# Patient Record
Sex: MALE | Race: WHITE | Employment: OTHER | URBAN - METROPOLITAN AREA
[De-identification: names, ages, dates, MRNs, and addresses within clinical notes are randomized per-mention and may not be internally consistent; named-entity substitution may affect disease eponyms.]

---

## 2017-01-23 ENCOUNTER — OFFICE VISIT (OUTPATIENT)
Dept: FAMILY MEDICINE CLINIC | Facility: CLINIC | Age: 72
End: 2017-01-23

## 2017-01-23 ENCOUNTER — LAB ENCOUNTER (OUTPATIENT)
Dept: LAB | Age: 72
End: 2017-01-23
Attending: INTERNAL MEDICINE
Payer: MEDICARE

## 2017-01-23 VITALS
DIASTOLIC BLOOD PRESSURE: 72 MMHG | SYSTOLIC BLOOD PRESSURE: 122 MMHG | WEIGHT: 196 LBS | RESPIRATION RATE: 16 BRPM | BODY MASS INDEX: 29.03 KG/M2 | HEIGHT: 69 IN | HEART RATE: 70 BPM

## 2017-01-23 DIAGNOSIS — E78.00 PURE HYPERCHOLESTEROLEMIA: ICD-10-CM

## 2017-01-23 DIAGNOSIS — E78.00 PURE HYPERCHOLESTEROLEMIA: Primary | ICD-10-CM

## 2017-01-23 DIAGNOSIS — D64.9 ANEMIA, UNSPECIFIED TYPE: ICD-10-CM

## 2017-01-23 LAB
ALBUMIN SERPL-MCNC: 4 G/DL (ref 3.5–4.8)
ALP LIVER SERPL-CCNC: 49 U/L (ref 45–117)
ALT SERPL-CCNC: 20 U/L (ref 17–63)
AST SERPL-CCNC: 23 U/L (ref 15–41)
BASOPHILS # BLD AUTO: 0.05 X10(3) UL (ref 0–0.1)
BASOPHILS NFR BLD AUTO: 0.8 %
BILIRUB SERPL-MCNC: 1.5 MG/DL (ref 0.1–2)
BUN BLD-MCNC: 11 MG/DL (ref 8–20)
CALCIUM BLD-MCNC: 8.8 MG/DL (ref 8.3–10.3)
CHLORIDE: 105 MMOL/L (ref 101–111)
CHOLEST SMN-MCNC: 136 MG/DL (ref ?–200)
CO2: 28 MMOL/L (ref 22–32)
CREAT BLD-MCNC: 1.16 MG/DL (ref 0.7–1.3)
EOSINOPHIL # BLD AUTO: 0.15 X10(3) UL (ref 0–0.3)
EOSINOPHIL NFR BLD AUTO: 2.4 %
ERYTHROCYTE [DISTWIDTH] IN BLOOD BY AUTOMATED COUNT: 15.1 % (ref 11.5–16)
GLUCOSE BLD-MCNC: 95 MG/DL (ref 70–99)
HCT VFR BLD AUTO: 45.5 % (ref 37–53)
HDLC SERPL-MCNC: 54 MG/DL (ref 45–?)
HDLC SERPL: 2.52 {RATIO} (ref ?–4.97)
HGB BLD-MCNC: 14.8 G/DL (ref 13–17)
IMMATURE GRANULOCYTE COUNT: 0.01 X10(3) UL (ref 0–1)
IMMATURE GRANULOCYTE RATIO %: 0.2 %
LDLC SERPL CALC-MCNC: 65 MG/DL (ref ?–130)
LYMPHOCYTES # BLD AUTO: 1.37 X10(3) UL (ref 0.9–4)
LYMPHOCYTES NFR BLD AUTO: 21.7 %
M PROTEIN MFR SERPL ELPH: 7.1 G/DL (ref 6.1–8.3)
MCH RBC QN AUTO: 31.3 PG (ref 27–33.2)
MCHC RBC AUTO-ENTMCNC: 32.5 G/DL (ref 31–37)
MCV RBC AUTO: 96.2 FL (ref 80–99)
MONOCYTES # BLD AUTO: 0.95 X10(3) UL (ref 0.1–0.6)
MONOCYTES NFR BLD AUTO: 15 %
NEUTROPHIL ABS PRELIM: 3.79 X10 (3) UL (ref 1.3–6.7)
NEUTROPHILS # BLD AUTO: 3.79 X10(3) UL (ref 1.3–6.7)
NEUTROPHILS NFR BLD AUTO: 59.9 %
NONHDLC SERPL-MCNC: 82 MG/DL (ref ?–130)
PLATELET # BLD AUTO: 197 10(3)UL (ref 150–450)
POTASSIUM SERPL-SCNC: 4.3 MMOL/L (ref 3.6–5.1)
PSA SERPL-MCNC: 1.32 NG/ML (ref 0.01–4)
RBC # BLD AUTO: 4.73 X10(6)UL (ref 3.8–5.8)
RED CELL DISTRIBUTION WIDTH-SD: 53.6 FL (ref 35.1–46.3)
SODIUM SERPL-SCNC: 139 MMOL/L (ref 136–144)
TRIGLYCERIDES: 87 MG/DL (ref ?–150)
TSI SER-ACNC: 0.88 MIU/ML (ref 0.35–5.5)
VLDL: 17 MG/DL (ref 5–40)
WBC # BLD AUTO: 6.3 X10(3) UL (ref 4–13)

## 2017-01-23 PROCEDURE — 99213 OFFICE O/P EST LOW 20 MIN: CPT | Performed by: INTERNAL MEDICINE

## 2017-01-23 PROCEDURE — 85025 COMPLETE CBC W/AUTO DIFF WBC: CPT

## 2017-01-23 PROCEDURE — 84443 ASSAY THYROID STIM HORMONE: CPT

## 2017-01-23 PROCEDURE — 80061 LIPID PANEL: CPT

## 2017-01-23 PROCEDURE — 80053 COMPREHEN METABOLIC PANEL: CPT

## 2017-01-23 PROCEDURE — 36415 COLL VENOUS BLD VENIPUNCTURE: CPT

## 2017-01-23 PROCEDURE — 84153 ASSAY OF PSA TOTAL: CPT

## 2017-01-23 NOTE — PROGRESS NOTES
CC: Patient presents with:  Medication Follow-Up       HPI:   High lipids, doing well on lipitor, no chest pain. Didn't see heme/onc.        Current Outpatient Prescriptions:  ATORVASTATIN CALCIUM 10 MG Oral Tab TAKE ONE TABLET BY MOUTH ONCE DAILY Dis failure) ([facility] [address]     Atrial fibrillation ([facility] [address]     Diverticulitis of colon (without mention of hemorrhage)     Other and unspecified hyperlipidemia     Dizziness and giddiness     Flank pain     Other follow-up examination(V67.59)     Acute conjunctivitis, b reason, didn't see heme/onc, will repeat cbc. The patient indicates understanding of these issues and agrees to the plan. Return in about 6 months (around 7/23/2017).

## 2017-06-23 ENCOUNTER — TELEPHONE (OUTPATIENT)
Dept: FAMILY MEDICINE CLINIC | Facility: CLINIC | Age: 72
End: 2017-06-23

## 2017-07-05 RX ORDER — ATORVASTATIN CALCIUM 10 MG/1
TABLET, FILM COATED ORAL
Qty: 90 TABLET | Refills: 1 | Status: SHIPPED | OUTPATIENT
Start: 2017-07-05 | End: 2017-12-28

## 2017-07-05 NOTE — TELEPHONE ENCOUNTER
Requesting Atorvastatin 10mg  LOV: 1/23/17  RTC: 6 months  Last Labs: 1/23/17  Filled: 12/27/16 #90 with 1 refills    Future Appointments  Date Time Provider Arturo Matthews   7/24/2017 1:00 PM Yaw Belcher MD EMG [address]   5/11/2018 8:40 AM Marisol Rg

## 2017-07-24 ENCOUNTER — OFFICE VISIT (OUTPATIENT)
Dept: FAMILY MEDICINE CLINIC | Facility: CLINIC | Age: 72
End: 2017-07-24

## 2017-07-24 VITALS
DIASTOLIC BLOOD PRESSURE: 76 MMHG | HEIGHT: 69 IN | SYSTOLIC BLOOD PRESSURE: 122 MMHG | BODY MASS INDEX: 27.99 KG/M2 | WEIGHT: 189 LBS | RESPIRATION RATE: 16 BRPM | HEART RATE: 78 BPM

## 2017-07-24 DIAGNOSIS — E78.00 PURE HYPERCHOLESTEROLEMIA: Primary | ICD-10-CM

## 2017-07-24 DIAGNOSIS — I48.20 CHRONIC ATRIAL FIBRILLATION (HCC): ICD-10-CM

## 2017-07-24 DIAGNOSIS — I50.22 CHRONIC SYSTOLIC CONGESTIVE HEART FAILURE (HCC): ICD-10-CM

## 2017-07-24 PROCEDURE — 99214 OFFICE O/P EST MOD 30 MIN: CPT | Performed by: INTERNAL MEDICINE

## 2017-07-24 NOTE — PROGRESS NOTES
CC: Patient presents with:  Medication Follow-Up       HPI:   1. Pure hypercholesterolemia  (primary encounter diagnosis), doing well on lipitor  2. Chronic systolic congestive heart failure ([address] doing well on lasix, no sob. F/u with cardiology.    3. Prediabetes 10/9/2014   • SOB (shortness of breath)    • Unspecified essential hypertension    • Visual impairment       Patient Active Problem List:     Pure hypercholesterolemia     CHF (congestive heart failure) (HCC)     Atrial fibrillation ([facility]     D heart failure (HCC)  Chronic atrial fibrillation ([facility]     PLAN:  1. Pure hypercholesterolemia  (primary encounter diagnosis), doing well on lipitor, will cont, will run labs.    2. Chronic systolic congestive heart failure ([facility] doing well on lasix, no so

## 2017-08-03 ENCOUNTER — LAB ENCOUNTER (OUTPATIENT)
Dept: LAB | Age: 72
End: 2017-08-03
Attending: INTERNAL MEDICINE
Payer: MEDICARE

## 2017-08-03 DIAGNOSIS — E78.00 PURE HYPERCHOLESTEROLEMIA: ICD-10-CM

## 2017-08-03 LAB
ALBUMIN SERPL-MCNC: 3.8 G/DL (ref 3.5–4.8)
ALP LIVER SERPL-CCNC: 54 U/L (ref 45–117)
ALT SERPL-CCNC: 23 U/L (ref 17–63)
AST SERPL-CCNC: 20 U/L (ref 15–41)
BILIRUB SERPL-MCNC: 1 MG/DL (ref 0.1–2)
BUN BLD-MCNC: 18 MG/DL (ref 8–20)
CALCIUM BLD-MCNC: 8.9 MG/DL (ref 8.3–10.3)
CHLORIDE: 107 MMOL/L (ref 101–111)
CHOLEST SMN-MCNC: 144 MG/DL (ref ?–200)
CO2: 25 MMOL/L (ref 22–32)
CREAT BLD-MCNC: 1.01 MG/DL (ref 0.7–1.3)
GLUCOSE BLD-MCNC: 106 MG/DL (ref 70–99)
HDLC SERPL-MCNC: 44 MG/DL (ref 45–?)
HDLC SERPL: 3.27 {RATIO} (ref ?–4.97)
LDLC SERPL CALC-MCNC: 84 MG/DL (ref ?–130)
LDLC SERPL-MCNC: 16 MG/DL (ref 5–40)
M PROTEIN MFR SERPL ELPH: 7 G/DL (ref 6.1–8.3)
NONHDLC SERPL-MCNC: 100 MG/DL (ref ?–130)
POTASSIUM SERPL-SCNC: 4.3 MMOL/L (ref 3.6–5.1)
SODIUM SERPL-SCNC: 139 MMOL/L (ref 136–144)
TRIGLYCERIDES: 80 MG/DL (ref ?–150)

## 2017-08-03 PROCEDURE — 36415 COLL VENOUS BLD VENIPUNCTURE: CPT

## 2017-08-03 PROCEDURE — 80053 COMPREHEN METABOLIC PANEL: CPT

## 2017-08-03 PROCEDURE — 80061 LIPID PANEL: CPT

## 2017-12-29 RX ORDER — ATORVASTATIN CALCIUM 10 MG/1
TABLET, FILM COATED ORAL
Qty: 90 TABLET | Refills: 0 | Status: SHIPPED | OUTPATIENT
Start: 2017-12-29 | End: 2018-01-30

## 2017-12-29 NOTE — TELEPHONE ENCOUNTER
Requesting Atorvastatin  LOV: 7/24/17  RTC: 6 months  Last Relevant Labs: 8/3/17  Filled: 7/5/17 #90 with 1 refills    Future Appointments  Date Time Provider Arturo Matthews   1/24/2018 8:00 AM Ruby Lancaster MD EMG [address]   5/11/2018 8:4

## 2018-01-24 ENCOUNTER — APPOINTMENT (OUTPATIENT)
Dept: LAB | Age: 73
End: 2018-01-24
Attending: FAMILY MEDICINE
Payer: MEDICARE

## 2018-01-30 ENCOUNTER — LAB ENCOUNTER (OUTPATIENT)
Dept: LAB | Age: 73
End: 2018-01-30
Attending: FAMILY MEDICINE
Payer: MEDICARE

## 2018-01-30 ENCOUNTER — OFFICE VISIT (OUTPATIENT)
Dept: FAMILY MEDICINE CLINIC | Facility: CLINIC | Age: 73
End: 2018-01-30

## 2018-01-30 VITALS
BODY MASS INDEX: 27.55 KG/M2 | RESPIRATION RATE: 16 BRPM | SYSTOLIC BLOOD PRESSURE: 116 MMHG | DIASTOLIC BLOOD PRESSURE: 66 MMHG | HEART RATE: 62 BPM | WEIGHT: 186 LBS | HEIGHT: 69 IN | TEMPERATURE: 99 F

## 2018-01-30 DIAGNOSIS — E78.00 PURE HYPERCHOLESTEROLEMIA: ICD-10-CM

## 2018-01-30 DIAGNOSIS — I50.22 CHRONIC SYSTOLIC CONGESTIVE HEART FAILURE (HCC): ICD-10-CM

## 2018-01-30 DIAGNOSIS — I48.20 CHRONIC ATRIAL FIBRILLATION (HCC): Primary | ICD-10-CM

## 2018-01-30 LAB
ALBUMIN SERPL-MCNC: 3.9 G/DL (ref 3.5–4.8)
ALP LIVER SERPL-CCNC: 46 U/L (ref 45–117)
ALT SERPL-CCNC: 22 U/L (ref 17–63)
AST SERPL-CCNC: 21 U/L (ref 15–41)
BASOPHILS # BLD AUTO: 0.03 X10(3) UL (ref 0–0.1)
BASOPHILS NFR BLD AUTO: 0.5 %
BILIRUB SERPL-MCNC: 1.1 MG/DL (ref 0.1–2)
BUN BLD-MCNC: 16 MG/DL (ref 8–20)
CALCIUM BLD-MCNC: 8.9 MG/DL (ref 8.3–10.3)
CHLORIDE: 106 MMOL/L (ref 101–111)
CHOLEST SMN-MCNC: 139 MG/DL (ref ?–200)
CO2: 30 MMOL/L (ref 22–32)
CREAT BLD-MCNC: 1.12 MG/DL (ref 0.7–1.3)
EOSINOPHIL # BLD AUTO: 0.15 X10(3) UL (ref 0–0.3)
EOSINOPHIL NFR BLD AUTO: 2.6 %
ERYTHROCYTE [DISTWIDTH] IN BLOOD BY AUTOMATED COUNT: 13.5 % (ref 11.5–16)
GLUCOSE BLD-MCNC: 101 MG/DL (ref 70–99)
HCT VFR BLD AUTO: 46.5 % (ref 37–53)
HDLC SERPL-MCNC: 43 MG/DL (ref 45–?)
HDLC SERPL: 3.23 {RATIO} (ref ?–4.97)
HGB BLD-MCNC: 15.4 G/DL (ref 13–17)
IMMATURE GRANULOCYTE COUNT: 0.02 X10(3) UL (ref 0–1)
IMMATURE GRANULOCYTE RATIO %: 0.3 %
LDLC SERPL CALC-MCNC: 83 MG/DL (ref ?–130)
LYMPHOCYTES # BLD AUTO: 1.44 X10(3) UL (ref 0.9–4)
LYMPHOCYTES NFR BLD AUTO: 25 %
M PROTEIN MFR SERPL ELPH: 7.3 G/DL (ref 6.1–8.3)
MCH RBC QN AUTO: 30.9 PG (ref 27–33.2)
MCHC RBC AUTO-ENTMCNC: 33.1 G/DL (ref 31–37)
MCV RBC AUTO: 93.2 FL (ref 80–99)
MONOCYTES # BLD AUTO: 0.72 X10(3) UL (ref 0.1–0.6)
MONOCYTES NFR BLD AUTO: 12.5 %
NEUTROPHIL ABS PRELIM: 3.4 X10 (3) UL (ref 1.3–6.7)
NEUTROPHILS # BLD AUTO: 3.4 X10(3) UL (ref 1.3–6.7)
NEUTROPHILS NFR BLD AUTO: 59.1 %
NONHDLC SERPL-MCNC: 96 MG/DL (ref ?–130)
PLATELET # BLD AUTO: 164 10(3)UL (ref 150–450)
POTASSIUM SERPL-SCNC: 4.4 MMOL/L (ref 3.6–5.1)
RBC # BLD AUTO: 4.99 X10(6)UL (ref 3.8–5.8)
RED CELL DISTRIBUTION WIDTH-SD: 45.6 FL (ref 35.1–46.3)
SODIUM SERPL-SCNC: 140 MMOL/L (ref 136–144)
TRIGL SERPL-MCNC: 63 MG/DL (ref ?–150)
VLDLC SERPL CALC-MCNC: 13 MG/DL (ref 5–40)
WBC # BLD AUTO: 5.8 X10(3) UL (ref 4–13)

## 2018-01-30 PROCEDURE — 80053 COMPREHEN METABOLIC PANEL: CPT

## 2018-01-30 PROCEDURE — 85025 COMPLETE CBC W/AUTO DIFF WBC: CPT

## 2018-01-30 PROCEDURE — 80061 LIPID PANEL: CPT

## 2018-01-30 PROCEDURE — 36415 COLL VENOUS BLD VENIPUNCTURE: CPT

## 2018-01-30 PROCEDURE — 99203 OFFICE O/P NEW LOW 30 MIN: CPT | Performed by: FAMILY MEDICINE

## 2018-01-30 RX ORDER — ATORVASTATIN CALCIUM 10 MG/1
TABLET, FILM COATED ORAL
Qty: 90 TABLET | Refills: 1 | Status: SHIPPED | OUTPATIENT
Start: 2018-01-30 | End: 2018-05-11

## 2018-01-30 RX ORDER — METOPROLOL TARTRATE 50 MG/1
50 TABLET, FILM COATED ORAL 2 TIMES DAILY
Qty: 180 TABLET | Refills: 1 | Status: CANCELLED | OUTPATIENT
Start: 2018-01-30

## 2018-01-30 RX ORDER — DILTIAZEM HYDROCHLORIDE 240 MG/1
CAPSULE, COATED, EXTENDED RELEASE ORAL
Qty: 90 CAPSULE | Refills: 1 | Status: CANCELLED | OUTPATIENT
Start: 2018-01-30

## 2018-01-30 RX ORDER — FUROSEMIDE 20 MG/1
TABLET ORAL
Qty: 90 TABLET | Refills: 1 | Status: CANCELLED | OUTPATIENT
Start: 2018-01-30

## 2018-01-30 NOTE — PROGRESS NOTES
HPI:    Patient ID: Dimitry Galicia is a 74year old male. HPI  Mr. Antonella Warner is a pleasant 75 y/o M with history of A. fib on anticoagulation with warfarin status post failed cardioversions twice, hypertension, hyperlipidemia or today to establish care wit Oral Tab TAKE ONE TABLET BY MOUTH ONCE DAILY Disp: 90 tablet Rfl: 3     Allergies:No Known Allergies   PHYSICAL EXAM:   Physical Exam   Constitutional: No distress. HENT:   Mouth/Throat: Oropharynx is clear and moist. No oropharyngeal exudate.    Eyes: Co

## 2018-01-30 NOTE — PATIENT INSTRUCTIONS
Thank you for choosing Holly Lancaster MD at [place] [address]  To Do: Marquis Dies  1. Please take meds as directed  Effective 6/19/17 until November 2017  Due to [employer] is being moved.   It is inside the Health system is your duty and for your safety to discuss with the pharmacist and our office with questions, and to notify us and stop treatment if problems arise, but know that our intention is that the benefits outweigh those potential risks and we strive to make you

## 2018-02-01 ENCOUNTER — TELEPHONE (OUTPATIENT)
Dept: FAMILY MEDICINE CLINIC | Facility: CLINIC | Age: 73
End: 2018-02-01

## 2018-02-01 DIAGNOSIS — Z98.890 HISTORY OF DETACHED RETINA REPAIR: Primary | ICD-10-CM

## 2018-02-01 DIAGNOSIS — Z86.69 HISTORY OF DETACHED RETINA REPAIR: Primary | ICD-10-CM

## 2018-02-01 NOTE — TELEPHONE ENCOUNTER
Alexia Cook [facility] Clinical Staff   Cc: P [facility] Referral Pool   Phone Number: [phone]             .Reason for the order/referral: Dr. Mikaela Dia   PCP: [email or web address] Dr. Asha Cody   Refer to Provider (first and last name): Dr. Ninfa Rios   Specialty:o

## 2018-02-19 ENCOUNTER — TELEPHONE (OUTPATIENT)
Dept: FAMILY MEDICINE CLINIC | Facility: CLINIC | Age: 73
End: 2018-02-19

## 2018-02-19 DIAGNOSIS — Z51.81 MONITORING FOR LONG-TERM ANTICOAGULANT USE: ICD-10-CM

## 2018-02-19 DIAGNOSIS — I48.20 CHRONIC ATRIAL FIBRILLATION (HCC): ICD-10-CM

## 2018-02-19 DIAGNOSIS — I48.20 CHRONIC ATRIAL FIBRILLATION (HCC): Primary | ICD-10-CM

## 2018-02-19 DIAGNOSIS — Z79.01 MONITORING FOR LONG-TERM ANTICOAGULANT USE: ICD-10-CM

## 2018-02-19 RX ORDER — DILTIAZEM HYDROCHLORIDE 240 MG/1
CAPSULE, COATED, EXTENDED RELEASE ORAL
Qty: 90 CAPSULE | Refills: 1 | Status: SHIPPED | OUTPATIENT
Start: 2018-02-19 | End: 2018-03-07

## 2018-02-19 NOTE — TELEPHONE ENCOUNTER
L/M for patient to call back regarding preferred pharmacy. Received fax from [facility], but pt last filled at [facility].

## 2018-02-19 NOTE — TELEPHONE ENCOUNTER
Requesting cartia xt  LOV: 1/30/18  RTC: 6 months  Last Relevant Labs: 1/30/18  Filled: 5/13/17 #90 with 3 refills    Future Appointments  Date Time Provider Arturo Matthews   5/11/2018 8:40 AM MD GIBSON Benjamin   7/30/2018 8:30 AM

## 2018-02-19 NOTE — TELEPHONE ENCOUNTER
Jeniffer Santana  P Emg 20 Clinical Staff   Cc: Aiden Conception Central Referral Pool   Phone Number: [phone]             Patient Name: Marquis Benitez   : 41   Reason for the order/referral: coumadin strips   PCP: Aubrie Sultana   Refer to Provider

## 2018-02-19 NOTE — TELEPHONE ENCOUNTER
Refill request from [facility] received from fax:    Requesting:  Metoprolol Tartrate  Furosemide  Warfarin    L/M for pt requesting which pharmacy is preferred.

## 2018-02-19 NOTE — TELEPHONE ENCOUNTER
Requesting referral for [facility], [facility] will fax order.    PT has been seeing Dr Mónica Kim,   LOV: 01/30/18  RTC: 6 months  PT states that he contacted [facility] and informed them that he needs more strips to test his INR, [facility] informed him they will

## 2018-02-20 RX ORDER — FUROSEMIDE 20 MG/1
TABLET ORAL
Qty: 90 TABLET | Refills: 1 | Status: SHIPPED | OUTPATIENT
Start: 2018-02-20 | End: 2018-05-11

## 2018-02-20 RX ORDER — WARFARIN SODIUM 2.5 MG/1
TABLET ORAL
Qty: 30 TABLET | Refills: 3 | OUTPATIENT
Start: 2018-02-20

## 2018-02-20 RX ORDER — METOPROLOL TARTRATE 50 MG/1
50 TABLET, FILM COATED ORAL 2 TIMES DAILY
Qty: 180 TABLET | Refills: 1 | Status: SHIPPED | OUTPATIENT
Start: 2018-02-20 | End: 2018-05-11

## 2018-02-20 NOTE — TELEPHONE ENCOUNTER
Refill request was received from [facility]. This is the requested/preferred pharmacy. No further confirmation would be needed . Requesting Metoprolol.  Furosemide and Warfarin   LOV: 1/30/18  RTC: 6 months   Last Relevant Labs: pp for BP meds   Filled: Oz

## 2018-02-28 NOTE — TELEPHONE ENCOUNTER
Fax received from [address] [place] [address] asking for referral to be entered for testing supplies we did order. Referral placed with all information given. Saved in brown triage folder.

## 2018-03-06 ENCOUNTER — TELEPHONE (OUTPATIENT)
Dept: FAMILY MEDICINE CLINIC | Facility: CLINIC | Age: 73
End: 2018-03-06

## 2018-03-06 DIAGNOSIS — I48.20 CHRONIC ATRIAL FIBRILLATION (HCC): ICD-10-CM

## 2018-03-06 DIAGNOSIS — Z51.81 MONITORING FOR LONG-TERM ANTICOAGULANT USE: ICD-10-CM

## 2018-03-06 DIAGNOSIS — Z79.01 MONITORING FOR LONG-TERM ANTICOAGULANT USE: ICD-10-CM

## 2018-03-06 RX ORDER — WARFARIN SODIUM 2.5 MG/1
TABLET ORAL
Qty: 30 TABLET | Refills: 3 | Status: SHIPPED | OUTPATIENT
Start: 2018-03-06 | End: 2019-08-05

## 2018-03-06 RX ORDER — WARFARIN SODIUM 5 MG/1
TABLET ORAL
Qty: 90 TABLET | Refills: 3 | Status: SHIPPED | OUTPATIENT
Start: 2018-03-06 | End: 2019-08-05

## 2018-03-06 NOTE — TELEPHONE ENCOUNTER
Requesting Cartia XT  LOV: 1/30/18  RTC: 6 months  Last Relevant Labs: 1/30/18  Filled: 2/19/18 #90 with 1 refills sent to local     Future Appointments  Date Time Provider Arturo Matthews   5/11/2018 8:40 AM MD GIBSON Woodall

## 2018-03-06 NOTE — TELEPHONE ENCOUNTER
Spoke to Jeffery Torres at [facility] and states they have not received the referral. Jeffery Torres provided new fax number [phone].  Fax sent, confirmation received

## 2018-03-06 NOTE — TELEPHONE ENCOUNTER
Pt is requesting refills for   Warfarin Sodium 2.5 MG Oral Tab 30 tablet 3 3/20/2017    Sig : Mamadou Abby one tablet by mouth two nights per week OR AS DIRECTED BY COUMADIN CLINIC     Route:   (none)       Warfarin Sodium 5 MG Oral Tab 90 tablet 3 1/9/2017     S

## 2018-03-06 NOTE — TELEPHONE ENCOUNTER
Requesting Warfarin 2.5mg and 5mg   LOV: 1/30/18  RTC: 6 mos  Last Labs: 2/16/18   Ref Range & Units 2/16/18   INR 2 - 3 2.2      Filled: Warfarin 2.5mg 03/20/17 #30with 3 refills  Warfarin 5 mg 01/09/17 #90 with 3 refills    Future Appointments  Date Time

## 2018-03-06 NOTE — TELEPHONE ENCOUNTER
Received refill request from [address] [place], [place] [address] [employer] for quantity of 90. Reference number [id number].

## 2018-03-07 RX ORDER — DILTIAZEM HYDROCHLORIDE 240 MG/1
CAPSULE, COATED, EXTENDED RELEASE ORAL
Qty: 90 CAPSULE | Refills: 1 | Status: SHIPPED | OUTPATIENT
Start: 2018-03-07 | End: 2018-09-25

## 2018-03-07 NOTE — TELEPHONE ENCOUNTER
Patient does need his cartia sent to mail order. He is also asking about his testing strips for PT/INR. A Referral was placed on 2/28/18 and it is approved. Will fax to [facility] as well.   Rx sent to [facility] and DME printed with approval from referral and fax

## 2018-03-14 ENCOUNTER — TELEPHONE (OUTPATIENT)
Dept: FAMILY MEDICINE CLINIC | Facility: CLINIC | Age: 73
End: 2018-03-14

## 2018-03-14 DIAGNOSIS — Z12.83 SKIN EXAM, SCREENING FOR CANCER: Primary | ICD-10-CM

## 2018-03-14 NOTE — TELEPHONE ENCOUNTER
Reason for the order/referral:Yearly Skin Check   PCP: Cesar Cristina   Refer to Provider: Hernán Eid   Specialty:Dermatology   Patient Insurance: Payor: Arnol Contreras / Plan: [facility] HMO / Product Type: HMO /   Has the patient been

## 2018-05-14 ENCOUNTER — TELEPHONE (OUTPATIENT)
Dept: FAMILY MEDICINE CLINIC | Facility: CLINIC | Age: 73
End: 2018-05-14

## 2018-05-14 DIAGNOSIS — Z12.83 SCREENING FOR MALIGNANT NEOPLASM OF SKIN: Primary | ICD-10-CM

## 2018-05-14 NOTE — TELEPHONE ENCOUNTER
Received a call from Aman Moore at Dr Sahara Grimes office stating we had written a referral for patient to see Dr Justina Mccormack.  Dr Sophia Cortez is NOT part of a HMO, she than referred him to Dr Trudi Cabrera and told us he now needs a referral entered as external so ref

## 2018-05-15 ENCOUNTER — TELEPHONE (OUTPATIENT)
Dept: FAMILY MEDICINE CLINIC | Facility: CLINIC | Age: 73
End: 2018-05-15

## 2018-05-15 NOTE — TELEPHONE ENCOUNTER
Roberto Alonzo CNA  P Em[facility] 20 Clinical Staff   Cc: [facility] Referral Pool   Phone Number: [phone]             .Reason for the order/referral:Referral   PCP: Dr. Jaci Romeo   Refer to Provider Dr. Maxi Emanuel   Specialty:Dermatology   Margarita

## 2018-07-27 ENCOUNTER — MA CHART PREP (OUTPATIENT)
Dept: FAMILY MEDICINE CLINIC | Facility: CLINIC | Age: 73
End: 2018-07-27

## 2018-07-27 PROBLEM — I70.0 AORTIC ATHEROSCLEROSIS: Status: ACTIVE | Noted: 2018-07-27

## 2018-07-27 PROBLEM — I35.8 AORTIC VALVE SCLEROSIS: Status: ACTIVE | Noted: 2018-07-27

## 2018-07-27 PROBLEM — I70.0 AORTIC ATHEROSCLEROSIS (HCC): Status: ACTIVE | Noted: 2018-07-27

## 2018-07-30 ENCOUNTER — OFFICE VISIT (OUTPATIENT)
Dept: FAMILY MEDICINE CLINIC | Facility: CLINIC | Age: 73
End: 2018-07-30
Payer: COMMERCIAL

## 2018-07-30 ENCOUNTER — LAB ENCOUNTER (OUTPATIENT)
Dept: LAB | Age: 73
End: 2018-07-30
Attending: FAMILY MEDICINE
Payer: MEDICARE

## 2018-07-30 VITALS
SYSTOLIC BLOOD PRESSURE: 116 MMHG | RESPIRATION RATE: 16 BRPM | DIASTOLIC BLOOD PRESSURE: 82 MMHG | HEIGHT: 66.5 IN | TEMPERATURE: 98 F | HEART RATE: 64 BPM | WEIGHT: 189 LBS | BODY MASS INDEX: 30.02 KG/M2

## 2018-07-30 DIAGNOSIS — I70.0 AORTIC ATHEROSCLEROSIS (HCC): ICD-10-CM

## 2018-07-30 DIAGNOSIS — Z00.00 ENCOUNTER FOR ANNUAL HEALTH EXAMINATION: ICD-10-CM

## 2018-07-30 DIAGNOSIS — I35.8 AORTIC VALVE SCLEROSIS: ICD-10-CM

## 2018-07-30 DIAGNOSIS — R73.03 PREDIABETES: ICD-10-CM

## 2018-07-30 DIAGNOSIS — I50.22 CHRONIC SYSTOLIC CONGESTIVE HEART FAILURE (HCC): ICD-10-CM

## 2018-07-30 DIAGNOSIS — Z00.00 ENCOUNTER FOR MEDICARE ANNUAL WELLNESS EXAM: Primary | ICD-10-CM

## 2018-07-30 DIAGNOSIS — Z13.6 SCREENING FOR AAA (ABDOMINAL AORTIC ANEURYSM): ICD-10-CM

## 2018-07-30 DIAGNOSIS — Z79.01 LONG TERM (CURRENT) USE OF ANTICOAGULANTS: ICD-10-CM

## 2018-07-30 DIAGNOSIS — D64.9 ANEMIA, UNSPECIFIED TYPE: ICD-10-CM

## 2018-07-30 DIAGNOSIS — I48.20 CHRONIC ATRIAL FIBRILLATION (HCC): ICD-10-CM

## 2018-07-30 DIAGNOSIS — J44.9 CHRONIC OBSTRUCTIVE PULMONARY DISEASE, UNSPECIFIED COPD TYPE (HCC): ICD-10-CM

## 2018-07-30 DIAGNOSIS — I34.0 NON-RHEUMATIC MITRAL REGURGITATION: ICD-10-CM

## 2018-07-30 LAB
ALBUMIN SERPL-MCNC: 3.8 G/DL (ref 3.5–4.8)
ALBUMIN/GLOB SERPL: 1.1 {RATIO} (ref 1–2)
ALP LIVER SERPL-CCNC: 45 U/L (ref 45–117)
ALT SERPL-CCNC: 22 U/L (ref 17–63)
ANION GAP SERPL CALC-SCNC: 6 MMOL/L (ref 0–18)
AST SERPL-CCNC: 23 U/L (ref 15–41)
BASOPHILS # BLD AUTO: 0.03 X10(3) UL (ref 0–0.1)
BASOPHILS NFR BLD AUTO: 0.5 %
BILIRUB SERPL-MCNC: 1.2 MG/DL (ref 0.1–2)
BUN BLD-MCNC: 12 MG/DL (ref 8–20)
BUN/CREAT SERPL: 10.6 (ref 10–20)
CALCIUM BLD-MCNC: 9.1 MG/DL (ref 8.3–10.3)
CHLORIDE SERPL-SCNC: 106 MMOL/L (ref 101–111)
CHOLEST SMN-MCNC: 127 MG/DL (ref ?–200)
CO2 SERPL-SCNC: 28 MMOL/L (ref 22–32)
CREAT BLD-MCNC: 1.13 MG/DL (ref 0.7–1.3)
EOSINOPHIL # BLD AUTO: 0.13 X10(3) UL (ref 0–0.3)
EOSINOPHIL NFR BLD AUTO: 2.1 %
ERYTHROCYTE [DISTWIDTH] IN BLOOD BY AUTOMATED COUNT: 13.8 % (ref 11.5–16)
EST. AVERAGE GLUCOSE BLD GHB EST-MCNC: 120 MG/DL (ref 68–126)
GLOBULIN PLAS-MCNC: 3.4 G/DL (ref 2.5–3.7)
GLUCOSE BLD-MCNC: 100 MG/DL (ref 70–99)
HBA1C MFR BLD HPLC: 5.8 % (ref ?–5.7)
HCT VFR BLD AUTO: 45.5 % (ref 37–53)
HDLC SERPL-MCNC: 44 MG/DL (ref 40–59)
HGB BLD-MCNC: 15.1 G/DL (ref 13–17)
IMMATURE GRANULOCYTE COUNT: 0.02 X10(3) UL (ref 0–1)
IMMATURE GRANULOCYTE RATIO %: 0.3 %
LDLC SERPL CALC-MCNC: 69 MG/DL (ref ?–100)
LYMPHOCYTES # BLD AUTO: 1.32 X10(3) UL (ref 0.9–4)
LYMPHOCYTES NFR BLD AUTO: 21 %
M PROTEIN MFR SERPL ELPH: 7.2 G/DL (ref 6.1–8.3)
MCH RBC QN AUTO: 30.8 PG (ref 27–33.2)
MCHC RBC AUTO-ENTMCNC: 33.2 G/DL (ref 31–37)
MCV RBC AUTO: 92.9 FL (ref 80–99)
MONOCYTES # BLD AUTO: 0.78 X10(3) UL (ref 0.1–1)
MONOCYTES NFR BLD AUTO: 12.4 %
NEUTROPHIL ABS PRELIM: 4 X10 (3) UL (ref 1.3–6.7)
NEUTROPHILS # BLD AUTO: 4 X10(3) UL (ref 1.3–6.7)
NEUTROPHILS NFR BLD AUTO: 63.7 %
NONHDLC SERPL-MCNC: 83 MG/DL (ref ?–130)
OSMOLALITY SERPL CALC.SUM OF ELEC: 290 MOSM/KG (ref 275–295)
PLATELET # BLD AUTO: 177 10(3)UL (ref 150–450)
POTASSIUM SERPL-SCNC: 4.5 MMOL/L (ref 3.6–5.1)
RBC # BLD AUTO: 4.9 X10(6)UL (ref 3.8–5.8)
RED CELL DISTRIBUTION WIDTH-SD: 46.7 FL (ref 35.1–46.3)
SODIUM SERPL-SCNC: 140 MMOL/L (ref 136–144)
TRIGL SERPL-MCNC: 68 MG/DL (ref 30–149)
VLDLC SERPL CALC-MCNC: 14 MG/DL (ref 0–30)
WBC # BLD AUTO: 6.3 X10(3) UL (ref 4–13)

## 2018-07-30 PROCEDURE — 85025 COMPLETE CBC W/AUTO DIFF WBC: CPT

## 2018-07-30 PROCEDURE — 80061 LIPID PANEL: CPT

## 2018-07-30 PROCEDURE — 83036 HEMOGLOBIN GLYCOSYLATED A1C: CPT

## 2018-07-30 PROCEDURE — G0439 PPPS, SUBSEQ VISIT: HCPCS | Performed by: FAMILY MEDICINE

## 2018-07-30 PROCEDURE — 80053 COMPREHEN METABOLIC PANEL: CPT

## 2018-07-30 PROCEDURE — 36415 COLL VENOUS BLD VENIPUNCTURE: CPT

## 2018-07-30 NOTE — PROGRESS NOTES
HPI:   Oralia Diaz is a 74year old male who presents for a MA (Medicare Advantage) [facility] (Once per calendar year).     Mr. Tory Chery is a pleasant 77-year-old gentleman with history of chronic systolic congestive heart failure, atrial fibrillation, ch Jc Alejandro (CHIROPRACTOR)  Kirt Shah MD as Consulting Physician (GASTROENTEROLOGY)    Patient Active Problem List:     Chronic systolic congestive heart failure ([facility]     Other and unspecified hyperlipidemia     Mitral regurgitation Cholecalciferol (VITAMIN D) 1000 UNITS Oral Cap Take 1 Cap by mouth daily. MEDICAL INFORMATION:   He  has a past medical history of Anemia (1/23/2017); Arrhythmia; Atrial fibrillation ([facility] Cancer [facility]); CHF (congestive heart failure) ([facility]  Pamela Hastings 30.05 kg/m² as calculated from the following:    Height as of this encounter: 66.5\". Weight as of this encounter: 189 lb.     Medicare Hearing Assessment  (Required for AWV/SWV)    Whispered Voice          Visual Acuity                           General Medicare annual wellness exam    Chronic systolic congestive heart failure ([facility]  -Stable and asymptomatic;continue with current medications    Non-rheumatic mitral regurgitation   -Stable and asymptomatic;continue with current medications     Long term (c SERVICES  INDICATIONS AND SCHEDULE Internal Lab or Procedure External Lab or Procedure   Diabetes Screening      HbgA1C   Annually No results found for: A1C    No flowsheet data found.     Fasting Blood Sugar (FSB)Annually   Glucose (mg/dL)   Date Value   0 abusers     Tetanus Toxoid  Only covered with a cut with metal- TD and TDaP Not covered by Medicare Part B) No vaccine history found This may be covered with your prescription benefits, but Medicare does not cover unless Medically needed    Zoster   Not co

## 2018-07-30 NOTE — PATIENT INSTRUCTIONS
Thank you for choosing Luly Bunch MD at [place] [address]  To Do: Darius Bentley  1. Please see age appropriate health prevention below    [employer] is located in [address] Monday, Tuesday & Friday – 8 a.m. to 4 p.m.   Wednesday, Thurs the benefits outweigh those potential risks and we strive to make you healthier and to improve your quality of life.     Referrals, and Radiology Information:    If your insurance requires a referral to a specialist, please allow 5 business days to process years, or colonoscopy every 10 years, or double-contrast barium enema every 5 years; yearly fecal occult blood test or fecal immunochemical test; or a stool DNA test as often as your healthcare provider advises; talk with your healthcare provider about [facility] risk for infection – talk with your healthcare provider 2 doses given at least 6 months apart   Hepatitis B Men at increased risk for infection – talk with your healthcare provider 3 doses over 6 months; second dose should be given 1 month after the first by your physician but may not be covered, or covered at this frequency, by your insurer. Please check with your insurance carrier before scheduling to verify coverage.     PREVENTATIVE SERVICES  INDICATIONS AND SCHEDULE Internal Lab or Procedure External La smoked more than 100 cigarettes in their lifetime   • Anyone with a family history    Colorectal Cancer Screening Covered up to Age 76     Colonoscopy Screen   Covered every 10 years- more often if abnormal There are no preventive care reminders to display injectable drug abusers     Tetanus Toxoid- Only covered with a cut with metal- TD and TDaP Not covered by Medicare Part B) No orders found for this or any previous visit.  This may be covered with your prescription benefits, but Medicare does not cover unl

## 2018-08-07 ENCOUNTER — TELEPHONE (OUTPATIENT)
Dept: FAMILY MEDICINE CLINIC | Facility: CLINIC | Age: 73
End: 2018-08-07

## 2018-08-07 NOTE — TELEPHONE ENCOUNTER
New RX faxed from [employer] for Metoprolol Tartrate 50 mg, take 1 tablet 2 times daily, #180,   Patient has indicated al allergy to Beta Adrenergic Blockers.

## 2018-08-07 NOTE — TELEPHONE ENCOUNTER
Patient seen 7/30/18 by our MD    No allergies listed on file and patient has been on Metoprolol for years - I spoke with wife to verify. Also this medication is prescribed by Cardiologist Dr. Crista España = they need to send to Dr. Crista España.  Pharmacy will send

## 2018-09-15 ENCOUNTER — HOSPITAL ENCOUNTER (OUTPATIENT)
Age: 73
Discharge: HOME | End: 2018-09-15
Attending: FAMILY MEDICINE
Payer: MEDICARE

## 2018-09-15 VITALS
TEMPERATURE: 98 F | DIASTOLIC BLOOD PRESSURE: 81 MMHG | HEART RATE: 76 BPM | HEIGHT: 67 IN | SYSTOLIC BLOOD PRESSURE: 121 MMHG | BODY MASS INDEX: 29.82 KG/M2 | RESPIRATION RATE: 20 BRPM | OXYGEN SATURATION: 97 % | WEIGHT: 190 LBS

## 2018-09-15 DIAGNOSIS — Z91.038 ALLERGIC REACTION TO INSECT BITE: Primary | ICD-10-CM

## 2018-09-15 DIAGNOSIS — Z91.038 ALLERGY TO ANT BITE: ICD-10-CM

## 2018-09-15 PROCEDURE — 99213 OFFICE O/P EST LOW 20 MIN: CPT

## 2018-09-15 PROCEDURE — 99204 OFFICE O/P NEW MOD 45 MIN: CPT

## 2018-09-15 NOTE — ED PROVIDER NOTES
Patient Seen in: [facility]    History   Patient presents with:  Rash    Stated Complaint: INSECT BITES LEFT ARM    HPI  This is a 26-year-old male coming in with complains of 2 days of a rash, extensive on the left arm, with si Surgeon: Justyna Goetz MD;  Location: [facility] ENDOSCOPY    Family history reviewed and is not pertinent to presenting problem.     Social History    Tobacco Use      Smoking status: Former Smoker        Packs/day: 1.00        Years: 20.00        Pack ye MDM       Avoid hot showers / hot baths   Rx Triamcinolone application over the affected areas   Cold packs and Aloe to help with itching and keeping the area moist   OTC Benadryl 25 mg every 6 hours to help with the itching - may make you sleepy a

## 2018-09-25 DIAGNOSIS — I48.20 CHRONIC ATRIAL FIBRILLATION (HCC): ICD-10-CM

## 2018-09-26 RX ORDER — ATORVASTATIN CALCIUM 10 MG/1
TABLET, FILM COATED ORAL
Qty: 90 TABLET | Refills: 1 | Status: SHIPPED | OUTPATIENT
Start: 2018-09-26 | End: 2019-01-21

## 2018-09-26 RX ORDER — FUROSEMIDE 20 MG/1
TABLET ORAL
Qty: 90 TABLET | Refills: 1 | Status: SHIPPED | OUTPATIENT
Start: 2018-09-26 | End: 2019-01-21

## 2018-09-26 RX ORDER — DILTIAZEM HYDROCHLORIDE 240 MG/1
CAPSULE, COATED, EXTENDED RELEASE ORAL
Qty: 90 CAPSULE | Refills: 1 | Status: SHIPPED | OUTPATIENT
Start: 2018-09-26 | End: 2019-02-27

## 2018-09-26 NOTE — TELEPHONE ENCOUNTER
Requesting Furosemide, Atorvastatin and Diltiazem   LOV: 7/30/18  RTC: 6 months   Last Relevant Labs: pp  Filled: Furosemide and Atorvastatin 5/11/18 #90 with 3 and Diltiazem 3/7/18 #90 with 1 refills    Future Appointments   Date Time Provider Department

## 2018-10-01 ENCOUNTER — MED REC SCAN ONLY (OUTPATIENT)
Dept: FAMILY MEDICINE CLINIC | Facility: CLINIC | Age: 73
End: 2018-10-01

## 2018-10-30 ENCOUNTER — TELEPHONE (OUTPATIENT)
Dept: FAMILY MEDICINE CLINIC | Facility: CLINIC | Age: 73
End: 2018-10-30

## 2018-10-30 DIAGNOSIS — Z86.69 HISTORY OF DETACHED RETINA REPAIR: Primary | ICD-10-CM

## 2018-10-30 DIAGNOSIS — Z98.890 HISTORY OF DETACHED RETINA REPAIR: Primary | ICD-10-CM

## 2018-10-30 DIAGNOSIS — Z01.00 ENCOUNTER FOR COMPLETE EYE EXAM: ICD-10-CM

## 2018-10-30 NOTE — TELEPHONE ENCOUNTER
I called Dr. Danny Barahona office for notes since we have none - she will fax now.     Requesting Ophthalmology Referral  LOV: 7/30/18  RTC: 6 months      Future Appointments   Date Time Provider Arturo Matthews   1/21/2019  8:30 AM Kimberly Estes MD EMG 20 E

## 2018-10-30 NOTE — TELEPHONE ENCOUNTER
Patient came into the office requesting a referral for an ophthalmologist, Dr. Pia Peres at [facility] in [place], he has an appt on 11/21/18. Please advise when it's in the system.

## 2019-01-17 ENCOUNTER — TELEPHONE (OUTPATIENT)
Dept: FAMILY MEDICINE CLINIC | Facility: CLINIC | Age: 74
End: 2019-01-17

## 2019-01-17 DIAGNOSIS — Z12.5 SCREENING FOR PROSTATE CANCER: Primary | ICD-10-CM

## 2019-01-17 DIAGNOSIS — R73.03 PREDIABETES: ICD-10-CM

## 2019-01-17 DIAGNOSIS — Z00.00 ROUTINE GENERAL MEDICAL EXAMINATION AT HEALTH CARE FACILITY: ICD-10-CM

## 2019-01-17 NOTE — TELEPHONE ENCOUNTER
Patient would like to know if he needs any labs done prior to his office visit on Monday.   Future Appointments   Date Time Provider Arturo Cassie   1/21/2019  8:30 AM Daisy Cabrera MD EM[facility] [address]   5/3/2019  9:20 AM Juana Kauffman MD [facility]

## 2019-01-17 NOTE — TELEPHONE ENCOUNTER
Last labs: 7/30/18  Please advise   Future Appointments   Date Time Provider Arturo Cassie   1/21/2019  8:30 AM Alisa Dakin, MD [facility] [address]   5/3/2019  9:20 AM MD GIBSON Buckley     LOV: 7/30/18  6 mos

## 2019-01-18 NOTE — TELEPHONE ENCOUNTER
Left detailed message informing pt to complete lab prior to appt. Advised to fast 8 hrs prior to completing labs.     Orders placed

## 2019-01-21 ENCOUNTER — OFFICE VISIT (OUTPATIENT)
Dept: FAMILY MEDICINE CLINIC | Facility: CLINIC | Age: 74
End: 2019-01-21
Payer: COMMERCIAL

## 2019-01-21 ENCOUNTER — LAB ENCOUNTER (OUTPATIENT)
Dept: LAB | Age: 74
End: 2019-01-21
Attending: FAMILY MEDICINE
Payer: MEDICARE

## 2019-01-21 VITALS
WEIGHT: 191 LBS | DIASTOLIC BLOOD PRESSURE: 60 MMHG | HEIGHT: 66.5 IN | SYSTOLIC BLOOD PRESSURE: 106 MMHG | HEART RATE: 72 BPM | TEMPERATURE: 97 F | BODY MASS INDEX: 30.34 KG/M2 | RESPIRATION RATE: 16 BRPM

## 2019-01-21 DIAGNOSIS — Z12.5 SCREENING FOR PROSTATE CANCER: ICD-10-CM

## 2019-01-21 DIAGNOSIS — I50.22 CHRONIC SYSTOLIC CONGESTIVE HEART FAILURE (HCC): Primary | ICD-10-CM

## 2019-01-21 DIAGNOSIS — Z00.00 ROUTINE GENERAL MEDICAL EXAMINATION AT HEALTH CARE FACILITY: ICD-10-CM

## 2019-01-21 DIAGNOSIS — J32.9 SINUSITIS, UNSPECIFIED CHRONICITY, UNSPECIFIED LOCATION: ICD-10-CM

## 2019-01-21 DIAGNOSIS — I48.20 CHRONIC ATRIAL FIBRILLATION (HCC): ICD-10-CM

## 2019-01-21 DIAGNOSIS — R73.03 PREDIABETES: ICD-10-CM

## 2019-01-21 LAB
ALBUMIN SERPL-MCNC: 3.9 G/DL (ref 3.1–4.5)
ALBUMIN/GLOB SERPL: 1.1 {RATIO} (ref 1–2)
ALP LIVER SERPL-CCNC: 53 U/L (ref 45–117)
ALT SERPL-CCNC: 29 U/L (ref 17–63)
ANION GAP SERPL CALC-SCNC: 5 MMOL/L (ref 0–18)
AST SERPL-CCNC: 27 U/L (ref 15–41)
BASOPHILS # BLD AUTO: 0.04 X10(3) UL (ref 0–0.1)
BASOPHILS NFR BLD AUTO: 0.5 %
BILIRUB SERPL-MCNC: 1.2 MG/DL (ref 0.1–2)
BUN BLD-MCNC: 13 MG/DL (ref 8–20)
BUN/CREAT SERPL: 10.7 (ref 10–20)
CALCIUM BLD-MCNC: 8.7 MG/DL (ref 8.3–10.3)
CHLORIDE SERPL-SCNC: 108 MMOL/L (ref 101–111)
CHOLEST SMN-MCNC: 138 MG/DL (ref ?–200)
CO2 SERPL-SCNC: 28 MMOL/L (ref 22–32)
COMPLEXED PSA SERPL-MCNC: 1.48 NG/ML (ref 0.01–4)
CREAT BLD-MCNC: 1.21 MG/DL (ref 0.7–1.3)
EOSINOPHIL # BLD AUTO: 0.23 X10(3) UL (ref 0–0.3)
EOSINOPHIL NFR BLD AUTO: 2.9 %
ERYTHROCYTE [DISTWIDTH] IN BLOOD BY AUTOMATED COUNT: 13.9 % (ref 11.5–16)
EST. AVERAGE GLUCOSE BLD GHB EST-MCNC: 123 MG/DL (ref 68–126)
GLOBULIN PLAS-MCNC: 3.7 G/DL (ref 2.8–4.4)
GLUCOSE BLD-MCNC: 96 MG/DL (ref 70–99)
HBA1C MFR BLD HPLC: 5.9 % (ref ?–5.7)
HCT VFR BLD AUTO: 47.8 % (ref 37–53)
HDLC SERPL-MCNC: 46 MG/DL (ref 40–59)
HGB BLD-MCNC: 15.9 G/DL (ref 13–17)
IMMATURE GRANULOCYTE COUNT: 0.02 X10(3) UL (ref 0–1)
IMMATURE GRANULOCYTE RATIO %: 0.3 %
LDLC SERPL CALC-MCNC: 78 MG/DL (ref ?–100)
LYMPHOCYTES # BLD AUTO: 1.32 X10(3) UL (ref 0.9–4)
LYMPHOCYTES NFR BLD AUTO: 16.9 %
M PROTEIN MFR SERPL ELPH: 7.6 G/DL (ref 6.4–8.2)
MCH RBC QN AUTO: 31.4 PG (ref 27–33.2)
MCHC RBC AUTO-ENTMCNC: 33.3 G/DL (ref 31–37)
MCV RBC AUTO: 94.3 FL (ref 80–99)
MONOCYTES # BLD AUTO: 1.12 X10(3) UL (ref 0.1–1)
MONOCYTES NFR BLD AUTO: 14.4 %
NEUTROPHIL ABS PRELIM: 5.07 X10 (3) UL (ref 1.3–6.7)
NEUTROPHILS # BLD AUTO: 5.07 X10(3) UL (ref 1.3–6.7)
NEUTROPHILS NFR BLD AUTO: 65 %
NONHDLC SERPL-MCNC: 92 MG/DL (ref ?–130)
OSMOLALITY SERPL CALC.SUM OF ELEC: 292 MOSM/KG (ref 275–295)
PLATELET # BLD AUTO: 168 10(3)UL (ref 150–450)
POTASSIUM SERPL-SCNC: 4.4 MMOL/L (ref 3.6–5.1)
RBC # BLD AUTO: 5.07 X10(6)UL (ref 3.8–5.8)
RED CELL DISTRIBUTION WIDTH-SD: 47.3 FL (ref 35.1–46.3)
SODIUM SERPL-SCNC: 141 MMOL/L (ref 136–144)
TRIGL SERPL-MCNC: 69 MG/DL (ref 30–149)
VLDLC SERPL CALC-MCNC: 14 MG/DL (ref 0–30)
WBC # BLD AUTO: 7.8 X10(3) UL (ref 4–13)

## 2019-01-21 PROCEDURE — 99214 OFFICE O/P EST MOD 30 MIN: CPT | Performed by: FAMILY MEDICINE

## 2019-01-21 PROCEDURE — 80053 COMPREHEN METABOLIC PANEL: CPT

## 2019-01-21 PROCEDURE — 36415 COLL VENOUS BLD VENIPUNCTURE: CPT

## 2019-01-21 PROCEDURE — 85025 COMPLETE CBC W/AUTO DIFF WBC: CPT

## 2019-01-21 PROCEDURE — 80061 LIPID PANEL: CPT

## 2019-01-21 PROCEDURE — 83036 HEMOGLOBIN GLYCOSYLATED A1C: CPT

## 2019-01-21 RX ORDER — AMOXICILLIN 875 MG/1
875 TABLET, COATED ORAL 2 TIMES DAILY
Qty: 20 TABLET | Refills: 0 | Status: SHIPPED | OUTPATIENT
Start: 2019-01-21 | End: 2019-01-31

## 2019-01-21 NOTE — PROGRESS NOTES
HPI:    Patient ID: Abbey Oliveros is a 74year old male. HPI  Mr. Juliette Méndez is a pleasant 75-year-old gentleman with history of A. fib on anticoagulation with warfarin, COPD, hyperlipidemia, prediabetes who is here for his follow-up appointment.   He has b AS DIRECTED BY COUMADIN CLINIC Disp: 30 tablet Rfl: 3   ascorbic acid (VITAMIN C) 500 MG Oral Tab Take 500 mg by mouth daily. Disp:  Rfl:    Cholecalciferol (VITAMIN D) 1000 UNITS Oral Cap Take 1 Cap by mouth daily.  Disp:  Rfl:      Allergies:No Known Allen Prescriptions Disp Refills   • amoxicillin 875 MG Oral Tab 20 tablet 0     Sig: Take 1 tablet (875 mg total) by mouth 2 (two) times daily for 10 days.        Imaging & Referrals:  None       CD#[id number]

## 2019-01-21 NOTE — PATIENT INSTRUCTIONS
Thank you for choosing Brian Alfonso MD at [place] [address]  To Do: Yoel Ashby  1. Please take meds as directed. [facility] is located in [address]. Monday, Tuesday & Friday – 8 a.m. to 4 p.m.   Wednesday, Thursday – 7 a.m. to 3 p.m those potential risks and we strive to make you healthier and to improve your quality of life.     Referrals, and Radiology Information:    If your insurance requires a referral to a specialist, please allow 5 business days to process your referral request.

## 2019-02-27 DIAGNOSIS — Z79.01 LONG TERM (CURRENT) USE OF ANTICOAGULANTS: ICD-10-CM

## 2019-02-27 DIAGNOSIS — I34.0 NON-RHEUMATIC MITRAL REGURGITATION: ICD-10-CM

## 2019-02-27 DIAGNOSIS — I48.20 CHRONIC ATRIAL FIBRILLATION (HCC): ICD-10-CM

## 2019-02-27 RX ORDER — ATORVASTATIN CALCIUM 10 MG/1
TABLET, FILM COATED ORAL
Qty: 90 TABLET | Refills: 1 | Status: SHIPPED | OUTPATIENT
Start: 2019-02-27 | End: 2019-09-25

## 2019-02-27 RX ORDER — FUROSEMIDE 20 MG/1
TABLET ORAL
Qty: 90 TABLET | Refills: 1 | Status: SHIPPED | OUTPATIENT
Start: 2019-02-27 | End: 2019-09-21

## 2019-02-27 RX ORDER — DILTIAZEM HYDROCHLORIDE 240 MG/1
CAPSULE, COATED, EXTENDED RELEASE ORAL
Qty: 90 CAPSULE | Refills: 1 | Status: SHIPPED | OUTPATIENT
Start: 2019-02-27 | End: 2019-09-21

## 2019-02-27 NOTE — TELEPHONE ENCOUNTER
Requesting furosemide, atorvastatin, diltiazem  LOV: 1/21/19  RTC: 6 months  Last Relevant Labs: 1/21/19      Future Appointments   Date Time Provider Arturo Matthews   5/3/2019  9:20 AM MD GIBSON Cardoso   8/5/2019  8:00 AM Wali

## 2019-04-22 ENCOUNTER — TELEPHONE (OUTPATIENT)
Dept: FAMILY MEDICINE CLINIC | Facility: CLINIC | Age: 74
End: 2019-04-22

## 2019-04-22 DIAGNOSIS — Z98.890 HISTORY OF DETACHED RETINA REPAIR: ICD-10-CM

## 2019-04-22 DIAGNOSIS — Z86.69 HISTORY OF DETACHED RETINA REPAIR: ICD-10-CM

## 2019-04-22 DIAGNOSIS — H26.9 CATARACT OF BOTH EYES, UNSPECIFIED CATARACT TYPE: Primary | ICD-10-CM

## 2019-04-22 NOTE — TELEPHONE ENCOUNTER
Miranda Shaikh  P Em[facility] 20 Clinical Staff   Cc: Tish Pike Central Referral Pool   Phone Number: [phone]             Patient Name: Alvina Simons   : 41   Reason for the order/referral: 2nd cataracts   PCP: Shantal Youngblood   Refer to Provider

## 2019-07-25 ENCOUNTER — MA CHART PREP (OUTPATIENT)
Dept: FAMILY MEDICINE CLINIC | Facility: CLINIC | Age: 74
End: 2019-07-25

## 2019-08-05 ENCOUNTER — OFFICE VISIT (OUTPATIENT)
Dept: FAMILY MEDICINE CLINIC | Facility: CLINIC | Age: 74
End: 2019-08-05
Payer: COMMERCIAL

## 2019-08-05 VITALS
RESPIRATION RATE: 14 BRPM | BODY MASS INDEX: 31.13 KG/M2 | SYSTOLIC BLOOD PRESSURE: 106 MMHG | HEART RATE: 50 BPM | HEIGHT: 66.5 IN | TEMPERATURE: 98 F | WEIGHT: 196 LBS | DIASTOLIC BLOOD PRESSURE: 60 MMHG

## 2019-08-05 DIAGNOSIS — N18.30 CKD (CHRONIC KIDNEY DISEASE) STAGE 3, GFR 30-59 ML/MIN (HCC): Chronic | ICD-10-CM

## 2019-08-05 DIAGNOSIS — Z51.81 MONITORING FOR LONG-TERM ANTICOAGULANT USE: ICD-10-CM

## 2019-08-05 DIAGNOSIS — Z79.01 MONITORING FOR LONG-TERM ANTICOAGULANT USE: ICD-10-CM

## 2019-08-05 DIAGNOSIS — R73.03 PREDIABETES: ICD-10-CM

## 2019-08-05 DIAGNOSIS — I70.0 AORTIC ATHEROSCLEROSIS (HCC): ICD-10-CM

## 2019-08-05 DIAGNOSIS — I50.22 CHRONIC SYSTOLIC CONGESTIVE HEART FAILURE (HCC): ICD-10-CM

## 2019-08-05 DIAGNOSIS — Z00.00 ENCOUNTER FOR MEDICARE ANNUAL WELLNESS EXAM: Primary | ICD-10-CM

## 2019-08-05 DIAGNOSIS — Z12.5 SCREENING FOR MALIGNANT NEOPLASM OF PROSTATE: ICD-10-CM

## 2019-08-05 DIAGNOSIS — D64.9 ANEMIA, UNSPECIFIED TYPE: ICD-10-CM

## 2019-08-05 DIAGNOSIS — I48.20 CHRONIC ATRIAL FIBRILLATION (HCC): ICD-10-CM

## 2019-08-05 DIAGNOSIS — J44.9 CHRONIC OBSTRUCTIVE PULMONARY DISEASE, UNSPECIFIED COPD TYPE (HCC): ICD-10-CM

## 2019-08-05 DIAGNOSIS — Z00.00 ENCOUNTER FOR ANNUAL HEALTH EXAMINATION: ICD-10-CM

## 2019-08-05 DIAGNOSIS — E78.2 MIXED HYPERLIPIDEMIA: ICD-10-CM

## 2019-08-05 DIAGNOSIS — I35.8 AORTIC VALVE SCLEROSIS: ICD-10-CM

## 2019-08-05 DIAGNOSIS — I34.0 NONRHEUMATIC MITRAL VALVE REGURGITATION: ICD-10-CM

## 2019-08-05 PROCEDURE — G0439 PPPS, SUBSEQ VISIT: HCPCS | Performed by: FAMILY MEDICINE

## 2019-08-05 PROCEDURE — 99397 PER PM REEVAL EST PAT 65+ YR: CPT | Performed by: FAMILY MEDICINE

## 2019-08-05 PROCEDURE — 96160 PT-FOCUSED HLTH RISK ASSMT: CPT | Performed by: FAMILY MEDICINE

## 2019-08-05 RX ORDER — WARFARIN SODIUM 2.5 MG/1
TABLET ORAL
Qty: 30 TABLET | Refills: 3 | Status: SHIPPED | OUTPATIENT
Start: 2019-08-05 | End: 2020-12-21

## 2019-08-05 RX ORDER — WARFARIN SODIUM 5 MG/1
TABLET ORAL
Qty: 90 TABLET | Refills: 3 | Status: SHIPPED | OUTPATIENT
Start: 2019-08-05 | End: 2020-12-21

## 2019-08-05 NOTE — PROGRESS NOTES
HPI:   Victoria Gutierrez is a 74year old male who presents for a MA (Medicare Advantage) [facility] (Once per calendar year).     Mr. Jazmine Joy is a pleasant 41-year-old gentleman with history of A. fib on anticoagulation with warfarin, COPD, hyperlipidemia, pre Miguel Angel Busby was screened for Alcohol abuse and had a score of 0 so is at low risk.      Patient Care Team: Patient Care Team:  Aubrie Sultana MD as PCP - General (Family Medicine)  Michael Gates MD (Cardiovascular Diseases)  Aron Drivers, Katheran Schirmer A (C anticoagulant use (11/5/2015), Other and unspecified hyperlipidemia, Perforated bowel ([facility] Prediabetes (10/9/2014), SOB (shortness of breath), Unspecified essential hypertension, and Visual impairment.     He  has a past surgical history that includes col and breath sounds normal. No respiratory distress. He has no wheezes. He has no rales. Abdominal: Soft. Bowel sounds are normal. He exhibits no distension and no mass. There is no tenderness. Musculoskeletal: He exhibits no edema.    Lymphadenopathy: ml/min (HCC)  -chronic stable issue, continue present management with observation and medications as noted    Anemia, unspecified type  -chronic stable issue, continue present management with observation and medications as noted    Prediabetes  -chronic st flowsheet data found. Glaucoma Screening      Ophthalmology Visit Annually: Diabetics, FHx Glaucoma, AA>50, > 65 No flowsheet data found.     Prostate Cancer Screening      PSA  Annually PSA due on 01/21/2021  Update Health Maintenance if applica found.

## 2019-08-05 NOTE — PATIENT INSTRUCTIONS
Thank you for choosing Farida Merrill MD at [place] [address]  To Do: Carly Harris  1. Please see age appropriate health prevention below    [employer] is located in [address]. Monday, Tuesday & Friday – 8 a.m. to 4 p.m.   Wednesday, Thurs the benefits outweigh those potential risks and we strive to make you healthier and to improve your quality of life.     Referrals, and Radiology Information:    If your insurance requires a referral to a specialist, please allow 5 business days to process have ever smoked 1 ultrasound   Alcohol misuse All men in this age group At routine exams   Blood pressure All men in this age group Yearly checkup if your blood pressure is normal  Normal blood pressure is less than 120/80 mm Hg  If your blood pressure re talk with your healthcare provider Ask your healthcare provider   Vision All men in this age group Every 1 to 2 years; if you have a chronic health condition, ask your healthcare provider if you needs exams more often   Vaccine Who needs it How often   Chi effects it can cause All men in this age group Every visit   [address] [place] [address] [facility]   Date Last Reviewed: 2/1/2017  © 6786-7275 [employer]. [facility] [facility], [address] [place] [address]. All rights reserved.  This information is n 01/21/2019 69        EKG - covered if needed at Welcome to Medicare, and non-screening if indicated for medical reasons    Electrocardiogram date05/11/2018 Routine EKG is not a screening covered service except at the [facility] Visit    Abdomina (Pneumovax)  Covered Once after 65 No orders found for this or any previous visit. Please get once after your 65th birthday    Hepatitis B for Moderate/High Risk No orders found for this or any previous visit.  Medium/high risk factors:   End-stage renal di

## 2019-08-07 LAB
ABSOLUTE BASOPHILS: 39 CELLS/UL (ref 0–200)
ABSOLUTE EOSINOPHILS: 109 CELLS/UL (ref 15–500)
ABSOLUTE LYMPHOCYTES: 1513 CELLS/UL (ref 850–3900)
ABSOLUTE MONOCYTES: 983 CELLS/UL (ref 200–950)
ABSOLUTE NEUTROPHILS: 5156 CELLS/UL (ref 1500–7800)
ALBUMIN/GLOBULIN RATIO: 1.5 (CALC) (ref 1–2.5)
ALBUMIN: 4 G/DL (ref 3.6–5.1)
ALKALINE PHOSPHATASE: 48 U/L (ref 40–115)
ALT: 13 U/L (ref 9–46)
AST: 17 U/L (ref 10–35)
BASOPHILS: 0.5 %
BILIRUBIN, TOTAL: 1.4 MG/DL (ref 0.2–1.2)
BUN: 14 MG/DL (ref 7–25)
CALCIUM: 9.1 MG/DL (ref 8.6–10.3)
CARBON DIOXIDE: 29 MMOL/L (ref 20–32)
CHLORIDE: 103 MMOL/L (ref 98–110)
CHOL/HDLC RATIO: 3.1 (CALC)
CHOLESTEROL, TOTAL: 129 MG/DL
CREATININE: 1.1 MG/DL (ref 0.7–1.18)
EGFR IF AFRICN AM: 76 ML/MIN/1.73M2
EGFR IF NONAFRICN AM: 65 ML/MIN/1.73M2
EOSINOPHILS: 1.4 %
GLOBULIN: 2.6 G/DL (CALC) (ref 1.9–3.7)
GLUCOSE: 94 MG/DL (ref 65–99)
HDL CHOLESTEROL: 41 MG/DL
HEMATOCRIT: 42.6 % (ref 38.5–50)
HEMOGLOBIN A1C: 5.7 % OF TOTAL HGB
HEMOGLOBIN: 14.2 G/DL (ref 13.2–17.1)
LDL-CHOLESTEROL: 72 MG/DL (CALC)
LYMPHOCYTES: 19.4 %
MCH: 30.4 PG (ref 27–33)
MCHC: 33.3 G/DL (ref 32–36)
MCV: 91.2 FL (ref 80–100)
MONOCYTES: 12.6 %
MPV: 10.9 FL (ref 7.5–12.5)
NEUTROPHILS: 66.1 %
NON-HDL CHOLESTEROL: 88 MG/DL (CALC)
PLATELET COUNT: 185 THOUSAND/UL (ref 140–400)
POTASSIUM: 4.5 MMOL/L (ref 3.5–5.3)
PROTEIN, TOTAL: 6.6 G/DL (ref 6.1–8.1)
RDW: 12.5 % (ref 11–15)
RED BLOOD CELL COUNT: 4.67 MILLION/UL (ref 4.2–5.8)
SODIUM: 139 MMOL/L (ref 135–146)
TOTAL PSA: 1.1 NG/ML
TRIGLYCERIDES: 82 MG/DL
WHITE BLOOD CELL COUNT: 7.8 THOUSAND/UL (ref 3.8–10.8)

## 2019-09-21 DIAGNOSIS — I34.0 NON-RHEUMATIC MITRAL REGURGITATION: ICD-10-CM

## 2019-09-21 DIAGNOSIS — I48.20 CHRONIC ATRIAL FIBRILLATION (HCC): ICD-10-CM

## 2019-09-21 DIAGNOSIS — Z79.01 LONG TERM (CURRENT) USE OF ANTICOAGULANTS: ICD-10-CM

## 2019-09-23 RX ORDER — DILTIAZEM HYDROCHLORIDE 240 MG/1
CAPSULE, COATED, EXTENDED RELEASE ORAL
Qty: 90 CAPSULE | Refills: 1 | Status: SHIPPED | OUTPATIENT
Start: 2019-09-23 | End: 2020-04-20

## 2019-09-23 RX ORDER — FUROSEMIDE 20 MG/1
TABLET ORAL
Qty: 90 TABLET | Refills: 1 | Status: SHIPPED | OUTPATIENT
Start: 2019-09-23 | End: 2020-04-20

## 2019-09-23 NOTE — TELEPHONE ENCOUNTER
Requested Medications      Name from pharmacy: [address] [place] [address]         Will file in chart as: FUROSEMIDE 20 MG Oral Tab    Sig: TAKE 1 TABLET BY MOUTH ONCE DAILY    Disp:  90 tablet    Refills:  1 (Pharmacy requested: Not specified)    Start: 9/21/20

## 2019-09-25 DIAGNOSIS — Z79.01 LONG TERM (CURRENT) USE OF ANTICOAGULANTS: ICD-10-CM

## 2019-09-25 DIAGNOSIS — I34.0 NON-RHEUMATIC MITRAL REGURGITATION: ICD-10-CM

## 2019-09-25 DIAGNOSIS — I48.20 CHRONIC ATRIAL FIBRILLATION (HCC): ICD-10-CM

## 2019-09-25 RX ORDER — ATORVASTATIN CALCIUM 10 MG/1
TABLET, FILM COATED ORAL
Qty: 90 TABLET | Refills: 1 | Status: SHIPPED | OUTPATIENT
Start: 2019-09-25 | End: 2020-04-20

## 2019-09-25 NOTE — TELEPHONE ENCOUNTER
Requested Medications      Name from pharmacy: ATORVASTATIN  10MG  TAB         Will file in chart as: ATORVASTATIN 10 MG Oral Tab    Sig: TAKE 1 TABLET BY MOUTH ONCE DAILY    Disp:  90 tablet    Refills:  1 (Pharmacy requested: Not specified)    Start: 9/2

## 2019-12-04 ENCOUNTER — TELEPHONE (OUTPATIENT)
Dept: FAMILY MEDICINE CLINIC | Facility: CLINIC | Age: 74
End: 2019-12-04

## 2019-12-04 DIAGNOSIS — Z85.828 HISTORY OF SKIN CANCER: Primary | ICD-10-CM

## 2019-12-04 NOTE — TELEPHONE ENCOUNTER
VERY URGENT REFERRAL REQUEST   Received: Today   Message Contents   Roshan Carranza Emg 20 Clinical Staff   Cc: [facility] Referral [address] [facility]      Cate Mcdowell [[id number]]     . Reason for the order/referral:VERY URGENT REFERRAL REQUEST   PC

## 2020-01-09 ENCOUNTER — TELEPHONE (OUTPATIENT)
Dept: FAMILY MEDICINE CLINIC | Facility: CLINIC | Age: 75
End: 2020-01-09

## 2020-01-09 DIAGNOSIS — C44.41 BASAL CELL CARCINOMA, SCALP/NECK: Primary | ICD-10-CM

## 2020-01-09 NOTE — TELEPHONE ENCOUNTER
Spoke to Stefano, pt is scheduled for Mohs procedure with Dr. Dinh James on 1/15/2020. Stefano requests referral be faxed to her once authorized.  Fax #: [phone]

## 2020-01-09 NOTE — TELEPHONE ENCOUNTER
Pt is scheduled with [place] dermat[facility] on Wed, 1/15/2020. Trent Negrete is requesting a referral. They are not allowed to submit it because pt has [facility].

## 2020-02-26 ENCOUNTER — OFFICE VISIT (OUTPATIENT)
Dept: FAMILY MEDICINE CLINIC | Facility: CLINIC | Age: 75
End: 2020-02-26
Payer: COMMERCIAL

## 2020-02-26 VITALS
DIASTOLIC BLOOD PRESSURE: 70 MMHG | RESPIRATION RATE: 16 BRPM | WEIGHT: 192 LBS | SYSTOLIC BLOOD PRESSURE: 114 MMHG | TEMPERATURE: 98 F | BODY MASS INDEX: 30.49 KG/M2 | HEART RATE: 76 BPM | HEIGHT: 66.5 IN

## 2020-02-26 DIAGNOSIS — Z00.00 ENCOUNTER FOR MEDICARE ANNUAL WELLNESS EXAM: Primary | ICD-10-CM

## 2020-02-26 DIAGNOSIS — I70.0 AORTIC ATHEROSCLEROSIS (HCC): ICD-10-CM

## 2020-02-26 DIAGNOSIS — J44.9 CHRONIC OBSTRUCTIVE PULMONARY DISEASE, UNSPECIFIED COPD TYPE (HCC): ICD-10-CM

## 2020-02-26 DIAGNOSIS — N40.1 BENIGN PROSTATIC HYPERPLASIA WITH URINARY HESITANCY: ICD-10-CM

## 2020-02-26 DIAGNOSIS — I48.21 PERMANENT ATRIAL FIBRILLATION (HCC): ICD-10-CM

## 2020-02-26 DIAGNOSIS — Z79.01 MONITORING FOR LONG-TERM ANTICOAGULANT USE: ICD-10-CM

## 2020-02-26 DIAGNOSIS — D64.9 ANEMIA, UNSPECIFIED TYPE: ICD-10-CM

## 2020-02-26 DIAGNOSIS — I35.8 AORTIC VALVE SCLEROSIS: ICD-10-CM

## 2020-02-26 DIAGNOSIS — Z12.5 SCREENING FOR MALIGNANT NEOPLASM OF PROSTATE: ICD-10-CM

## 2020-02-26 DIAGNOSIS — Z00.00 ENCOUNTER FOR ANNUAL HEALTH EXAMINATION: ICD-10-CM

## 2020-02-26 DIAGNOSIS — N18.30 CKD (CHRONIC KIDNEY DISEASE) STAGE 3, GFR 30-59 ML/MIN (HCC): ICD-10-CM

## 2020-02-26 DIAGNOSIS — I50.22 CHRONIC SYSTOLIC CONGESTIVE HEART FAILURE (HCC): ICD-10-CM

## 2020-02-26 DIAGNOSIS — I34.0 NONRHEUMATIC MITRAL VALVE REGURGITATION: ICD-10-CM

## 2020-02-26 DIAGNOSIS — Z51.81 MONITORING FOR LONG-TERM ANTICOAGULANT USE: ICD-10-CM

## 2020-02-26 DIAGNOSIS — R73.03 PREDIABETES: ICD-10-CM

## 2020-02-26 DIAGNOSIS — R39.11 BENIGN PROSTATIC HYPERPLASIA WITH URINARY HESITANCY: ICD-10-CM

## 2020-02-26 DIAGNOSIS — E78.2 MIXED HYPERLIPIDEMIA: ICD-10-CM

## 2020-02-26 PROCEDURE — G0439 PPPS, SUBSEQ VISIT: HCPCS | Performed by: FAMILY MEDICINE

## 2020-02-26 PROCEDURE — 99397 PER PM REEVAL EST PAT 65+ YR: CPT | Performed by: FAMILY MEDICINE

## 2020-02-26 PROCEDURE — 96160 PT-FOCUSED HLTH RISK ASSMT: CPT | Performed by: FAMILY MEDICINE

## 2020-02-26 RX ORDER — TAMSULOSIN HYDROCHLORIDE 0.4 MG/1
0.4 CAPSULE ORAL DAILY
Qty: 90 CAPSULE | Refills: 1 | Status: SHIPPED | OUTPATIENT
Start: 2020-02-26 | End: 2020-07-20

## 2020-02-26 NOTE — PROGRESS NOTES
HPI:   Fernanda Rodrigez is a 66year old male who presents for a MA (Medicare Advantage) [facility] (Once per calendar year).     Mr. Aleksandar Rudolph is a pleasant 49-year-old gentleman with history of A. fib on anticoagulation with warfarin, COPD, hyperlipidemia, pre Never Used         CAGE Alcohol screening   Carly Harris was screened for Alcohol abuse and had a score of 0 so is at low risk.      Patient Care Team: Patient Care Team:  Daisy Cabrera MD as PCP - General (Family Medicine)  Juana Kauffman MD (Thomas Stacks ONCE DAILY  Warfarin Sodium 5 MG Oral Tab, TAKE ONE-TABLET 5-DAYS WEEKLY OR AS DIRECTED BY ANTICOAGULATION CLINIC  Warfarin Sodium 2.5 MG Oral Tab, Take one tablet by mouth two nights per week OR AS DIRECTED BY COUMADIN CLINIC  ascorbic acid (VITAMIN C) 50 Location: Right arm, Patient Position: Sitting, Cuff Size: adult)   Pulse 76   Temp 97.9 °F (36.6 °C) (Temporal)   Resp 16   Ht 66.5\"   Wt 192 lb (87.1 kg)   BMI 30.53 kg/m²   Estimated body mass index is 30.53 kg/m² as calculated from the following:    H pulmonary disease, unspecified COPD type ([facility]  -chronic stable issue, continue present management with observation and medications as noted     Aortic atherosclerosis ([facility]  -chronic stable issue, continue present management with observation and medicatio been poor?: No  How does the patient maintain a good energy level?: Appropriate Exercise;Stretching;Daily Walks  How would you describe your daily physical activity?: Moderate  How would you describe your current health state?: Good  How do you maintain po 02/25/2010 Please get once after your 65th birthday    Hepatitis B for Moderate/High Risk No vaccine history found Medium/high risk factors:   End-stage renal disease   Hemophiliacs who received Factor VIII or IX concentrates   Clients of institutions for

## 2020-02-26 NOTE — PATIENT INSTRUCTIONS
Thank you for choosing Judd Bazan MD at [place] [address]  To Do: Jr Viera  1. Please see age appropriate health prevention below    [employer] is located in [address] Monday, Tuesday & Friday – 8 a.m. to 4 p.m.   Wednesday, Thurs the benefits outweigh those potential risks and we strive to make you healthier and to improve your quality of life.     Referrals, and Radiology Information:    If your insurance requires a referral to a specialist, please allow 5 business days to process have ever smoked 1 ultrasound   Alcohol misuse All men in this age group At routine exams   Blood pressure All men in this age group Yearly checkup if your blood pressure is normal  Normal blood pressure is less than 120/80 mm Hg  If your blood pressure re talk with your healthcare provider Ask your healthcare provider   Vision All men in this age group Every 1 to 2 years; if you have a chronic health condition, ask your healthcare provider if you needs exams more often   Vaccine Who needs it How often   Chi effects it can cause All men in this age group Every visit   [address] [place] [address] [facility]   Date Last Reviewed: 2/1/2017  © 0505-1151 [employer]. [facility] [place], [address] [place] [address]. All rights reserved.  This information is n screening covered service except at the Welcome to Medicare Visit    Abdominal aortic aneurysm screening (once between ages 73-68)  No results found for this or any previous visit.  Limited to patients who meet one of the following criteria:   • Men who are for Moderate/High Risk No orders found for this or any previous visit.  Medium/high risk factors:   End-stage renal disease   Hemophiliacs who received Factor VIII or IX concentrates   Clients of institutions for the mentally retarded   Persons who live in

## 2020-03-03 LAB
ABSOLUTE BASOPHILS: 78 CELLS/UL (ref 0–200)
ABSOLUTE EOSINOPHILS: 132 CELLS/UL (ref 15–500)
ABSOLUTE LYMPHOCYTES: 1392 CELLS/UL (ref 850–3900)
ABSOLUTE MONOCYTES: 834 CELLS/UL (ref 200–950)
ABSOLUTE NEUTROPHILS: 3564 CELLS/UL (ref 1500–7800)
ALBUMIN/GLOBULIN RATIO: 1.7 (CALC) (ref 1–2.5)
ALBUMIN: 4.1 G/DL (ref 3.6–5.1)
ALKALINE PHOSPHATASE: 41 U/L (ref 35–144)
ALT: 11 U/L (ref 9–46)
AST: 18 U/L (ref 10–35)
BASOPHILS: 1.3 %
BILIRUBIN, TOTAL: 1 MG/DL (ref 0.2–1.2)
BUN: 11 MG/DL (ref 7–25)
CALCIUM: 9.2 MG/DL (ref 8.6–10.3)
CARBON DIOXIDE: 26 MMOL/L (ref 20–32)
CHLORIDE: 106 MMOL/L (ref 98–110)
CHOL/HDLC RATIO: 3.3 (CALC)
CHOLESTEROL, TOTAL: 143 MG/DL
CREATININE: 1.12 MG/DL (ref 0.7–1.18)
EGFR IF AFRICN AM: 74 ML/MIN/1.73M2
EGFR IF NONAFRICN AM: 63 ML/MIN/1.73M2
EOSINOPHILS: 2.2 %
GLOBULIN: 2.4 G/DL (CALC) (ref 1.9–3.7)
GLUCOSE: 107 MG/DL (ref 65–99)
HDL CHOLESTEROL: 43 MG/DL
HEMATOCRIT: 43.9 % (ref 38.5–50)
HEMOGLOBIN A1C: 5.6 % OF TOTAL HGB
HEMOGLOBIN: 15.3 G/DL (ref 13.2–17.1)
LDL-CHOLESTEROL: 81 MG/DL (CALC)
LYMPHOCYTES: 23.2 %
MCH: 31.9 PG (ref 27–33)
MCHC: 34.9 G/DL (ref 32–36)
MCV: 91.5 FL (ref 80–100)
MONOCYTES: 13.9 %
MPV: 10.5 FL (ref 7.5–12.5)
NEUTROPHILS: 59.4 %
NON-HDL CHOLESTEROL: 100 MG/DL (CALC)
PLATELET COUNT: 188 THOUSAND/UL (ref 140–400)
POTASSIUM: 4.4 MMOL/L (ref 3.5–5.3)
PROTEIN, TOTAL: 6.5 G/DL (ref 6.1–8.1)
RDW: 12.9 % (ref 11–15)
RED BLOOD CELL COUNT: 4.8 MILLION/UL (ref 4.2–5.8)
SODIUM: 140 MMOL/L (ref 135–146)
TRIGLYCERIDES: 99 MG/DL
TSH W/REFLEX TO FT4: 0.98 MIU/L (ref 0.4–4.5)
WHITE BLOOD CELL COUNT: 6 THOUSAND/UL (ref 3.8–10.8)

## 2020-03-11 ENCOUNTER — OFFICE VISIT (OUTPATIENT)
Dept: FAMILY MEDICINE CLINIC | Facility: CLINIC | Age: 75
End: 2020-03-11
Payer: COMMERCIAL

## 2020-03-11 VITALS
RESPIRATION RATE: 16 BRPM | HEIGHT: 66.5 IN | TEMPERATURE: 98 F | BODY MASS INDEX: 30.49 KG/M2 | SYSTOLIC BLOOD PRESSURE: 110 MMHG | DIASTOLIC BLOOD PRESSURE: 70 MMHG | WEIGHT: 192 LBS | HEART RATE: 78 BPM

## 2020-03-11 DIAGNOSIS — J01.00 ACUTE NON-RECURRENT MAXILLARY SINUSITIS: Primary | ICD-10-CM

## 2020-03-11 DIAGNOSIS — I48.21 PERMANENT ATRIAL FIBRILLATION (HCC): ICD-10-CM

## 2020-03-11 PROCEDURE — 99213 OFFICE O/P EST LOW 20 MIN: CPT | Performed by: FAMILY MEDICINE

## 2020-03-11 RX ORDER — AZITHROMYCIN 250 MG/1
TABLET, FILM COATED ORAL
Qty: 6 TABLET | Refills: 0 | Status: SHIPPED | OUTPATIENT
Start: 2020-03-11 | End: 2020-08-25 | Stop reason: ALTCHOICE

## 2020-03-11 NOTE — PROGRESS NOTES
HPI:    Patient ID: Dimitry Galicia is a 66year old male.     HPI  Mr. Deniz Woods is a pleasant 20-year-old gentleman with history of A. fib on anticoagulation with warfarin, COPD, hyperlipidemia, prediabetes here today for cough for the past 10 days associated tablet 3   • ascorbic acid (VITAMIN C) 500 MG Oral Tab Take 500 mg by mouth daily. • Cholecalciferol (VITAMIN D) 1000 UNITS Oral Cap Take 1 Cap by mouth daily.        Allergies:No Known Allergies   PHYSICAL EXAM:   Physical Exam    Constitutional: No di

## 2020-04-19 DIAGNOSIS — I34.0 NON-RHEUMATIC MITRAL REGURGITATION: ICD-10-CM

## 2020-04-19 DIAGNOSIS — I48.20 CHRONIC ATRIAL FIBRILLATION (HCC): ICD-10-CM

## 2020-04-19 DIAGNOSIS — Z79.01 LONG TERM (CURRENT) USE OF ANTICOAGULANTS: ICD-10-CM

## 2020-04-20 RX ORDER — ATORVASTATIN CALCIUM 10 MG/1
TABLET, FILM COATED ORAL
Qty: 90 TABLET | Refills: 0 | Status: SHIPPED | OUTPATIENT
Start: 2020-04-20 | End: 2020-06-10

## 2020-04-20 RX ORDER — FUROSEMIDE 20 MG/1
TABLET ORAL
Qty: 90 TABLET | Refills: 0 | Status: SHIPPED | OUTPATIENT
Start: 2020-04-20 | End: 2020-06-10

## 2020-04-20 RX ORDER — DILTIAZEM HYDROCHLORIDE 240 MG/1
CAPSULE, COATED, EXTENDED RELEASE ORAL
Qty: 90 CAPSULE | Refills: 0 | Status: SHIPPED | OUTPATIENT
Start: 2020-04-20 | End: 2020-06-10

## 2020-04-20 NOTE — TELEPHONE ENCOUNTER
Cholesterol Medication Protocol Passed4/19 4:54 AM   ALT < 80    ALT resulted within past year    Lipid panel within past 12 months    Appointment within past 12 or next 3 months     Hypertension Medications Protocol Passed4/19 4:54 AM   CMP or BMP in past

## 2020-06-09 DIAGNOSIS — I34.0 NON-RHEUMATIC MITRAL REGURGITATION: ICD-10-CM

## 2020-06-09 DIAGNOSIS — Z79.01 LONG TERM (CURRENT) USE OF ANTICOAGULANTS: ICD-10-CM

## 2020-06-09 DIAGNOSIS — I48.20 CHRONIC ATRIAL FIBRILLATION (HCC): ICD-10-CM

## 2020-06-10 RX ORDER — ATORVASTATIN CALCIUM 10 MG/1
TABLET, FILM COATED ORAL
Qty: 90 TABLET | Refills: 1 | Status: SHIPPED | OUTPATIENT
Start: 2020-06-10 | End: 2020-11-30

## 2020-06-10 RX ORDER — DILTIAZEM HYDROCHLORIDE 240 MG/1
CAPSULE, COATED, EXTENDED RELEASE ORAL
Qty: 90 CAPSULE | Refills: 1 | Status: SHIPPED | OUTPATIENT
Start: 2020-06-10 | End: 2020-11-30

## 2020-06-10 RX ORDER — FUROSEMIDE 20 MG/1
TABLET ORAL
Qty: 90 TABLET | Refills: 1 | Status: SHIPPED | OUTPATIENT
Start: 2020-06-10 | End: 2020-11-30

## 2020-06-10 NOTE — TELEPHONE ENCOUNTER
Hypertension Medications Protocol Passed6/9 9:05 PM   CMP or BMP in past 12 months    Last serum creatinine< 2.0    Appointment in past 6 or next 3 months       Cholesterol Medication Protocol Passed6/9 9:05 PM   ALT < 80    ALT resulted within past year

## 2020-07-20 RX ORDER — TAMSULOSIN HYDROCHLORIDE 0.4 MG/1
CAPSULE ORAL
Qty: 90 CAPSULE | Refills: 1 | Status: SHIPPED | OUTPATIENT
Start: 2020-07-20 | End: 2021-02-06

## 2020-07-20 NOTE — TELEPHONE ENCOUNTER
Requesting tamsulosin (FLOMAX) cap  LOV: 3/11/20  RTC:   Last Relevant Labs: 3/2/20  Filled: 2/26/20 # 90 with 1 refills    Future Appointments   Date Time Provider Arturo Matthews   7/22/2020 11:00 AM Eric Joy MD [facility]   8/25/2020

## 2020-08-24 ENCOUNTER — TELEPHONE (OUTPATIENT)
Dept: FAMILY MEDICINE CLINIC | Facility: CLINIC | Age: 75
End: 2020-08-24

## 2020-08-25 ENCOUNTER — TELEPHONE (OUTPATIENT)
Dept: FAMILY MEDICINE CLINIC | Facility: CLINIC | Age: 75
End: 2020-08-25

## 2020-08-25 ENCOUNTER — OFFICE VISIT (OUTPATIENT)
Dept: FAMILY MEDICINE CLINIC | Facility: CLINIC | Age: 75
End: 2020-08-25
Payer: COMMERCIAL

## 2020-08-25 VITALS
BODY MASS INDEX: 29.54 KG/M2 | TEMPERATURE: 98 F | WEIGHT: 186 LBS | SYSTOLIC BLOOD PRESSURE: 112 MMHG | HEIGHT: 66.5 IN | HEART RATE: 62 BPM | RESPIRATION RATE: 16 BRPM | OXYGEN SATURATION: 99 % | DIASTOLIC BLOOD PRESSURE: 68 MMHG

## 2020-08-25 DIAGNOSIS — I48.21 PERMANENT ATRIAL FIBRILLATION (HCC): ICD-10-CM

## 2020-08-25 DIAGNOSIS — E78.2 MIXED HYPERLIPIDEMIA: Primary | ICD-10-CM

## 2020-08-25 DIAGNOSIS — J44.9 CHRONIC OBSTRUCTIVE PULMONARY DISEASE, UNSPECIFIED COPD TYPE (HCC): ICD-10-CM

## 2020-08-25 DIAGNOSIS — Z86.69 HISTORY OF DETACHED RETINA REPAIR: Primary | ICD-10-CM

## 2020-08-25 DIAGNOSIS — Z98.890 HISTORY OF DETACHED RETINA REPAIR: Primary | ICD-10-CM

## 2020-08-25 LAB — PSA, TOTAL: 1.2 NG/ML

## 2020-08-25 PROCEDURE — 99214 OFFICE O/P EST MOD 30 MIN: CPT | Performed by: FAMILY MEDICINE

## 2020-08-25 PROCEDURE — 3074F SYST BP LT 130 MM HG: CPT | Performed by: FAMILY MEDICINE

## 2020-08-25 PROCEDURE — 3008F BODY MASS INDEX DOCD: CPT | Performed by: FAMILY MEDICINE

## 2020-08-25 PROCEDURE — 3078F DIAST BP <80 MM HG: CPT | Performed by: FAMILY MEDICINE

## 2020-08-25 NOTE — PATIENT INSTRUCTIONS
Thank you for choosing Mary Alex MD at [place] [address]  To Do: Nabil Rea  1. Please take meds as directed. [facility] is located in [address]. Monday, Tuesday & Friday – 8 a.m. to 4 p.m.   Wednesday, Thursday – 7 a.m. to 3 p outweigh those potential risks and we strive to make you healthier and to improve your quality of life.     Referrals, and Radiology Information:    If your insurance requires a referral to a specialist, please allow 5 business days to process your referral

## 2020-08-25 NOTE — TELEPHONE ENCOUNTER
Kiesha Anand  [facility]   Cc: [facility] Referral Pool   Phone Number: [phone]             .Reason for the order/referral:OPHTHALMOLOGY/F/UP   PCP: Wayne Jones   Refer to Provider Sosa Hodges   Patient In

## 2020-08-25 NOTE — PROGRESS NOTES
HPI:    Patient ID: Carly Harris is a 66year old male. HPI  Mr. Cecilio Nathan is a pleasant 68-year-old gentleman with history of A. fib on anticoagulation with warfarin, COPD, hyperlipidemia, prediabetes  here for his follow-up appointment.   He has been ta Cholecalciferol (VITAMIN D) 1000 UNITS Oral Cap Take 1 Cap by mouth daily. Allergies:No Known Allergies   PHYSICAL EXAM:   Physical Exam    Constitutional: No distress.    HENT:   Mouth/Throat: Oropharynx is clear and moist.   Eyes: Conjunctivae are n

## 2020-11-23 DIAGNOSIS — Z79.01 LONG TERM (CURRENT) USE OF ANTICOAGULANTS: ICD-10-CM

## 2020-11-23 DIAGNOSIS — I34.0 NON-RHEUMATIC MITRAL REGURGITATION: ICD-10-CM

## 2020-11-23 DIAGNOSIS — I48.20 CHRONIC ATRIAL FIBRILLATION (HCC): ICD-10-CM

## 2020-11-30 RX ORDER — DILTIAZEM HYDROCHLORIDE 240 MG/1
CAPSULE, COATED, EXTENDED RELEASE ORAL
Qty: 90 CAPSULE | Refills: 1 | Status: SHIPPED | OUTPATIENT
Start: 2020-11-30 | End: 2021-02-01

## 2020-11-30 RX ORDER — ATORVASTATIN CALCIUM 10 MG/1
TABLET, FILM COATED ORAL
Qty: 90 TABLET | Refills: 1 | Status: SHIPPED | OUTPATIENT
Start: 2020-11-30 | End: 2021-02-01

## 2020-11-30 RX ORDER — FUROSEMIDE 20 MG/1
TABLET ORAL
Qty: 90 TABLET | Refills: 1 | Status: SHIPPED | OUTPATIENT
Start: 2020-11-30 | End: 2021-02-01

## 2020-11-30 NOTE — TELEPHONE ENCOUNTER
Hypertension Medications Protocol Otldmv9811/23/2020 09:14 PM   CMP or BMP in past 12 months Protocol Details    Last serum creatinine< 2.0     Appointment in past 6 or next 3 months        Cholesterol Medication Protocol Dpyulw3011/23/2020 09:14 PM   ALT < 80

## 2020-12-20 DIAGNOSIS — Z79.01 MONITORING FOR LONG-TERM ANTICOAGULANT USE: ICD-10-CM

## 2020-12-20 DIAGNOSIS — Z51.81 MONITORING FOR LONG-TERM ANTICOAGULANT USE: ICD-10-CM

## 2020-12-20 DIAGNOSIS — I48.20 CHRONIC ATRIAL FIBRILLATION (HCC): ICD-10-CM

## 2020-12-21 RX ORDER — WARFARIN SODIUM 5 MG/1
TABLET ORAL
Qty: 90 TABLET | Refills: 0 | Status: SHIPPED | OUTPATIENT
Start: 2020-12-21 | End: 2020-12-24

## 2020-12-21 RX ORDER — WARFARIN SODIUM 2.5 MG/1
TABLET ORAL
Qty: 30 TABLET | Refills: 0 | Status: SHIPPED | OUTPATIENT
Start: 2020-12-21 | End: 2020-12-24

## 2021-02-01 DIAGNOSIS — I34.0 NON-RHEUMATIC MITRAL REGURGITATION: ICD-10-CM

## 2021-02-01 DIAGNOSIS — I48.20 CHRONIC ATRIAL FIBRILLATION (HCC): ICD-10-CM

## 2021-02-01 DIAGNOSIS — Z79.01 LONG TERM (CURRENT) USE OF ANTICOAGULANTS: ICD-10-CM

## 2021-02-01 RX ORDER — FUROSEMIDE 20 MG/1
20 TABLET ORAL DAILY
Qty: 90 TABLET | Refills: 1 | Status: SHIPPED | OUTPATIENT
Start: 2021-02-01 | End: 2021-06-10

## 2021-02-01 RX ORDER — ATORVASTATIN CALCIUM 10 MG/1
10 TABLET, FILM COATED ORAL DAILY
Qty: 90 TABLET | Refills: 1 | Status: SHIPPED | OUTPATIENT
Start: 2021-02-01 | End: 2021-06-10

## 2021-02-01 RX ORDER — DILTIAZEM HYDROCHLORIDE 240 MG/1
240 CAPSULE, COATED, EXTENDED RELEASE ORAL DAILY
Qty: 90 CAPSULE | Refills: 1 | Status: SHIPPED | OUTPATIENT
Start: 2021-02-01 | End: 2021-06-10

## 2021-02-01 RX ORDER — METOPROLOL TARTRATE 50 MG/1
50 TABLET, FILM COATED ORAL 2 TIMES DAILY
Qty: 180 TABLET | Refills: 1 | Status: SHIPPED | OUTPATIENT
Start: 2021-02-01 | End: 2021-06-10

## 2021-02-01 NOTE — TELEPHONE ENCOUNTER
Refill protocol passed because the patient met the following protocol for    Requested Prescriptions     Pending Prescriptions Disp Refills   • furosemide 20 MG Oral Tab 90 tablet 1     Sig: Take 1 tablet (20 mg total) by mouth daily.    • atorvastatin 10 M

## 2021-02-02 DIAGNOSIS — Z23 NEED FOR VACCINATION: ICD-10-CM

## 2021-02-06 RX ORDER — TAMSULOSIN HYDROCHLORIDE 0.4 MG/1
0.4 CAPSULE ORAL DAILY
Qty: 90 CAPSULE | Refills: 1 | Status: SHIPPED | OUTPATIENT
Start: 2021-02-06 | End: 2021-07-22

## 2021-02-06 NOTE — TELEPHONE ENCOUNTER
Requesting tamsulosin 0.4mg  LOV: 8/25 /20  RTC:   Last Relevant Labs:   Filled: 7/20/20  #90 with 1 refills    Future Appointments   Date Time Provider Arturo Matthews   7/28/2021  9:40 AM Fidel Pena MD [facility]

## 2021-02-26 ENCOUNTER — TELEPHONE (OUTPATIENT)
Dept: FAMILY MEDICINE CLINIC | Facility: CLINIC | Age: 76
End: 2021-02-26

## 2021-03-04 ENCOUNTER — TELEPHONE (OUTPATIENT)
Dept: FAMILY MEDICINE CLINIC | Facility: CLINIC | Age: 76
End: 2021-03-04

## 2021-03-04 DIAGNOSIS — Z12.5 SCREENING FOR MALIGNANT NEOPLASM OF PROSTATE: ICD-10-CM

## 2021-03-04 DIAGNOSIS — R73.03 PREDIABETES: ICD-10-CM

## 2021-03-04 DIAGNOSIS — E78.2 MIXED HYPERLIPIDEMIA: Primary | ICD-10-CM

## 2021-03-04 DIAGNOSIS — N18.30 STAGE 3 CHRONIC KIDNEY DISEASE, UNSPECIFIED WHETHER STAGE 3A OR 3B CKD (HCC): Chronic | ICD-10-CM

## 2021-03-04 NOTE — TELEPHONE ENCOUNTER
- Pt has scheduled Annual supervisit. Do you want him to complete labs before appt?     Future Appointments   Date Time Provider Arturo Cassie   4/21/2021  8:00 AM Aubrie Sultana MD [facility] [address]   7/28/2021  9:40 AM Michael Gates,

## 2021-04-21 ENCOUNTER — OFFICE VISIT (OUTPATIENT)
Dept: FAMILY MEDICINE CLINIC | Facility: CLINIC | Age: 76
End: 2021-04-21
Payer: MEDICARE

## 2021-04-21 VITALS
DIASTOLIC BLOOD PRESSURE: 70 MMHG | BODY MASS INDEX: 29.22 KG/M2 | OXYGEN SATURATION: 96 % | RESPIRATION RATE: 16 BRPM | HEART RATE: 98 BPM | HEIGHT: 66.5 IN | TEMPERATURE: 97 F | WEIGHT: 184 LBS | SYSTOLIC BLOOD PRESSURE: 110 MMHG

## 2021-04-21 DIAGNOSIS — D64.9 ANEMIA, UNSPECIFIED TYPE: ICD-10-CM

## 2021-04-21 DIAGNOSIS — Z51.81 MONITORING FOR LONG-TERM ANTICOAGULANT USE: ICD-10-CM

## 2021-04-21 DIAGNOSIS — J44.9 CHRONIC OBSTRUCTIVE PULMONARY DISEASE, UNSPECIFIED COPD TYPE (HCC): ICD-10-CM

## 2021-04-21 DIAGNOSIS — Z79.01 MONITORING FOR LONG-TERM ANTICOAGULANT USE: ICD-10-CM

## 2021-04-21 DIAGNOSIS — Z00.00 ENCOUNTER FOR ANNUAL HEALTH EXAMINATION: ICD-10-CM

## 2021-04-21 DIAGNOSIS — N40.1 BENIGN PROSTATIC HYPERPLASIA WITH URINARY HESITANCY: ICD-10-CM

## 2021-04-21 DIAGNOSIS — I48.21 PERMANENT ATRIAL FIBRILLATION (HCC): ICD-10-CM

## 2021-04-21 DIAGNOSIS — E78.2 MIXED HYPERLIPIDEMIA: ICD-10-CM

## 2021-04-21 DIAGNOSIS — R39.11 BENIGN PROSTATIC HYPERPLASIA WITH URINARY HESITANCY: ICD-10-CM

## 2021-04-21 DIAGNOSIS — Z00.00 ENCOUNTER FOR ANNUAL WELLNESS VISIT (AWV) IN MEDICARE PATIENT: Primary | ICD-10-CM

## 2021-04-21 DIAGNOSIS — I34.0 NONRHEUMATIC MITRAL VALVE REGURGITATION: ICD-10-CM

## 2021-04-21 DIAGNOSIS — I35.8 AORTIC VALVE SCLEROSIS: ICD-10-CM

## 2021-04-21 DIAGNOSIS — R73.03 PREDIABETES: ICD-10-CM

## 2021-04-21 DIAGNOSIS — I70.0 AORTIC ATHEROSCLEROSIS (HCC): ICD-10-CM

## 2021-04-21 DIAGNOSIS — I50.22 CHRONIC SYSTOLIC CONGESTIVE HEART FAILURE (HCC): ICD-10-CM

## 2021-04-21 PROBLEM — N18.30 CKD (CHRONIC KIDNEY DISEASE) STAGE 3, GFR 30-59 ML/MIN (HCC): Chronic | Status: RESOLVED | Noted: 2019-08-05 | Resolved: 2021-04-21

## 2021-04-21 PROCEDURE — 96160 PT-FOCUSED HLTH RISK ASSMT: CPT | Performed by: FAMILY MEDICINE

## 2021-04-21 PROCEDURE — 99397 PER PM REEVAL EST PAT 65+ YR: CPT | Performed by: FAMILY MEDICINE

## 2021-04-21 PROCEDURE — G0439 PPPS, SUBSEQ VISIT: HCPCS | Performed by: FAMILY MEDICINE

## 2021-04-21 PROCEDURE — 3078F DIAST BP <80 MM HG: CPT | Performed by: FAMILY MEDICINE

## 2021-04-21 PROCEDURE — 3008F BODY MASS INDEX DOCD: CPT | Performed by: FAMILY MEDICINE

## 2021-04-21 PROCEDURE — 3074F SYST BP LT 130 MM HG: CPT | Performed by: FAMILY MEDICINE

## 2021-04-21 NOTE — PATIENT INSTRUCTIONS
Thank you for choosing Brian Alfonso MD at [place] [address]  To Do: Yoel Ashby  1. Please see age appropriate health prevention below    [email or web address] is located in [address]. Monday, Tuesday & Friday – 8 a.m. to 4 p.m.   Wednesday, Thurs the benefits outweigh those potential risks and we strive to make you healthier and to improve your quality of life.     Referrals, and Radiology Information:    If your insurance requires a referral to a specialist, please allow 5 business days to process have ever smoked. Men in this age group who have never smoked could still be offered screening, depending on their family history or other risk factors they may have.  1-time ultrasound   Unhealthy alcohol use All men in this age group At routine exams   Bl this age group At least every 5 years   HIV Men at increased risk for infection At routine exams   Lung cancer Men ages 54 to 76 who are in fairly good health and are at higher risk for lung cancer  · Currently smoke or who have quite within the past 15 ye healthcare provider if you need a booster dose   Pneumococcal conjugate vaccine (PCV13) and pneumococcal polysaccharide vaccine (PPSV23) PPSV 23: All men in this age group who have not been vaccinated or have not had infection  PCV 13:  Men at risk for infe Please check with your insurance carrier before scheduling to verify coverage.     PREVENTATIVE SERVICES  INDICATIONS AND SCHEDULE Internal Lab or Procedure External Lab or Procedure   Diabetes Screening      HbgA1C     At Least  Annually for Diabetics HEMO this patient. Update Health Maintenance if applicable    Flex Sigmoidoscopy Screen  Covered every 5 years No results found for this or any previous visit. No flowsheet data found.      Fecal Occult Blood   Covered Annually No results found for: FOB, OCCULTS benefits, but Medicare does not cover unless Medically needed    Zoster (Not covered by Medicare Part B) No orders found for this or any previous visit.  This may be covered with your pharmacy  prescription benefits     Recommended Websites for Advanced Dir

## 2021-04-21 NOTE — PROGRESS NOTES
HPI:   Miguel Maier is a 66year old male who presents for a MA (Medicare Advantage) [facility] (Once per calendar year).     Mr. Codi Mayes is a pleasant 25-year-old gentleman with history of A. fib on anticoagulation with warfarin, COPD, hyperlipidemia, pre (GASTROENTEROLOGY)    Patient Active Problem List:     Chronic systolic congestive heart failure (HCC)     Mixed hyperlipidemia     Mitral regurgitation     Prediabetes     COPD (chronic obstructive pulmonary disease) ([facility] [address]     Monitoring for long-term ant ANTICOAGULATION CLINIC.  ascorbic acid (VITAMIN C) 500 MG Oral Tab, Take 500 mg by mouth daily. Cholecalciferol (VITAMIN D) 1000 UNITS Oral Cap, Take 1 Cap by mouth daily.        MEDICAL INFORMATION:   He  has a past medical history of Anemia (1/23/2017), 29.25 kg/m² as calculated from the following:    Height as of this encounter: 5' 6.5\" (1.689 m). Weight as of this encounter: 184 lb (83.5 kg). Medicare Hearing Assessment  (Required for AWV/SWV)    Hearing grossly normal bilaterally.            Visu failure ([address]  -chronic stable issue, continue present management with observation and medications as noted      Aortic valve sclerosis  -chronic stable issue, continue present management with observation and medications as noted      Aortic atherosclerosi daily physical activity?: Light  How would you describe your current health state?: Good  How do you maintain positive mental well-being?: Puzzles;Games    This section provided for quick review of chart, separate sheet to patient  PREVENTATIVE SERVICES  I renal disease   Hemophiliacs who received Factor VIII or IX concentrates   Clients of institutions for the mentally retarded   Persons who live in the same house as a HepB virus carrier   Homosexual men   Illicit injectable drug abusers     Tetanus Toxoid

## 2021-05-10 ENCOUNTER — HOSPITAL ENCOUNTER (OUTPATIENT)
Dept: GENERAL RADIOLOGY | Age: 76
Discharge: HOME | End: 2021-05-10
Attending: FAMILY MEDICINE
Payer: MEDICARE

## 2021-05-10 DIAGNOSIS — J44.9 CHRONIC OBSTRUCTIVE PULMONARY DISEASE, UNSPECIFIED COPD TYPE (HCC): ICD-10-CM

## 2021-05-10 PROCEDURE — 71046 X-RAY EXAM CHEST 2 VIEWS: CPT | Performed by: FAMILY MEDICINE

## 2021-05-10 NOTE — PROGRESS NOTES
Imaging report reviewed and shows no concerning findings.  Please notify patient.    -Dr. Rosaline Ma

## 2021-06-09 DIAGNOSIS — I48.20 CHRONIC ATRIAL FIBRILLATION (HCC): ICD-10-CM

## 2021-06-09 DIAGNOSIS — Z79.01 LONG TERM (CURRENT) USE OF ANTICOAGULANTS: ICD-10-CM

## 2021-06-09 DIAGNOSIS — I34.0 NON-RHEUMATIC MITRAL REGURGITATION: ICD-10-CM

## 2021-06-10 RX ORDER — DILTIAZEM HYDROCHLORIDE 240 MG/1
CAPSULE, COATED, EXTENDED RELEASE ORAL
Qty: 90 CAPSULE | Refills: 1 | Status: SHIPPED | OUTPATIENT
Start: 2021-06-10 | End: 2021-10-28

## 2021-06-10 RX ORDER — FUROSEMIDE 20 MG/1
TABLET ORAL
Qty: 90 TABLET | Refills: 1 | Status: SHIPPED | OUTPATIENT
Start: 2021-06-10 | End: 2021-10-28

## 2021-06-10 RX ORDER — METOPROLOL TARTRATE 50 MG/1
TABLET, FILM COATED ORAL
Qty: 180 TABLET | Refills: 1 | Status: SHIPPED | OUTPATIENT
Start: 2021-06-10 | End: 2021-10-28

## 2021-06-10 RX ORDER — ATORVASTATIN CALCIUM 10 MG/1
TABLET, FILM COATED ORAL
Qty: 90 TABLET | Refills: 1 | Status: SHIPPED | OUTPATIENT
Start: 2021-06-10 | End: 2021-10-28

## 2021-06-10 NOTE — TELEPHONE ENCOUNTER
Cholesterol Medication Protocol Uohbwu4706/09/2021 09:56 PM   ALT < 80 Protocol Details    ALT resulted within past year     Lipid panel within past 12 months     Appointment within past 12 or next 3 months      Hypertension Medications Protocol [id number]/09/ 03/02/2020     LOV 4/21/21   Lab orders pending

## 2021-07-08 ENCOUNTER — TELEPHONE (OUTPATIENT)
Dept: FAMILY MEDICINE CLINIC | Facility: CLINIC | Age: 76
End: 2021-07-08

## 2021-07-08 NOTE — TELEPHONE ENCOUNTER
Received notice from [address] [place] [address] that patient had received Covid vaccine dated 7/7/21

## 2021-07-22 RX ORDER — TAMSULOSIN HYDROCHLORIDE 0.4 MG/1
CAPSULE ORAL
Qty: 90 CAPSULE | Refills: 1 | Status: SHIPPED | OUTPATIENT
Start: 2021-07-22

## 2021-09-14 ENCOUNTER — TELEPHONE (OUTPATIENT)
Dept: FAMILY MEDICINE CLINIC | Facility: CLINIC | Age: 76
End: 2021-09-14

## 2021-10-28 DIAGNOSIS — I48.20 CHRONIC ATRIAL FIBRILLATION (HCC): ICD-10-CM

## 2021-10-28 DIAGNOSIS — Z79.01 LONG TERM (CURRENT) USE OF ANTICOAGULANTS: ICD-10-CM

## 2021-10-28 DIAGNOSIS — I34.0 NON-RHEUMATIC MITRAL REGURGITATION: ICD-10-CM

## 2021-10-28 RX ORDER — METOPROLOL TARTRATE 50 MG/1
TABLET, FILM COATED ORAL
Qty: 180 TABLET | Refills: 1 | Status: SHIPPED | OUTPATIENT
Start: 2021-10-28

## 2021-10-28 RX ORDER — ATORVASTATIN CALCIUM 10 MG/1
TABLET, FILM COATED ORAL
Qty: 90 TABLET | Refills: 1 | Status: SHIPPED | OUTPATIENT
Start: 2021-10-28

## 2021-10-28 RX ORDER — DILTIAZEM HYDROCHLORIDE 240 MG/1
CAPSULE, COATED, EXTENDED RELEASE ORAL
Qty: 90 CAPSULE | Refills: 1 | Status: SHIPPED | OUTPATIENT
Start: 2021-10-28

## 2021-10-28 RX ORDER — FUROSEMIDE 20 MG/1
TABLET ORAL
Qty: 90 TABLET | Refills: 1 | Status: SHIPPED | OUTPATIENT
Start: 2021-10-28

## 2021-11-03 ENCOUNTER — OFFICE VISIT (OUTPATIENT)
Dept: FAMILY MEDICINE CLINIC | Facility: CLINIC | Age: 76
End: 2021-11-03
Payer: MEDICARE

## 2021-11-03 VITALS
RESPIRATION RATE: 14 BRPM | BODY MASS INDEX: 28.34 KG/M2 | SYSTOLIC BLOOD PRESSURE: 116 MMHG | HEIGHT: 68 IN | WEIGHT: 187 LBS | TEMPERATURE: 97 F | DIASTOLIC BLOOD PRESSURE: 60 MMHG | HEART RATE: 66 BPM | OXYGEN SATURATION: 97 %

## 2021-11-03 DIAGNOSIS — I48.20 CHRONIC ATRIAL FIBRILLATION (HCC): ICD-10-CM

## 2021-11-03 DIAGNOSIS — I10 PRIMARY HYPERTENSION: ICD-10-CM

## 2021-11-03 DIAGNOSIS — E78.2 MIXED HYPERLIPIDEMIA: Primary | ICD-10-CM

## 2021-11-03 DIAGNOSIS — R73.03 PREDIABETES: ICD-10-CM

## 2021-11-03 PROCEDURE — 99214 OFFICE O/P EST MOD 30 MIN: CPT | Performed by: FAMILY MEDICINE

## 2021-11-03 PROCEDURE — 3008F BODY MASS INDEX DOCD: CPT | Performed by: FAMILY MEDICINE

## 2021-11-03 PROCEDURE — 3074F SYST BP LT 130 MM HG: CPT | Performed by: FAMILY MEDICINE

## 2021-11-03 PROCEDURE — 3078F DIAST BP <80 MM HG: CPT | Performed by: FAMILY MEDICINE

## 2021-11-03 NOTE — PATIENT INSTRUCTIONS
Thank you for choosing Valerio Neff MD at [place] [address]  To Do: Ashley Ramos  1. Please take meds as directed. [facility] is located in [address]. Monday, Tuesday & Friday – 8 a.m. to 4 p.m. Wednesday, Thursday – 7 a.m. to 3 p.m. those potential risks and we strive to make you healthier and to improve your quality of life.     Referrals, and Radiology Information:    If your insurance requires a referral to a specialist, please allow 5 business days to process your referral request. trans fats. Saturated fats raise your levels of cholesterol, so keep these fats to a minimum. They are found in foods such as fatty meats, whole milk, cheese, and palm and coconut oils.  Avoid trans fats because they lower good cholesterol as well as raise less fat. Good choices include fish, skinless chicken and turkey, and beans. Draining the fat from cooked ground meat is another way to reduce the amount of fat you eat. · Low-fat and nonfat dairy provide nutrients without a lot of fat.  Try low-fat or non This information is not intended as a substitute for professional medical care. Always follow your healthcare professional's instructions.

## 2021-11-03 NOTE — PROGRESS NOTES
Subjective:   Patient ID: Mary Hinton is a 66year old male.     HPI  Mr. Debbi Meneses is a pleasant 20-year-old gentleman with history of hypertension, A. fib on anticoagulation with warfarin, COPD, hyperlipidemia, prediabetes, BPH  here is today for his foll daily. Allergies:No Known Allergies    Objective:   Physical Exam    Constitutional: No distress. HENT:   Mouth/Throat: Oropharynx is clear and moist.   Eyes: Conjunctivae are normal. No scleral icterus. Neck: Neck supple. No thyromegaly present.

## 2022-01-26 ENCOUNTER — TELEPHONE (OUTPATIENT)
Dept: FAMILY MEDICINE CLINIC | Facility: CLINIC | Age: 77
End: 2022-01-26

## 2022-04-14 RX ORDER — TAMSULOSIN HYDROCHLORIDE 0.4 MG/1
CAPSULE ORAL
Qty: 90 CAPSULE | Refills: 1 | Status: SHIPPED | OUTPATIENT
Start: 2022-04-14

## 2022-04-22 NOTE — PATIENT INSTRUCTIONS
Thank you for choosing Noah Johnson MD at [place] [address]  To Do: Hieu Romeo  1. Please take probiotic twice a day for 2 weeks  [employer] is located in [address]. Monday, Tuesday & Friday – 8 a.m. to 4 p.m.   Wednesday, Thursday – 7 benefits outweigh those potential risks and we strive to make you healthier and to improve your quality of life.     Referrals, and Radiology Information:    If your insurance requires a referral to a specialist, please allow 5 business days to process your that lines the nose, sinuses, and throat. It warms and moistens the air you breathe in. It also makes the sticky mucus that helps clean the air of dust and other small particles.  The turbinates on each side of the nose are curved, bony ridges lined with mu No

## 2022-05-02 ENCOUNTER — OFFICE VISIT (OUTPATIENT)
Dept: FAMILY MEDICINE CLINIC | Facility: CLINIC | Age: 77
End: 2022-05-02
Payer: MEDICARE

## 2022-05-02 VITALS
SYSTOLIC BLOOD PRESSURE: 110 MMHG | WEIGHT: 188 LBS | BODY MASS INDEX: 28.49 KG/M2 | RESPIRATION RATE: 14 BRPM | OXYGEN SATURATION: 98 % | HEART RATE: 67 BPM | HEIGHT: 68 IN | TEMPERATURE: 97 F | DIASTOLIC BLOOD PRESSURE: 62 MMHG

## 2022-05-02 DIAGNOSIS — N40.1 BENIGN PROSTATIC HYPERPLASIA WITH URINARY HESITANCY: ICD-10-CM

## 2022-05-02 DIAGNOSIS — I50.22 CHRONIC SYSTOLIC CONGESTIVE HEART FAILURE (HCC): ICD-10-CM

## 2022-05-02 DIAGNOSIS — I35.8 AORTIC VALVE SCLEROSIS: ICD-10-CM

## 2022-05-02 DIAGNOSIS — E78.2 MIXED HYPERLIPIDEMIA: ICD-10-CM

## 2022-05-02 DIAGNOSIS — I34.0 NONRHEUMATIC MITRAL VALVE REGURGITATION: ICD-10-CM

## 2022-05-02 DIAGNOSIS — J44.9 CHRONIC OBSTRUCTIVE PULMONARY DISEASE, UNSPECIFIED COPD TYPE (HCC): ICD-10-CM

## 2022-05-02 DIAGNOSIS — R73.03 PREDIABETES: ICD-10-CM

## 2022-05-02 DIAGNOSIS — Z00.00 ENCOUNTER FOR ANNUAL HEALTH EXAMINATION: ICD-10-CM

## 2022-05-02 DIAGNOSIS — R39.11 BENIGN PROSTATIC HYPERPLASIA WITH URINARY HESITANCY: ICD-10-CM

## 2022-05-02 DIAGNOSIS — D64.9 ANEMIA, UNSPECIFIED TYPE: ICD-10-CM

## 2022-05-02 DIAGNOSIS — Z51.81 MONITORING FOR LONG-TERM ANTICOAGULANT USE: ICD-10-CM

## 2022-05-02 DIAGNOSIS — I48.21 PERMANENT ATRIAL FIBRILLATION (HCC): ICD-10-CM

## 2022-05-02 DIAGNOSIS — I70.0 AORTIC ATHEROSCLEROSIS (HCC): ICD-10-CM

## 2022-05-02 DIAGNOSIS — Z00.00 ENCOUNTER FOR ANNUAL WELLNESS EXAM IN MEDICARE PATIENT: Primary | ICD-10-CM

## 2022-05-02 DIAGNOSIS — I10 PRIMARY HYPERTENSION: ICD-10-CM

## 2022-05-02 DIAGNOSIS — Z79.01 MONITORING FOR LONG-TERM ANTICOAGULANT USE: ICD-10-CM

## 2022-05-02 PROCEDURE — 3078F DIAST BP <80 MM HG: CPT | Performed by: FAMILY MEDICINE

## 2022-05-02 PROCEDURE — 96160 PT-FOCUSED HLTH RISK ASSMT: CPT | Performed by: FAMILY MEDICINE

## 2022-05-02 PROCEDURE — 99397 PER PM REEVAL EST PAT 65+ YR: CPT | Performed by: FAMILY MEDICINE

## 2022-05-02 PROCEDURE — 3008F BODY MASS INDEX DOCD: CPT | Performed by: FAMILY MEDICINE

## 2022-05-02 PROCEDURE — G0439 PPPS, SUBSEQ VISIT: HCPCS | Performed by: FAMILY MEDICINE

## 2022-05-02 PROCEDURE — 3074F SYST BP LT 130 MM HG: CPT | Performed by: FAMILY MEDICINE

## 2022-06-15 DIAGNOSIS — I34.0 NON-RHEUMATIC MITRAL REGURGITATION: ICD-10-CM

## 2022-06-15 DIAGNOSIS — Z79.01 LONG TERM (CURRENT) USE OF ANTICOAGULANTS: ICD-10-CM

## 2022-06-15 DIAGNOSIS — I48.20 CHRONIC ATRIAL FIBRILLATION (HCC): ICD-10-CM

## 2022-06-16 RX ORDER — ATORVASTATIN CALCIUM 10 MG/1
TABLET, FILM COATED ORAL
Qty: 90 TABLET | Refills: 1 | Status: SHIPPED | OUTPATIENT
Start: 2022-06-16

## 2022-06-16 RX ORDER — DILTIAZEM HYDROCHLORIDE 240 MG/1
CAPSULE, COATED, EXTENDED RELEASE ORAL
Qty: 90 CAPSULE | Refills: 1 | Status: SHIPPED | OUTPATIENT
Start: 2022-06-16

## 2022-06-16 RX ORDER — METOPROLOL TARTRATE 50 MG/1
TABLET, FILM COATED ORAL
Qty: 180 TABLET | Refills: 1 | Status: SHIPPED | OUTPATIENT
Start: 2022-06-16

## 2022-06-16 RX ORDER — FUROSEMIDE 20 MG/1
TABLET ORAL
Qty: 90 TABLET | Refills: 1 | Status: SHIPPED | OUTPATIENT
Start: 2022-06-16

## 2022-08-13 LAB
ABSOLUTE BASOPHILS: 28 CELLS/UL (ref 0–200)
ABSOLUTE EOSINOPHILS: 168 CELLS/UL (ref 15–500)
ABSOLUTE LYMPHOCYTES: 1673 CELLS/UL (ref 850–3900)
ABSOLUTE MONOCYTES: 875 CELLS/UL (ref 200–950)
ABSOLUTE NEUTROPHILS: 4256 CELLS/UL (ref 1500–7800)
ALBUMIN/GLOBULIN RATIO: 1.5 (CALC) (ref 1–2.5)
ALBUMIN: 4.2 G/DL (ref 3.6–5.1)
ALKALINE PHOSPHATASE: 45 U/L (ref 35–144)
ALT: 14 U/L (ref 9–46)
AST: 20 U/L (ref 10–35)
BASOPHILS: 0.4 %
BILIRUBIN, TOTAL: 1.4 MG/DL (ref 0.2–1.2)
BUN: 17 MG/DL (ref 7–25)
CALCIUM: 9.5 MG/DL (ref 8.6–10.3)
CARBON DIOXIDE: 29 MMOL/L (ref 20–32)
CHLORIDE: 103 MMOL/L (ref 98–110)
CHOL/HDLC RATIO: 3.5 (CALC)
CHOLESTEROL, TOTAL: 149 MG/DL
CREATININE: 1.1 MG/DL (ref 0.7–1.28)
EGFR: 69 ML/MIN/1.73M2
EOSINOPHILS: 2.4 %
GLOBULIN: 2.8 G/DL (CALC) (ref 1.9–3.7)
GLUCOSE: 100 MG/DL (ref 65–139)
HDL CHOLESTEROL: 42 MG/DL
HEMATOCRIT: 44.8 % (ref 38.5–50)
HEMOGLOBIN: 15.1 G/DL (ref 13.2–17.1)
LDL-CHOLESTEROL: 82 MG/DL (CALC)
LYMPHOCYTES: 23.9 %
MCH: 30.8 PG (ref 27–33)
MCHC: 33.7 G/DL (ref 32–36)
MCV: 91.4 FL (ref 80–100)
MONOCYTES: 12.5 %
MPV: 10.5 FL (ref 7.5–12.5)
NEUTROPHILS: 60.8 %
NON-HDL CHOLESTEROL: 107 MG/DL (CALC)
PLATELET COUNT: 187 THOUSAND/UL (ref 140–400)
POTASSIUM: 4.8 MMOL/L (ref 3.5–5.3)
PROTEIN, TOTAL: 7 G/DL (ref 6.1–8.1)
RDW: 13 % (ref 11–15)
RED BLOOD CELL COUNT: 4.9 MILLION/UL (ref 4.2–5.8)
SODIUM: 139 MMOL/L (ref 135–146)
TRIGLYCERIDES: 158 MG/DL
TSH W/REFLEX TO FT4: 0.79 MIU/L (ref 0.4–4.5)
WHITE BLOOD CELL COUNT: 7 THOUSAND/UL (ref 3.8–10.8)

## 2022-09-07 RX ORDER — TAMSULOSIN HYDROCHLORIDE 0.4 MG/1
CAPSULE ORAL
Qty: 90 CAPSULE | Refills: 1 | Status: SHIPPED | OUTPATIENT
Start: 2022-09-07

## 2022-11-02 ENCOUNTER — OFFICE VISIT (OUTPATIENT)
Dept: FAMILY MEDICINE CLINIC | Facility: CLINIC | Age: 77
End: 2022-11-02
Payer: MEDICARE

## 2022-11-02 VITALS
DIASTOLIC BLOOD PRESSURE: 74 MMHG | WEIGHT: 190 LBS | RESPIRATION RATE: 16 BRPM | BODY MASS INDEX: 28.79 KG/M2 | HEIGHT: 68 IN | OXYGEN SATURATION: 98 % | HEART RATE: 65 BPM | TEMPERATURE: 98 F | SYSTOLIC BLOOD PRESSURE: 112 MMHG

## 2022-11-02 DIAGNOSIS — I10 PRIMARY HYPERTENSION: Primary | ICD-10-CM

## 2022-11-02 DIAGNOSIS — I48.20 CHRONIC ATRIAL FIBRILLATION (HCC): ICD-10-CM

## 2022-11-02 DIAGNOSIS — E78.2 MIXED HYPERLIPIDEMIA: ICD-10-CM

## 2022-11-02 PROCEDURE — 3078F DIAST BP <80 MM HG: CPT | Performed by: FAMILY MEDICINE

## 2022-11-02 PROCEDURE — 3074F SYST BP LT 130 MM HG: CPT | Performed by: FAMILY MEDICINE

## 2022-11-02 PROCEDURE — 3008F BODY MASS INDEX DOCD: CPT | Performed by: FAMILY MEDICINE

## 2022-11-02 PROCEDURE — 99214 OFFICE O/P EST MOD 30 MIN: CPT | Performed by: FAMILY MEDICINE

## 2022-11-02 NOTE — PATIENT INSTRUCTIONS
Thank you for choosing Muna Choudhary MD at [place] [id number]  To Do: Mitul Emma  1. High Blood pressure  What Is a Normal Blood Pressure? The Joint National Committee on Prevention, Detection, Evaluation, and Treatment of High Blood Pressure has classified blood pressure measurements into several categories:  Normal blood pressure is systolic pressure less than 448 and diastolic pressure less than 80 mmHg. \"Prehypertension\" is systolic pressure of 842-230 or diastolic pressure of 28-50 mmHg. Stage 1 Hypertension is blood pressure greater than systolic pressure of 399-658 or diastolic pressure of 49-89 mmHg or greater. Stage 2 Hypertension is systolic pressure of 437 or greater or diastolic pressure of 379 or greater. What Health Problems Are Associated With High Blood Pressure? Several potentially serious health conditions are linked to high blood pressure, including: Atherosclerosis: a disease of the arteries caused by a buildup of plaque, or fatty material, on the inside walls of the blood vessels; hypertension contributes to this buildup by putting added stress and force on the artery walls. Heart Disease: Heart failure (the heart is not strong enough to pump blood adequately), ischemic heart disease (the heart tissue doesn't get enough blood), and hypertensive hypertrophic cardiomyopathy (thickened, abnormally functioning heart muscle) are all associated with high blood pressure. Kidney Disease: Hypertension can damage the blood vessels and filters in the kidneys, so that the kidneys cannot excrete waste properly. Stroke: Hypertension can lead to stroke, either by contributing to the process of atherosclerosis (which can lead to blockages and/or clots), or by weakening the blood vessel wall and causing it to rupture. Eye Disease: Hypertension can damage the very small blood vessels in the retina.   Bleeding from the aorta, the large blood vessel that supplies blood to the abdomen, pelvis, and legs   Heart failure   Poor blood supply to the legs  Erectile Dysfunction  Problems with your vision    Overview  \"Blood pressure\" is the force of blood pushing against the walls of the arteries as the heart pumps blood. If this pressure rises and stays high over time, it can damage the body in many ways. About 1 in 3 adults in the [place] has HBP. The condition itself usually has no signs or symptoms. You can have it for years without knowing it. During this time, though, HBP can damage your heart, blood vessels, kidneys, and other parts of your body. Knowing your blood pressure numbers is important, even when you're feeling fine. If your blood pressure is normal, you can work with your health care team to keep it that way. If your blood pressure is too high, treatment may help prevent damage to your body's organs. By [facility] staff   DASH stands for Dietary Approaches to Stop Hypertension. The DASH diet is a lifelong approach to healthy eating that's designed to help treat or prevent high blood pressure (hypertension). The DASH diet encourages you to reduce the sodium in your diet and eat a variety of foods rich in nutrients that help lower blood pressure, such as potassium, calcium and magnesium. By following the DASH diet, you may be able to reduce your blood pressure by a few points in just two weeks. Over time, your blood pressure could drop by eight to 14 points, which can make a significant difference in your health risks. DASH DIET  The low-salt Dietary Approaches to Stop Hypertension (DASH) diet is proven to help lower blood pressure. Its effects on blood pressure are sometimes seen within a few weeks. This diet is not only rich in important nutrients and fiber, but it also includes foods that contain far more potassium (4,700 milligrams (mg)/day), calcium (1,250 mg/day), and magnesium (500 mg/day) and much less sodium (salt) than the typical American diet.   Limit sodium to no more than 2,300 mg a day (eating only 1,500 mg a day is an even better goal). Reduce saturated fat to no more than 6% of daily calories and total fat to 27% of daily calories. Low-fat dairy products appear to be especially beneficial for lowering systolic blood pressure. When choosing fats, select monounsaturated oils, such as olive or canola oils. Choose whole grains over white flour or pasta products. Choose fresh fruits and vegetables every day. Many of these foods are rich in potassium, fiber, or both. Eat nuts, seeds, or legumes (dried beans or peas) daily. Choose modest amounts of protein (no more than 18% of total daily calories). Fish, skinless poultry, and soy products are the best protein sources. Other daily nutrient goals in the DASH diet include limiting carbohydrates to 55% of daily calories and dietary cholesterol to 150 mg. Try to get at least 30 grams (g) of daily fiber. Grains (6 to 8 servings a day)  Grains include bread, cereal, rice and pasta. Examples of one serving of grains include 1 slice whole-wheat bread, 1 ounce (oz.) dry cereal, or 1/2 cup cooked cereal, rice or pasta. Focus on whole grains because they have more fiber and nutrients than do refined grains. For instance, use brown rice instead of white rice, whole-wheat pasta instead of regular pasta and whole-grain bread instead of white bread. Look for products labeled \"100 percent whole grain\" or \"100 percent whole wheat. \"   Grains are naturally low in fat, so avoid spreading on butter or adding cream and cheese sauces. Vegetables (4 to 5 servings a day)  Tomatoes, carrots, broccoli, sweet potatoes, greens and other vegetables are full of fiber, vitamins, and such minerals as potassium and magnesium. Examples of one serving include 1 cup raw leafy green vegetables or 1/2 cup cut-up raw or cooked vegetables.    Don't think of vegetables only as side dishes -- a hearty blend of vegetables served over brown rice or whole-wheat noodles can serve as the main dish for a meal.   Fresh or frozen vegetables are both good choices. When buying frozen and canned vegetables, choose those labeled as low sodium or without added salt. To increase the number of servings you fit in daily, be creative. In a stir-matthew, for instance, cut the amount of meat in half and double up on the vegetables. Fruits (4 to 5 servings a day)  Many fruits need little preparation to become a healthy part of a meal or snack. Like vegetables, they're packed with fiber, potassium and magnesium and are typically low in fat -- exceptions include avocados and coconuts. Examples of one serving include 1 medium fruit or 1/2 cup fresh, frozen or canned fruit. Have a piece of fruit with meals and one as a snack, then round out your day with a dessert of fresh fruits topped with a splash of low-fat yogurt. Leave on edible peels whenever possible. The peels of apples, pears and most fruits with pits add interesting texture to recipes and contain healthy nutrients and fiber. Remember that citrus fruits and juice, such as grapefruit, can interact with certain medications, so check with your doctor or pharmacist to see if they're OK for you. Dairy (2 to 3 servings a day)  Milk, yogurt, cheese and other dairy products are major sources of calcium, vitamin D and protein. But the key is to make sure that you choose dairy products that are low-fat or fat-free because otherwise they can be a major source of fat. Examples of one serving include 1 cup skim or 1% milk, 1 cup yogurt or 1 1/2 oz. cheese. Low-fat or fat-free frozen yogurt can help you boost the amount of dairy products you eat while offering a sweet treat. Add fruit for a healthy twist.   If you have trouble digesting dairy products, choose lactose-free products or consider taking an over-the-counter product that contains the enzyme lactase, which can reduce or prevent the symptoms of lactose intolerance. Go easy on regular and even fat-free cheeses because they are typically high in sodium. Lean meat, poultry and fish (6 or fewer servings a day)  Meat can be a rich source of protein, B vitamins, iron and zinc. But because even lean varieties contain fat and cholesterol, don't make them a mainstay of your diet -- cut back typical meat portions by one-third or one-half and pile on the vegetables instead. Examples of one serving include 1 oz. cooked skinless poultry, seafood or lean meat, 1 egg, or 1 oz. water-packed, no-salt-added canned tuna. Trim away skin and fat from meat and then broil, grill, roast or poach instead of frying. Eat heart-healthy fish, such as salmon, herring and tuna. These types of fish are high in omega-3 fatty acids, which can help lower your total cholesterol. Nuts, seeds and legumes (4 to 5 servings a week)   Almonds, sunflower seeds, kidney beans, peas, lentils and other foods in this family are good sources of magnesium, potassium and protein. They're also full of fiber and phytochemicals, which are plant compounds that may protect against some cancers and cardiovascular disease. Serving sizes are small and are intended to be consumed weekly because these foods are high in calories. Examples of one serving include 1/3 cup (1 1/2 oz.) nuts, 2 tablespoons seeds or 1/2 cup cooked beans or peas. Nuts sometimes get a bad rap because of their fat content, but they contain healthy types of fat -- monounsaturated fat and omega-3 fatty acids. They're high in calories, however, so eat them in moderation. Try adding them to stir-fries, salads or cereals. Soybean-based products, such as tofu and tempeh, can be a good alternative to meat because they contain all of the amino acids your body needs to make a complete protein, just like meat. They also contain isoflavones, a type of natural plant compound (phytochemical) that has been shown to have some health benefits.    Fats and oils (2 to 3 servings a day)  Fat helps your body absorb essential vitamins and helps your body's immune system. But too much fat increases your risk of heart disease, diabetes and obesity. The DASH diet strives for a healthy balance by providing 30 percent or less of daily calories from fat, with a focus on the healthier unsaturated fats. Examples of one serving include 1 teaspoon soft margarine, 1 tablespoon low-fat mayonnaise or 2 tablespoons light salad dressing. Saturated fat and trans fat are the main dietary culprits in raising your blood cholesterol and increasing your risk of coronary artery disease. DASH helps keep your daily saturated fat to less than 10 percent of your total calories by limiting use of meat, butter, cheese, whole milk, cream and eggs in your diet, along with foods made from lard, solid shortenings, and palm and coconut oils. Avoid trans fat, commonly found in such processed foods as crackers, baked goods and fried items. Read food labels on margarine and salad dressing so that you can choose those that are lowest in saturated fat and free of trans fat. Sweets (5 or fewer a week)  You don't have to banish sweets entirely while following the DASH diet -- just go easy on them. Examples of one serving include 1 tablespoon sugar, jelly or jam, 1/2 cup sorbet or 1 cup (8 oz.) lemonade. When you eat sweets, choose those that are fat-free or low-fat, such as sorbets, fruit ices, jelly beans, hard candy, jarad crackers or low-fat cookies. Artificial sweeteners such as aspartame (NutraSweet, Equal) and sucralose (Splenda) may help satisfy your sweet tooth while sparing the sugar. But remember that you still must use them sensibly. It's OK to swap a diet cola for a regular cola, but not in place of a more nutritious beverage such as low-fat milk or even plain water. Cut back on added sugar, which has no nutritional value but can pack on calories.    DASH diet: Alcohol and caffeine  Drinking too much alcohol can increase blood pressure. The DASH diet recommends that men limit alcohol to two or fewer drinks a day and women one or less. The DASH diet doesn't address caffeine consumption. The influence of caffeine on blood pressure remains unclear. But caffeine can cause your blood pressure to rise at least temporarily. If you already have high blood pressure or if you think caffeine is affecting your blood pressure, talk to your doctor about your caffeine consumption. The DASH diet is not designed to promote weight loss, but it can be used as part of an overall weight-loss strategy. The DASH diet is based on a diet of about 2,000 calories a day. If you're trying to lose weight, though, you may want to eat around 1,600 a day. You may need to adjust your serving goals based on your health or individual circumstances -- something your health care team can help you decide. Tips to cut back on sodium  The foods at the core of the DASH diet are naturally low in sodium. So just by following the DASH diet, you're likely to reduce your sodium intake. You also can cut back on sodium in your diet by:   Using sodium-free spices or flavorings with your food instead of salt   Not adding salt when cooking rice, pasta or hot cereal   Rinsing canned foods to remove some of the sodium   Buying foods labeled \"no salt added,\" \"sodium-free,\" \"low sodium\" or \"very low sodium\"   One teaspoon of table salt has about 2,300 mg of sodium, and 2/3 teaspoon of table salt has about 1,500 mg of sodium. When you read food labels, you may be surprised at just how much sodium some processed foods contain. Even low-fat soups, canned vegetables, ready-to-eat cereals and sliced turkey from the local deli -- all foods you may have considered healthy -- often have lots of sodium. You may not notice a difference in taste when you choose low-sodium food and beverages.  If things seem too bland, gradually introduce low-sodium foods and cut back on table salt until you reach your sodium goal. That'll give your palate time to adjust. It can take several weeks for your taste buds to get used to less salty foods. [facility] is located in [address]. Monday, Tuesday & Friday - 8 a.m. to 4 p.m. Wednesday, Thursday - 7 a.m. to 3 p.m. The lab is closed daily from 12 p.m.-12:30 p.m. Saturday lab hours by appointment. Call [phone] to schedule the appointment. Please signup for [facility], which is electronic access to your record if you have not done so. All your results will post on there. [email or web address]   You can NOW use [facility] at [facility] to book your appointments with us, or consider using open access scheduling which is through the edward website [email or web address] and type in Joe Garcia MD and follow the links for \"Schedule Online Now\"    To schedule Imaging or tests at [facility] Scheduling [phone], Go to [facility] A ER Building (For example: CT scans, X rays, Ultrasound, MRI)  Cardiac Testing in ER building Building A second floor Cardiac Testing [phone] (For example: Holter Monitor, Cardiac Stress tests,Event Monitor, or 2D Echocardiograms)  [facility] call [phone] usually in [facility] A  Walk in Clinic in [place] at [facility]. [address] Mon-Fri at 8am-7:30 p.m., and Sat/Sun 9:00a. m.-4:30 p.m. Also at [address]  Call [phone] for info     Please call our office about any questions regarding your treatment/medicines/tests as a result of today's visit. For your safety, read the entire package insert of all medicines prescribed to you and be aware of all of the risks of treatment even beyond those discussed today. All therapies have potential risk of harm or side effects or medication interactions.   It is your duty and for your safety to discuss with the pharmacist and our office with questions, and to notify us and stop treatment if problems arise, but know that our intention is that the benefits outweigh those potential risks and we strive to make you healthier and to improve your quality of life. Referrals, and Radiology Information:    If your insurance requires a referral to a specialist, please allow 5 business days to process your referral request.    If Byron Helm MD orders a CT or MRI, it may take up to 10 business days to receive approval from your insurance company. Once our office has called informing you that the insurance company approved your testing, please call Central Scheduling at [phone]  Please allow our office 5 business days to contact you regarding any testing results. Refill policies:   Allow 3 business days for refills; controlled substances may take longer and must be picked up from the office in person. Narcotic medications can only be filled in 30 day increments and must be refilled at an office visit only. If your prescription is due for a refill, you may be due for a follow-up appointment. We cannot refill your maintenance medications at a preventative wellness visit. To best provide you care, patients receiving maintenance medications need to be seen at least twice a year.

## 2023-02-06 DIAGNOSIS — I48.20 CHRONIC ATRIAL FIBRILLATION (HCC): ICD-10-CM

## 2023-02-06 DIAGNOSIS — I34.0 NON-RHEUMATIC MITRAL REGURGITATION: ICD-10-CM

## 2023-02-06 DIAGNOSIS — Z79.01 LONG TERM (CURRENT) USE OF ANTICOAGULANTS: ICD-10-CM

## 2023-02-06 RX ORDER — METOPROLOL TARTRATE 50 MG/1
TABLET, FILM COATED ORAL
Qty: 180 TABLET | Refills: 1 | Status: SHIPPED | OUTPATIENT
Start: 2023-02-06

## 2023-02-06 RX ORDER — FUROSEMIDE 20 MG/1
TABLET ORAL
Qty: 90 TABLET | Refills: 1 | Status: SHIPPED | OUTPATIENT
Start: 2023-02-06

## 2023-02-06 RX ORDER — DILTIAZEM HYDROCHLORIDE 240 MG/1
CAPSULE, COATED, EXTENDED RELEASE ORAL
Qty: 90 CAPSULE | Refills: 1 | Status: SHIPPED | OUTPATIENT
Start: 2023-02-06

## 2023-02-06 RX ORDER — ATORVASTATIN CALCIUM 10 MG/1
TABLET, FILM COATED ORAL
Qty: 90 TABLET | Refills: 1 | Status: SHIPPED | OUTPATIENT
Start: 2023-02-06

## 2023-04-24 RX ORDER — TAMSULOSIN HYDROCHLORIDE 0.4 MG/1
CAPSULE ORAL
Qty: 90 CAPSULE | Refills: 1 | Status: SHIPPED | OUTPATIENT
Start: 2023-04-24

## 2023-05-02 ENCOUNTER — OFFICE VISIT (OUTPATIENT)
Dept: FAMILY MEDICINE CLINIC | Facility: CLINIC | Age: 78
End: 2023-05-02
Payer: MEDICARE

## 2023-05-02 VITALS
TEMPERATURE: 98 F | HEIGHT: 68 IN | BODY MASS INDEX: 29.1 KG/M2 | HEART RATE: 104 BPM | RESPIRATION RATE: 16 BRPM | WEIGHT: 192 LBS | DIASTOLIC BLOOD PRESSURE: 72 MMHG | OXYGEN SATURATION: 99 % | SYSTOLIC BLOOD PRESSURE: 108 MMHG

## 2023-05-02 DIAGNOSIS — I35.8 AORTIC VALVE SCLEROSIS: ICD-10-CM

## 2023-05-02 DIAGNOSIS — I48.21 PERMANENT ATRIAL FIBRILLATION (HCC): ICD-10-CM

## 2023-05-02 DIAGNOSIS — Z00.00 ENCOUNTER FOR ANNUAL HEALTH EXAMINATION: ICD-10-CM

## 2023-05-02 DIAGNOSIS — N40.1 BENIGN PROSTATIC HYPERPLASIA WITH URINARY HESITANCY: ICD-10-CM

## 2023-05-02 DIAGNOSIS — R73.03 PREDIABETES: ICD-10-CM

## 2023-05-02 DIAGNOSIS — I70.0 AORTIC ATHEROSCLEROSIS (HCC): ICD-10-CM

## 2023-05-02 DIAGNOSIS — E78.2 MIXED HYPERLIPIDEMIA: ICD-10-CM

## 2023-05-02 DIAGNOSIS — I34.0 NONRHEUMATIC MITRAL VALVE REGURGITATION: ICD-10-CM

## 2023-05-02 DIAGNOSIS — J44.9 CHRONIC OBSTRUCTIVE PULMONARY DISEASE, UNSPECIFIED COPD TYPE (HCC): ICD-10-CM

## 2023-05-02 DIAGNOSIS — I50.22 CHRONIC SYSTOLIC CONGESTIVE HEART FAILURE (HCC): ICD-10-CM

## 2023-05-02 DIAGNOSIS — I10 PRIMARY HYPERTENSION: ICD-10-CM

## 2023-05-02 DIAGNOSIS — Z51.81 MONITORING FOR LONG-TERM ANTICOAGULANT USE: ICD-10-CM

## 2023-05-02 DIAGNOSIS — Z79.01 MONITORING FOR LONG-TERM ANTICOAGULANT USE: ICD-10-CM

## 2023-05-02 DIAGNOSIS — R39.11 BENIGN PROSTATIC HYPERPLASIA WITH URINARY HESITANCY: ICD-10-CM

## 2023-05-02 DIAGNOSIS — Z00.00 ENCOUNTER FOR ANNUAL WELLNESS EXAM IN MEDICARE PATIENT: Primary | ICD-10-CM

## 2023-05-02 PROCEDURE — 3078F DIAST BP <80 MM HG: CPT | Performed by: FAMILY MEDICINE

## 2023-05-02 PROCEDURE — 3008F BODY MASS INDEX DOCD: CPT | Performed by: FAMILY MEDICINE

## 2023-05-02 PROCEDURE — 3074F SYST BP LT 130 MM HG: CPT | Performed by: FAMILY MEDICINE

## 2023-05-02 PROCEDURE — 96160 PT-FOCUSED HLTH RISK ASSMT: CPT | Performed by: FAMILY MEDICINE

## 2023-05-02 PROCEDURE — G0439 PPPS, SUBSEQ VISIT: HCPCS | Performed by: FAMILY MEDICINE

## 2023-05-02 PROCEDURE — 1125F AMNT PAIN NOTED PAIN PRSNT: CPT | Performed by: FAMILY MEDICINE

## 2023-09-26 DIAGNOSIS — Z79.01 LONG TERM (CURRENT) USE OF ANTICOAGULANTS: ICD-10-CM

## 2023-09-26 DIAGNOSIS — I48.20 CHRONIC ATRIAL FIBRILLATION (HCC): ICD-10-CM

## 2023-09-26 DIAGNOSIS — I34.0 NON-RHEUMATIC MITRAL REGURGITATION: ICD-10-CM

## 2023-09-26 RX ORDER — DILTIAZEM HYDROCHLORIDE 240 MG/1
240 CAPSULE, COATED, EXTENDED RELEASE ORAL DAILY
Qty: 90 CAPSULE | Refills: 1 | Status: SHIPPED | OUTPATIENT
Start: 2023-09-26

## 2023-09-26 RX ORDER — METOPROLOL TARTRATE 50 MG/1
50 TABLET, FILM COATED ORAL 2 TIMES DAILY
Qty: 180 TABLET | Refills: 1 | Status: SHIPPED | OUTPATIENT
Start: 2023-09-26

## 2023-09-26 RX ORDER — FUROSEMIDE 20 MG/1
20 TABLET ORAL DAILY
Qty: 90 TABLET | Refills: 1 | Status: SHIPPED | OUTPATIENT
Start: 2023-09-26

## 2023-09-26 RX ORDER — ATORVASTATIN CALCIUM 10 MG/1
10 TABLET, FILM COATED ORAL DAILY
Qty: 90 TABLET | Refills: 1 | Status: SHIPPED | OUTPATIENT
Start: 2023-09-26

## 2023-11-02 ENCOUNTER — OFFICE VISIT (OUTPATIENT)
Dept: FAMILY MEDICINE CLINIC | Facility: CLINIC | Age: 78
End: 2023-11-02
Payer: MEDICARE

## 2023-11-02 VITALS
HEIGHT: 68 IN | WEIGHT: 191 LBS | OXYGEN SATURATION: 98 % | TEMPERATURE: 98 F | SYSTOLIC BLOOD PRESSURE: 112 MMHG | HEART RATE: 74 BPM | RESPIRATION RATE: 16 BRPM | DIASTOLIC BLOOD PRESSURE: 70 MMHG | BODY MASS INDEX: 28.95 KG/M2

## 2023-11-02 DIAGNOSIS — I48.20 CHRONIC ATRIAL FIBRILLATION (HCC): ICD-10-CM

## 2023-11-02 DIAGNOSIS — I10 PRIMARY HYPERTENSION: Primary | ICD-10-CM

## 2023-11-02 DIAGNOSIS — E78.2 MIXED HYPERLIPIDEMIA: ICD-10-CM

## 2023-11-02 PROCEDURE — 3074F SYST BP LT 130 MM HG: CPT | Performed by: FAMILY MEDICINE

## 2023-11-02 PROCEDURE — 99214 OFFICE O/P EST MOD 30 MIN: CPT | Performed by: FAMILY MEDICINE

## 2023-11-02 PROCEDURE — 3008F BODY MASS INDEX DOCD: CPT | Performed by: FAMILY MEDICINE

## 2023-11-02 PROCEDURE — 3078F DIAST BP <80 MM HG: CPT | Performed by: FAMILY MEDICINE

## 2023-11-02 NOTE — PROGRESS NOTES
Subjective:   Patient ID: Js Kumar is a 76year old male. HPI  Mr. Marilin Onofre is a pleasant 24-year-old gentleman with history of hypertension, A. fib on anticoagulation with warfarin, COPD, hyperlipidemia, prediabetes, BPH  here for his wellness . He takes his medications compliantly and does not report side effects. He does not have any questions or concerns. He does not report fever, cough, chest pain, shortness of breath, palpitations, nausea, vomiting, abdominal pain. I had reviewed past medical and family histories together with allergy and medication lists documented. History/Other:   Review of Systems  Constitutional: Negative for chills, fatigue and fever. HENT: Negative for trouble swallowing. Respiratory: Negative for shortness of breath. Cardiovascular: Negative for chest pain, palpitations and leg swelling. Gastrointestinal: Negative for abdominal pain, constipation, diarrhea, nausea and vomiting. Neurological: Negative for dizziness, weakness, light-headedness and headaches     Current Outpatient Medications   Medication Sig Dispense Refill    furosemide 20 MG Oral Tab Take 1 tablet (20 mg total) by mouth daily. 90 tablet 1    metoprolol tartrate 50 MG Oral Tab Take 1 tablet (50 mg total) by mouth 2 (two) times daily. 180 tablet 1    atorvastatin 10 MG Oral Tab Take 1 tablet (10 mg total) by mouth daily. 90 tablet 1    dilTIAZem  MG Oral Capsule SR 24 Hr Take 1 capsule (240 mg total) by mouth daily. 90 capsule 1    TAMSULOSIN 0.4 MG Oral Cap TAKE 1 CAPSULE EVERY DAY 90 capsule 1    warfarin 2.5 MG Oral Tab TAKE ONE TABLET BY MOUTH 2 DAYS WEEKLY OR AS DIRECTED BY ANTICOAGULATION CLINIC. 30 tablet 3    warfarin 5 MG Oral Tab TAKE ONE TABLET BY MOUTH 5 DAYS WEEKLY OR AS DIRECTED BY ANTICOAGULATION CLINIC. 65 tablet 3    ascorbic acid (VITAMIN C) 500 MG Oral Tab Take 1 tablet (500 mg total) by mouth daily.       Cholecalciferol (VITAMIN D) 1000 UNITS Oral Cap Take 1 Cap by mouth daily. Allergies:No Known Allergies    Objective:   Physical Exam  Vital signs reviewed   Constitutional: No distress. HENT:   Mouth/Throat: Oropharynx is clear and moist.   Eyes: Conjunctivae are normal. No scleral icterus. Neck: Neck supple. No thyromegaly present. Cardiovascular: Normal rate and normal heart sounds. An irregular rhythm present. No murmur heard. Pulmonary/Chest: Effort normal and breath sounds normal. No respiratory distress. He has no wheezes. He has no rales. Abdominal: Soft. Bowel sounds are normal. He exhibits no distension and no mass. There is no tenderness. Musculoskeletal: He exhibits no edema. Lymphadenopathy:     He has no cervical adenopathy. Neurological: He is alert. Assessment & Plan:   Primary hypertension  (primary encounter diagnosis)  -chronic stable issue, continue present management with observation and medications as noted    Mixed hyperlipidemia  -chronic stable issue, continue present management with observation and medications as noted    Chronic atrial fibrillation ([facility] [address]  -chronic stable issue, continue present management with observation and medications as noted      Follow-up in 6 months. Laboratory tests have been ordered for him previously and he will be doing this soon for me to review and will provide more recommendations once we get test results. This note was prepared using [employer] [facility] [employer] voice recognition dictation software. As a result errors may occur. When identified these errors have been corrected. While every attempt is made to correct errors during dictation discrepancies may still exist          No orders of the defined types were placed in this encounter.       Meds This Visit:  Requested Prescriptions      No prescriptions requested or ordered in this encounter       Imaging & Referrals:  None

## 2023-11-02 NOTE — PATIENT INSTRUCTIONS
Thank you for choosing Oracio Hernández MD at [place] [address]  To Do: Saman Holm  1. High Blood pressure  What Is a Normal Blood Pressure? The Joint National Committee on Prevention, Detection, Evaluation, and Treatment of High Blood Pressure has classified blood pressure measurements into several categories:  Normal blood pressure is systolic pressure less than 277 and diastolic pressure less than 80 mmHg. \"Prehypertension\" is systolic pressure of 876-340 or diastolic pressure of 22-29 mmHg. Stage 1 Hypertension is blood pressure greater than systolic pressure of 196-913 or diastolic pressure of 16-55 mmHg or greater. Stage 2 Hypertension is systolic pressure of 303 or greater or diastolic pressure of 992 or greater. What Health Problems Are Associated With High Blood Pressure? Several potentially serious health conditions are linked to high blood pressure, including: Atherosclerosis: a disease of the arteries caused by a buildup of plaque, or fatty material, on the inside walls of the blood vessels; hypertension contributes to this buildup by putting added stress and force on the artery walls. Heart Disease: Heart failure (the heart is not strong enough to pump blood adequately), ischemic heart disease (the heart tissue doesn't get enough blood), and hypertensive hypertrophic cardiomyopathy (thickened, abnormally functioning heart muscle) are all associated with high blood pressure. Kidney Disease: Hypertension can damage the blood vessels and filters in the kidneys, so that the kidneys cannot excrete waste properly. Stroke: Hypertension can lead to stroke, either by contributing to the process of atherosclerosis (which can lead to blockages and/or clots), or by weakening the blood vessel wall and causing it to rupture. Eye Disease: Hypertension can damage the very small blood vessels in the retina.   Bleeding from the aorta, the large blood vessel that supplies blood to the abdomen, pelvis, and legs   Heart failure   Poor blood supply to the legs  Erectile Dysfunction  Problems with your vision    Overview  \"Blood pressure\" is the force of blood pushing against the walls of the arteries as the heart pumps blood. If this pressure rises and stays high over time, it can damage the body in many ways. About 1 in 3 adults in the [place] has HBP. The condition itself usually has no signs or symptoms. You can have it for years without knowing it. During this time, though, HBP can damage your heart, blood vessels, kidneys, and other parts of your body. Knowing your blood pressure numbers is important, even when you're feeling fine. If your blood pressure is normal, you can work with your health care team to keep it that way. If your blood pressure is too high, treatment may help prevent damage to your body's organs. By [facility] staff   DASH stands for Dietary Approaches to Stop Hypertension. The DASH diet is a lifelong approach to healthy eating that's designed to help treat or prevent high blood pressure (hypertension). The DASH diet encourages you to reduce the sodium in your diet and eat a variety of foods rich in nutrients that help lower blood pressure, such as potassium, calcium and magnesium. By following the DASH diet, you may be able to reduce your blood pressure by a few points in just two weeks. Over time, your blood pressure could drop by eight to 14 points, which can make a significant difference in your health risks. DASH DIET  The low-salt Dietary Approaches to Stop Hypertension (DASH) diet is proven to help lower blood pressure. Its effects on blood pressure are sometimes seen within a few weeks. This diet is not only rich in important nutrients and fiber, but it also includes foods that contain far more potassium (4,700 milligrams (mg)/day), calcium (1,250 mg/day), and magnesium (500 mg/day) and much less sodium (salt) than the typical American diet.   Limit sodium to no more than 2,300 mg a day (eating only 1,500 mg a day is an even better goal). Reduce saturated fat to no more than 6% of daily calories and total fat to 27% of daily calories. Low-fat dairy products appear to be especially beneficial for lowering systolic blood pressure. When choosing fats, select monounsaturated oils, such as olive or canola oils. Choose whole grains over white flour or pasta products. Choose fresh fruits and vegetables every day. Many of these foods are rich in potassium, fiber, or both. Eat nuts, seeds, or legumes (dried beans or peas) daily. Choose modest amounts of protein (no more than 18% of total daily calories). Fish, skinless poultry, and soy products are the best protein sources. Other daily nutrient goals in the DASH diet include limiting carbohydrates to 55% of daily calories and dietary cholesterol to 150 mg. Try to get at least 30 grams (g) of daily fiber. Grains (6 to 8 servings a day)  Grains include bread, cereal, rice and pasta. Examples of one serving of grains include 1 slice whole-wheat bread, 1 ounce (oz.) dry cereal, or 1/2 cup cooked cereal, rice or pasta. Focus on whole grains because they have more fiber and nutrients than do refined grains. For instance, use brown rice instead of white rice, whole-wheat pasta instead of regular pasta and whole-grain bread instead of white bread. Look for products labeled \"100 percent whole grain\" or \"100 percent whole wheat. \"   Grains are naturally low in fat, so avoid spreading on butter or adding cream and cheese sauces. Vegetables (4 to 5 servings a day)  Tomatoes, carrots, broccoli, sweet potatoes, greens and other vegetables are full of fiber, vitamins, and such minerals as potassium and magnesium. Examples of one serving include 1 cup raw leafy green vegetables or 1/2 cup cut-up raw or cooked vegetables.    Don't think of vegetables only as side dishes -- a hearty blend of vegetables served over brown rice or whole-wheat noodles can serve as the main dish for a meal.   Fresh or frozen vegetables are both good choices. When buying frozen and canned vegetables, choose those labeled as low sodium or without added salt. To increase the number of servings you fit in daily, be creative. In a stir-matthew, for instance, cut the amount of meat in half and double up on the vegetables. Fruits (4 to 5 servings a day)  Many fruits need little preparation to become a healthy part of a meal or snack. Like vegetables, they're packed with fiber, potassium and magnesium and are typically low in fat -- exceptions include avocados and coconuts. Examples of one serving include 1 medium fruit or 1/2 cup fresh, frozen or canned fruit. Have a piece of fruit with meals and one as a snack, then round out your day with a dessert of fresh fruits topped with a splash of low-fat yogurt. Leave on edible peels whenever possible. The peels of apples, pears and most fruits with pits add interesting texture to recipes and contain healthy nutrients and fiber. Remember that citrus fruits and juice, such as grapefruit, can interact with certain medications, so check with your doctor or pharmacist to see if they're OK for you. Dairy (2 to 3 servings a day)  Milk, yogurt, cheese and other dairy products are major sources of calcium, vitamin D and protein. But the key is to make sure that you choose dairy products that are low-fat or fat-free because otherwise they can be a major source of fat. Examples of one serving include 1 cup skim or 1% milk, 1 cup yogurt or 1 1/2 oz. cheese. Low-fat or fat-free frozen yogurt can help you boost the amount of dairy products you eat while offering a sweet treat. Add fruit for a healthy twist.   If you have trouble digesting dairy products, choose lactose-free products or consider taking an over-the-counter product that contains the enzyme lactase, which can reduce or prevent the symptoms of lactose intolerance. Go easy on regular and even fat-free cheeses because they are typically high in sodium. Lean meat, poultry and fish (6 or fewer servings a day)  Meat can be a rich source of protein, B vitamins, iron and zinc. But because even lean varieties contain fat and cholesterol, don't make them a mainstay of your diet -- cut back typical meat portions by one-third or one-half and pile on the vegetables instead. Examples of one serving include 1 oz. cooked skinless poultry, seafood or lean meat, 1 egg, or 1 oz. water-packed, no-salt-added canned tuna. Trim away skin and fat from meat and then broil, grill, roast or poach instead of frying. Eat heart-healthy fish, such as salmon, herring and tuna. These types of fish are high in omega-3 fatty acids, which can help lower your total cholesterol. Nuts, seeds and legumes (4 to 5 servings a week)   Almonds, sunflower seeds, kidney beans, peas, lentils and other foods in this family are good sources of magnesium, potassium and protein. They're also full of fiber and phytochemicals, which are plant compounds that may protect against some cancers and cardiovascular disease. Serving sizes are small and are intended to be consumed weekly because these foods are high in calories. Examples of one serving include 1/3 cup (1 1/2 oz.) nuts, 2 tablespoons seeds or 1/2 cup cooked beans or peas. Nuts sometimes get a bad rap because of their fat content, but they contain healthy types of fat -- monounsaturated fat and omega-3 fatty acids. They're high in calories, however, so eat them in moderation. Try adding them to stir-fries, salads or cereals. Soybean-based products, such as tofu and tempeh, can be a good alternative to meat because they contain all of the amino acids your body needs to make a complete protein, just like meat. They also contain isoflavones, a type of natural plant compound (phytochemical) that has been shown to have some health benefits.    Fats and oils (2 to 3 servings a day)  Fat helps your body absorb essential vitamins and helps your body's immune system. But too much fat increases your risk of heart disease, diabetes and obesity. The DASH diet strives for a healthy balance by providing 30 percent or less of daily calories from fat, with a focus on the healthier unsaturated fats. Examples of one serving include 1 teaspoon soft margarine, 1 tablespoon low-fat mayonnaise or 2 tablespoons light salad dressing. Saturated fat and trans fat are the main dietary culprits in raising your blood cholesterol and increasing your risk of coronary artery disease. DASH helps keep your daily saturated fat to less than 10 percent of your total calories by limiting use of meat, butter, cheese, whole milk, cream and eggs in your diet, along with foods made from lard, solid shortenings, and palm and coconut oils. Avoid trans fat, commonly found in such processed foods as crackers, baked goods and fried items. Read food labels on margarine and salad dressing so that you can choose those that are lowest in saturated fat and free of trans fat. Sweets (5 or fewer a week)  You don't have to banish sweets entirely while following the DASH diet -- just go easy on them. Examples of one serving include 1 tablespoon sugar, jelly or jam, 1/2 cup sorbet or 1 cup (8 oz.) lemonade. When you eat sweets, choose those that are fat-free or low-fat, such as sorbets, fruit ices, jelly beans, hard candy, jarad crackers or low-fat cookies. Artificial sweeteners such as aspartame (NutraSweet, Equal) and sucralose (Splenda) may help satisfy your sweet tooth while sparing the sugar. But remember that you still must use them sensibly. It's OK to swap a diet cola for a regular cola, but not in place of a more nutritious beverage such as low-fat milk or even plain water. Cut back on added sugar, which has no nutritional value but can pack on calories.    DASH diet: Alcohol and caffeine  Drinking too much alcohol can increase blood pressure. The DASH diet recommends that men limit alcohol to two or fewer drinks a day and women one or less. The DASH diet doesn't address caffeine consumption. The influence of caffeine on blood pressure remains unclear. But caffeine can cause your blood pressure to rise at least temporarily. If you already have high blood pressure or if you think caffeine is affecting your blood pressure, talk to your doctor about your caffeine consumption. The DASH diet is not designed to promote weight loss, but it can be used as part of an overall weight-loss strategy. The DASH diet is based on a diet of about 2,000 calories a day. If you're trying to lose weight, though, you may want to eat around 1,600 a day. You may need to adjust your serving goals based on your health or individual circumstances -- something your health care team can help you decide. Tips to cut back on sodium  The foods at the core of the DASH diet are naturally low in sodium. So just by following the DASH diet, you're likely to reduce your sodium intake. You also can cut back on sodium in your diet by:   Using sodium-free spices or flavorings with your food instead of salt   Not adding salt when cooking rice, pasta or hot cereal   Rinsing canned foods to remove some of the sodium   Buying foods labeled \"no salt added,\" \"sodium-free,\" \"low sodium\" or \"very low sodium\"   One teaspoon of table salt has about 2,300 mg of sodium, and 2/3 teaspoon of table salt has about 1,500 mg of sodium. When you read food labels, you may be surprised at just how much sodium some processed foods contain. Even low-fat soups, canned vegetables, ready-to-eat cereals and sliced turkey from the local deli -- all foods you may have considered healthy -- often have lots of sodium. You may not notice a difference in taste when you choose low-sodium food and beverages.  If things seem too bland, gradually introduce low-sodium foods and cut back on table salt until you reach your sodium goal. That'll give your palate time to adjust. It can take several weeks for your taste buds to get used to less salty foods. [facility] is located in [address]. Monday, Tuesday & Friday - 8 a.m. to 4 p.m. Wednesday, Thursday - 7 a.m. to 3 p.m. The lab is closed daily from 12 p.m.-12:30 p.m. Saturday lab hours by appointment. Call [phone] to schedule the appointment. Please signup for [employer], which is electronic access to your record if you have not done so. All your results will post on there. [email or web address]   You can NOW use OmniLytics to book your appointments with us, or consider using open access scheduling which is through the edward website [email or web address] and type in Joe Garcia MD and follow the links for \"Schedule Online Now\"    To schedule Imaging or tests at [facility] Scheduling [phone], Go to [facility] A ER Building (For example: CT scans, X rays, Ultrasound, MRI)  Cardiac Testing in ER building Building A second floor Cardiac Testing [phone] (For example: Holter Monitor, Cardiac Stress tests,Event Monitor, or 2D Echocardiograms)  [facility] call [phone] usually in [facility] A  Walk in Clinic in [facility] at [facility] Mon-Fri at 8am-7:30 p.m., and Sat/Sun 9:00a. m.-4:30 p.m. Also at [address]  Call [phone] for info     Please call our office about any questions regarding your treatment/medicines/tests as a result of today's visit. For your safety, read the entire package insert of all medicines prescribed to you and be aware of all of the risks of treatment even beyond those discussed today. All therapies have potential risk of harm or side effects or medication interactions.   It is your duty and for your safety to discuss with the pharmacist and our office with questions, and to notify us and stop treatment if problems arise, but know that our intention is that the benefits outweigh those potential risks and we strive to make you healthier and to improve your quality of life. Referrals, and Radiology Information:    If your insurance requires a referral to a specialist, please allow 5 business days to process your referral request.    If Rosana Jose MD orders a CT or MRI, it may take up to 10 business days to receive approval from your insurance company. Once our office has called informing you that the insurance company approved your testing, please call Central Scheduling at [phone]  Please allow our office 5 business days to contact you regarding any testing results. Refill policies:   Allow 3 business days for refills; controlled substances may take longer and must be picked up from the office in person. Narcotic medications can only be filled in 30 day increments and must be refilled at an office visit only. If your prescription is due for a refill, you may be due for a follow-up appointment. We cannot refill your maintenance medications at a preventative wellness visit. To best provide you care, patients receiving maintenance medications need to be seen at least twice a year.

## 2023-11-07 LAB
ABSOLUTE BASOPHILS: 70 CELLS/UL (ref 0–200)
ABSOLUTE EOSINOPHILS: 140 CELLS/UL (ref 15–500)
ABSOLUTE LYMPHOCYTES: 1715 CELLS/UL (ref 850–3900)
ABSOLUTE MONOCYTES: 847 CELLS/UL (ref 200–950)
ABSOLUTE NEUTROPHILS: 4228 CELLS/UL (ref 1500–7800)
ALBUMIN/GLOBULIN RATIO: 1.5 (CALC) (ref 1–2.5)
ALBUMIN: 4.4 G/DL (ref 3.6–5.1)
ALKALINE PHOSPHATASE: 47 U/L (ref 35–144)
ALT: 12 U/L (ref 9–46)
AST: 19 U/L (ref 10–35)
BASOPHILS: 1 %
BILIRUBIN, TOTAL: 1 MG/DL (ref 0.2–1.2)
BUN: 18 MG/DL (ref 7–25)
CALCIUM: 9.3 MG/DL (ref 8.6–10.3)
CARBON DIOXIDE: 27 MMOL/L (ref 20–32)
CHLORIDE: 103 MMOL/L (ref 98–110)
CHOL/HDLC RATIO: 3.3 (CALC)
CHOLESTEROL, TOTAL: 168 MG/DL
CREATININE: 1.24 MG/DL (ref 0.7–1.28)
EGFR: 59 ML/MIN/1.73M2
EOSINOPHILS: 2 %
GLOBULIN: 2.9 G/DL (CALC) (ref 1.9–3.7)
GLUCOSE: 109 MG/DL (ref 65–99)
HDL CHOLESTEROL: 51 MG/DL
HEMATOCRIT: 46.7 % (ref 38.5–50)
HEMOGLOBIN: 15.6 G/DL (ref 13.2–17.1)
LDL-CHOLESTEROL: 101 MG/DL (CALC)
LYMPHOCYTES: 24.5 %
MCH: 31.2 PG (ref 27–33)
MCHC: 33.4 G/DL (ref 32–36)
MCV: 93.4 FL (ref 80–100)
MONOCYTES: 12.1 %
MPV: 10.1 FL (ref 7.5–12.5)
NEUTROPHILS: 60.4 %
NON-HDL CHOLESTEROL: 117 MG/DL (CALC)
PLATELET COUNT: 195 THOUSAND/UL (ref 140–400)
POTASSIUM: 4.1 MMOL/L (ref 3.5–5.3)
PROTEIN, TOTAL: 7.3 G/DL (ref 6.1–8.1)
RDW: 12.9 % (ref 11–15)
RED BLOOD CELL COUNT: 5 MILLION/UL (ref 4.2–5.8)
SODIUM: 138 MMOL/L (ref 135–146)
TRIGLYCERIDES: 69 MG/DL
TSH W/REFLEX TO FT4: 0.74 MIU/L (ref 0.4–4.5)
WHITE BLOOD CELL COUNT: 7 THOUSAND/UL (ref 3.8–10.8)

## 2023-12-11 RX ORDER — TAMSULOSIN HYDROCHLORIDE 0.4 MG/1
0.4 CAPSULE ORAL DAILY
Qty: 90 CAPSULE | Refills: 3 | Status: SHIPPED | OUTPATIENT
Start: 2023-12-11

## 2024-05-02 ENCOUNTER — OFFICE VISIT (OUTPATIENT)
Dept: FAMILY MEDICINE CLINIC | Facility: CLINIC | Age: 79
End: 2024-05-02
Payer: MEDICARE

## 2024-05-02 VITALS
TEMPERATURE: 98 F | RESPIRATION RATE: 16 BRPM | BODY MASS INDEX: 29.86 KG/M2 | HEART RATE: 76 BPM | DIASTOLIC BLOOD PRESSURE: 70 MMHG | HEIGHT: 68 IN | OXYGEN SATURATION: 98 % | WEIGHT: 197 LBS | SYSTOLIC BLOOD PRESSURE: 114 MMHG

## 2024-05-02 DIAGNOSIS — N40.1 BENIGN PROSTATIC HYPERPLASIA WITH URINARY HESITANCY: ICD-10-CM

## 2024-05-02 DIAGNOSIS — I48.21 PERMANENT ATRIAL FIBRILLATION (HCC): ICD-10-CM

## 2024-05-02 DIAGNOSIS — J44.9 CHRONIC OBSTRUCTIVE PULMONARY DISEASE, UNSPECIFIED COPD TYPE (HCC): ICD-10-CM

## 2024-05-02 DIAGNOSIS — Z79.01 MONITORING FOR LONG-TERM ANTICOAGULANT USE: ICD-10-CM

## 2024-05-02 DIAGNOSIS — Z00.00 ENCOUNTER FOR ANNUAL HEALTH EXAMINATION: ICD-10-CM

## 2024-05-02 DIAGNOSIS — Z51.81 MONITORING FOR LONG-TERM ANTICOAGULANT USE: ICD-10-CM

## 2024-05-02 DIAGNOSIS — E78.2 MIXED HYPERLIPIDEMIA: ICD-10-CM

## 2024-05-02 DIAGNOSIS — Z12.5 SCREENING FOR MALIGNANT NEOPLASM OF PROSTATE: ICD-10-CM

## 2024-05-02 DIAGNOSIS — Z00.00 ENCOUNTER FOR ANNUAL WELLNESS EXAM IN MEDICARE PATIENT: Primary | ICD-10-CM

## 2024-05-02 DIAGNOSIS — I35.8 AORTIC VALVE SCLEROSIS: ICD-10-CM

## 2024-05-02 DIAGNOSIS — I10 PRIMARY HYPERTENSION: ICD-10-CM

## 2024-05-02 DIAGNOSIS — I50.22 CHRONIC SYSTOLIC CONGESTIVE HEART FAILURE (HCC): ICD-10-CM

## 2024-05-02 DIAGNOSIS — I34.0 NONRHEUMATIC MITRAL VALVE REGURGITATION: ICD-10-CM

## 2024-05-02 DIAGNOSIS — R73.03 PREDIABETES: ICD-10-CM

## 2024-05-02 DIAGNOSIS — I70.0 AORTIC ATHEROSCLEROSIS (HCC): ICD-10-CM

## 2024-05-02 DIAGNOSIS — R39.11 BENIGN PROSTATIC HYPERPLASIA WITH URINARY HESITANCY: ICD-10-CM

## 2024-05-02 PROCEDURE — 1125F AMNT PAIN NOTED PAIN PRSNT: CPT | Performed by: FAMILY MEDICINE

## 2024-05-02 PROCEDURE — 3074F SYST BP LT 130 MM HG: CPT | Performed by: FAMILY MEDICINE

## 2024-05-02 PROCEDURE — 3078F DIAST BP <80 MM HG: CPT | Performed by: FAMILY MEDICINE

## 2024-05-02 PROCEDURE — 99499 UNLISTED E&M SERVICE: CPT | Performed by: FAMILY MEDICINE

## 2024-05-02 PROCEDURE — 1159F MED LIST DOCD IN RCRD: CPT | Performed by: FAMILY MEDICINE

## 2024-05-02 PROCEDURE — G0439 PPPS, SUBSEQ VISIT: HCPCS | Performed by: FAMILY MEDICINE

## 2024-05-02 PROCEDURE — 1170F FXNL STATUS ASSESSED: CPT | Performed by: FAMILY MEDICINE

## 2024-05-02 PROCEDURE — 96160 PT-FOCUSED HLTH RISK ASSMT: CPT | Performed by: FAMILY MEDICINE

## 2024-05-02 PROCEDURE — 3008F BODY MASS INDEX DOCD: CPT | Performed by: FAMILY MEDICINE

## 2024-05-02 NOTE — PATIENT INSTRUCTIONS
Thank you for choosing Eric Neil MD at [facility]  To Do: Jared ADILSON Nicole  1. Please see age appropriate health prevention below     Call [phone] to schedule the appointment.  • Please signup for myseekit, which is electronic access to your record if you have not done so.  All your results will post on there.  [email or web address]  • You can NOW use myseekit to book your appointments with us, or consider using open access scheduling which is through the [employer] website [email or web address] and type in Eric Neil MD and follow the links for \"Schedule Online Now\"    •To schedule Imaging or tests at [place] call Central Scheduling [phone], Go to [facility] ER Building (For example: CT scans, X rays, Ultrasound, MRI)  •Cardiac Testing in ER building Building A second floor Cardiac Testing [phone] (For example: Holter Monitor, Cardiac Stress tests,Event Monitor, or 2D Echocardiograms)  •[facility] call [phone] usually in [facility]  •Walk in Clinic in [place] at [address] Mon-Fri at 8am-7:30 p.m., and Sat/Sun 9:00a.m.-4:30 p.m.  Also at [address] [place] [address]  Call [phone] for info    • Please call our office about any questions regarding your treatment/medicines/tests as a result of today's visit.  For your safety, read the entire package insert of all medicines prescribed to you and be aware of all of the risks of treatment even beyond those discussed today.  All therapies have potential risk of harm or side effects or medication interactions.  It is your duty and for your safety to discuss with the pharmacist and our office with questions, and to notify us and stop treatment if problems arise, but know that our intention is that the benefits outweigh those potential risks and we strive to make you healthier and to improve your quality of life.    Referrals, and Radiology Information:    If your insurance requires a referral to a specialist,  please allow 5 business days to process your referral request.    If Eric Neil MD orders a CT or MRI, it may take up to 10 business days to receive approval from your insurance company. Once our office has called informing you that the insurance company approved your testing, please call Central Scheduling at [phone]  Please allow our office 5 business days to contact you regarding any testing results.    Refill policies:   Allow 3 business days for refills; controlled substances may take longer and must be picked up from the office in person.  Narcotic medications can only be filled in 30 day increments and must be refilled at an office visit only.  If your prescription is due for a refill, you may be due for a follow-up appointment.  We cannot refill your maintenance medications at a preventative wellness visit.  To best provide you care, patients receiving maintenance medications need to be seen at least twice a year.    Jared Nicole's SCREENING SCHEDULE   Tests on this list are recommended by your physician but may not be covered, or covered at this frequency, by your insurer.   Please check with your insurance carrier before scheduling to verify coverage.   PREVENTATIVE SERVICES FREQUENCY &  COVERAGE DETAILS LAST COMPLETION DATE   Diabetes Screening    Fasting Blood Sugar / Glucose    One screening every 12 months if never tested or if previously tested but not diagnosed with pre-diabetes   One screening every 6 months if diagnosed with pre-diabetes Lab Results   Component Value Date    GLUCOSE 122 (H) 02/20/2015     (H) 11/06/2023        Cardiovascular Disease Screening    Lipid Panel  Cholesterol  Lipoprotein (HDL)  Triglycerides Covered every 5 years for all Medicare beneficiaries without apparent signs or symptoms of cardiovascular disease Lab Results   Component Value Date    CHOLEST 168 11/06/2023    HDL 51 11/06/2023     (H) 11/06/2023    TRIG 69 11/06/2023          Electrocardiogram (EKG)   Covered if needed at Welcome to Medicare, and non-screening if indicated for medical reasons 05/16/2018      Ultrasound Screening for Abdominal Aortic Aneurysm (AAA) Covered once in a lifetime for one of the following risk factors   • Men who are 65-75 years old and have ever smoked   • Anyone with a family history -     Colorectal Cancer Screening  Covered for ages 50-85; only need ONE of the following:    Colonoscopy   Covered every 10 years    Covered every 2 years if patient is at high risk or previous colonoscopy was abnormal 04/12/2016    No recommendations at this time    Flexible Sigmoidoscopy   Covered every 4 years -    Fecal Occult Blood Test Covered annually -   Prostate Cancer Screening    Prostate-Specific Antigen (PSA) Annually Lab Results   Component Value Date    PSA 1.320 01/23/2017     There are no preventive care reminders to display for this patient.   Immunizations    Influenza Covered once per flu season  Please get every year 09/25/2023  No recommendations at this time    Pneumococcal Each vaccine (Nyxuvyl32 & Odqcrlllm48) covered once after 65 Prevnar 13: 10/12/2015    Myufvqrlv59: 02/25/2010     No recommendations at this time    Hepatitis B One screening covered for patients with certain risk factors   -  No recommendations at this time    Tetanus Toxoid Not covered by Medicare Part B unless medically necessary (cut with metal); may be covered with your pharmacy prescription benefits -    Tetanus, Diptheria and Pertusis TD and TDaP Not covered by Medicare Part B -  No recommendations at this time    Zoster Not covered by Medicare Part B; may be covered with your pharmacy  prescription benefits -  Zoster Vaccines(1 of 2) Never done     Annual Monitoring of Persistent Medications (ACE/ARB, digoxin diuretics, anticonvulsants)    Potassium Annually Lab Results   Component Value Date    K 4.1 11/06/2023         Creatinine   Annually Lab Results   Component Value Date     CREATSERUM 1.24 11/06/2023         BUN Annually Lab Results   Component Value Date    BUN 18 11/06/2023       Drug Serum Conc Annually Lab Results   Component Value Date    DIG 1.8 04/03/2014            Chronic Obstructive Pulmonary Disease (COPD)    Spirometry Annually Spirometry date:

## 2024-05-02 NOTE — PROGRESS NOTES
Subjective:   Jared Nicole is a 78 year old male who presents for a MA (Medicare Advantage) Supervisit (Once per calendar year) and scheduled follow up of multiple significant but stable problems.   Mr. Nicole is a pleasant 78-year-old gentleman with history of hypertension, A. fib on anticoagulation with warfarin, COPD, hyperlipidemia, prediabetes, BPH  here for his wellness .  He takes his medications compliantly and does not report side effects.  He does not have any questions or concerns. He is active and still read books. He does not report fever, cough, chest pain, shortness of breath, palpitations, nausea, vomiting, abdominal pain.  I had reviewed past medical and family histories together with allergy and medication lists documented.     History/Other:   Fall Risk Assessment:   He has been screened for Falls and is low risk.      Cognitive Assessment:   He had a completely normal cognitive assessment - see flowsheet entries     Functional Ability/Status:   Jared Nicole has a completely normal functional assessment. See flowsheet for details.      Depression Screening (PHQ-2/PHQ-9): PHQ-2 SCORE: 0  , done 4/25/2024   If you checked off any problems, how difficult have these problems made it for you to do your work, take care of things at home, or get along with other people?: Not difficult at all    Last [place] Suicide Screening on 5/2/2024 was No Risk.           Advanced Directives:   He does have a Living Will but we do NOT have it on file in Epic.    He does NOT have a Power of  for Health Care. [Do you have a healthcare power of ?: No]  Not discussed      Patient Active Problem List   Diagnosis    Chronic systolic congestive heart failure (HCC)    Mixed hyperlipidemia    Mitral regurgitation    Prediabetes    COPD (chronic obstructive pulmonary disease) (HCC)    Monitoring for long-term anticoagulant use    Permanent atrial fibrillation (HCC)    Aortic atherosclerosis (HCC)     Aortic valve sclerosis    Benign prostatic hyperplasia with urinary hesitancy    Primary hypertension     Allergies:  He has No Known Allergies.    Current Medications:  Outpatient Medications Marked as Taking for the 5/2/24 encounter (Office Visit) with Eric Neil MD   Medication Sig    tamsulosin 0.4 MG Oral Cap Take 1 capsule (0.4 mg total) by mouth daily.    furosemide 20 MG Oral Tab Take 1 tablet (20 mg total) by mouth daily.    metoprolol tartrate 50 MG Oral Tab Take 1 tablet (50 mg total) by mouth 2 (two) times daily.    atorvastatin 10 MG Oral Tab Take 1 tablet (10 mg total) by mouth daily.    dilTIAZem  MG Oral Capsule SR 24 Hr Take 1 capsule (240 mg total) by mouth daily.    warfarin 2.5 MG Oral Tab TAKE ONE TABLET BY MOUTH 2 DAYS WEEKLY OR AS DIRECTED BY ANTICOAGULATION CLINIC.    warfarin 5 MG Oral Tab TAKE ONE TABLET BY MOUTH 5 DAYS WEEKLY OR AS DIRECTED BY ANTICOAGULATION CLINIC.    ascorbic acid (VITAMIN C) 500 MG Oral Tab Take 1 tablet (500 mg total) by mouth daily.    Cholecalciferol (VITAMIN D) 1000 UNITS Oral Cap Take 1 Cap by mouth daily.       Medical History:  He  has a past medical history of Anemia (1/23/2017), Arrhythmia, Atrial fibrillation (HCC), Cancer (HCC), CHF (congestive heart failure) (HCC), Colitis, Colonic diverticular abscess, Congestive heart disease (HCC), musculoskletl dis NEC, Long term (current) use of anticoagulants, Mitral regurgitation, Mitral valve prolapse, Monitoring for long-term anticoagulant use (11/5/2015), Other and unspecified hyperlipidemia, Perforated bowel (HCC), Prediabetes (10/9/2014), SOB (shortness of breath), Unspecified essential hypertension, and Visual impairment.  Surgical History:  He  has a past surgical history that includes colostomy; Colon surgery; Colectomy; other surgical history (Left, 2001); colonoscopy (4/2016); and other surgical history (N/A, 4/12/2016).   Family History:  His family history includes Cancer in his mother; Lung  Disorder in an other family member.  Social History:  He  reports that he quit smoking about 42 years ago. His smoking use included cigarettes. He started smoking about 62 years ago. He has a 20 pack-year smoking history. He has never used smokeless tobacco. He reports current alcohol use. He reports that he does not use drugs.    Tobacco:  He smoked tobacco in the past but quit greater than 12 months ago.  Social History     Tobacco Use   Smoking Status Former    Current packs/day: 0.00    Average packs/day: 1 pack/day for 20.0 years (20.0 ttl pk-yrs)    Types: Cigarettes    Start date: 1962    Quit date: 1982    Years since quittin.3   Smokeless Tobacco Never        CAGE Alcohol Screen:   CAGE screening score of 0 on 2024, showing low risk of alcohol abuse.      Patient Care Team:  Eric Neil MD as PCP - General (Family Medicine)  Zach Matthews MD (Cardiovascular Diseases)  [facility] (CH[facility]CTOR)  Dave Valle MD as Consulting Physician (GASTROENTEROLOGY)    Review of Systems  Constitutional: Negative for chills, fatigue and fever.   HENT: Negative for trouble swallowing.    Respiratory: Negative for shortness of breath.    Cardiovascular: Negative for chest pain, palpitations and leg swelling.   Gastrointestinal: Negative for abdominal pain, constipation, diarrhea, nausea and vomiting.   Neurological: Negative for dizziness, weakness, light-headedness and headaches       Objective:   Physical Exam  Vital signs reviewed   Constitutional: No distress.   HENT: normal Tms and EACs  Mouth/Throat: Oropharynx is clear and moist.   Eyes: Conjunctivae are normal. No scleral icterus.   Neck: Neck supple. No thyromegaly present.   Cardiovascular: Normal rate and normal heart sounds. An irregular rhythm present.   No murmur heard.  Pulmonary/Chest: Effort normal and breath sounds normal. No respiratory distress. He has no wheezes. He has no rales.   Abdominal: Soft.  Bowel sounds are normal. He exhibits no distension and no mass. There is no tenderness.   Musculoskeletal: He exhibits no edema.   Lymphadenopathy:     He has no cervical adenopathy.   Neurological: He is alert.   /70   Pulse 76   Temp 98 °F (36.7 °C) (Temporal)   Resp 16   Ht 5' 8\" (1.727 m)   Wt 197 lb (89.4 kg)   SpO2 98%   BMI 29.95 kg/m²  Estimated body mass index is 29.95 kg/m² as calculated from the following:    Height as of this encounter: 5' 8\" (1.727 m).    Weight as of this encounter: 197 lb (89.4 kg).    Medicare Hearing Assessment:   Hearing Screening    Screening Method: Whisper Test  Whisper Test Result: Pass               Assessment & Plan:   Jared Nicole is a 78 year old male who presents for a Medicare Assessment.     1. Encounter for annual wellness exam in Medicare patient (Primary)  2. Permanent atrial fibrillation (HCC)  Overview:  chronic stable issue, continue present management with observation and medications as noted      3. Chronic obstructive pulmonary disease, unspecified COPD type (HCC)  Overview:  chronic stable issue, continue present management with observation and medications as noted      4. Chronic systolic congestive heart failure (HCC)  Overview:  chronic stable issue, continue present management with observation and medications as noted      5. Aortic atherosclerosis (HCC)  Overview:  chronic stable issue, continue present management with observation and medications as noted      6. Primary hypertension  Overview:  chronic stable issue, continue present management with observation and medications as noted    7. Mixed hyperlipidemia  Overview:  chronic stable issue, continue present management with observation and medications as noted      8. Nonrheumatic mitral valve regurgitation  Overview:  Mild to moderate     chronic stable issue, continue present management with observation and medications as noted      9. Aortic valve sclerosis  Overview:  chronic stable  issue, continue present management with observation and medications as noted      10. Prediabetes  Overview:  chronic stable issue, continue present management with observation and medications as noted      11. Benign prostatic hyperplasia with urinary hesitancy  Overview:  chronic stable issue, continue present management with observation and medications as noted      12. Monitoring for long-term anticoagulant use  Overview:  chronic stable issue, continue present management with observation and medications as noted        The patient indicates understanding of these issues and agrees to the plan.  Continue with current treatment plan.  Follow up in  6  month(s).  Lab work ordered.  Patient reassured.  Reinforced healthy diet, lifestyle, and exercise.      Return in about 6 months (around 11/2/2024), or if symptoms worsen or fail to improve.       This note was prepared using Dragon Medical voice recognition dictation software. As a result errors may occur. When identified these errors have been corrected. While every attempt is made to correct errors during dictation discrepancies may still exist          Eric Neil MD, 5/2/2024     Supplementary Documentation:   General Health:  In the past six months, have you lost more than 10 pounds without trying?: 2 - No  Has your appetite been poor?: No  Type of Diet: Low Carb  How does the patient maintain a good energy level?: Appropriate Exercise;Daily Walks;Stretching  How would you describe your daily physical activity?: Moderate  How would you describe your current health state?: Good  How do you maintain positive mental well-being?: Games;Visiting Friends;Visiting Family  On a scale of 0 to 10, with 0 being no pain and 10 being severe pain, what is your pain level?: 1 - (Mild)  In the past six months, have you experienced urine leakage?: 0-No  At any time do you feel concerned for the safety/well-being of yourself and/or your children, in your home or  elsewhere?: No  Have you had any immunizations at another office such as Influenza, Hepatitis B, Tetanus, or Pneumococcal?: No        Jared Nicole's SCREENING SCHEDULE   Tests on this list are recommended by your physician but may not be covered, or covered at this frequency, by your insurer.   Please check with your insurance carrier before scheduling to verify coverage.   PREVENTATIVE SERVICES FREQUENCY &  COVERAGE DETAILS LAST COMPLETION DATE   Diabetes Screening    Fasting Blood Sugar / Glucose    One screening every 12 months if never tested or if previously tested but not diagnosed with pre-diabetes   One screening every 6 months if diagnosed with pre-diabetes Lab Results   Component Value Date    GLUCOSE 122 (H) 02/20/2015     (H) 11/06/2023        Cardiovascular Disease Screening    Lipid Panel  Cholesterol  Lipoprotein (HDL)  Triglycerides Covered every 5 years for all Medicare beneficiaries without apparent signs or symptoms of cardiovascular disease Lab Results   Component Value Date    CHOLEST 168 11/06/2023    HDL 51 11/06/2023     (H) 11/06/2023    TRIG 69 11/06/2023         Electrocardiogram (EKG)   Covered if needed at Welcome to Medicare, and non-screening if indicated for medical reasons 05/16/2018      Ultrasound Screening for Abdominal Aortic Aneurysm (AAA) Covered once in a lifetime for one of the following risk factors    Men who are 65-75 years old and have ever smoked    Anyone with a family history -     Colorectal Cancer Screening  Covered for ages 50-85; only need ONE of the following:    Colonoscopy   Covered every 10 years    Covered every 2 years if patient is at high risk or previous colonoscopy was abnormal 04/12/2016    No recommendations at this time    Flexible Sigmoidoscopy   Covered every 4 years -    Fecal Occult Blood Test Covered annually -   Prostate Cancer Screening    Prostate-Specific Antigen (PSA) Annually Lab Results   Component Value Date    PSA 1.320  01/23/2017     There are no preventive care reminders to display for this patient.   Immunizations    Influenza Covered once per flu season  Please get every year 09/25/2023  No recommendations at this time    Pneumococcal Each vaccine ([id number] & [id number]) covered once after 65 Prevnar 13: 10/12/2015    [id number]: 02/25/2010     No recommendations at this time    Hepatitis B One screening covered for patients with certain risk factors   -  No recommendations at this time    Tetanus Toxoid Not covered by Medicare Part B unless medically necessary (cut with metal); may be covered with your pharmacy prescription benefits -    Tetanus, Diptheria and Pertusis TD and TDaP Not covered by Medicare Part B -  No recommendations at this time    Zoster Not covered by Medicare Part B; may be covered with your pharmacy  prescription benefits -  Zoster Vaccines(1 of 2) Never done     Annual Monitoring of Persistent Medications (ACE/ARB, digoxin diuretics, anticonvulsants)    Potassium Annually Lab Results   Component Value Date    K 4.1 11/06/2023         Creatinine   Annually Lab Results   Component Value Date    CREATSERUM 1.24 11/06/2023         BUN Annually Lab Results   Component Value Date    BUN 18 11/06/2023       Drug Serum Conc Annually Lab Results   Component Value Date    DIG 1.8 04/03/2014              Chronic Obstructive Pulmonary Disease (COPD)    Spirometry Annually Spirometry date:

## 2024-05-17 DIAGNOSIS — I34.0 NON-RHEUMATIC MITRAL REGURGITATION: ICD-10-CM

## 2024-05-17 DIAGNOSIS — I48.20 CHRONIC ATRIAL FIBRILLATION (HCC): ICD-10-CM

## 2024-05-17 DIAGNOSIS — Z79.01 LONG TERM (CURRENT) USE OF ANTICOAGULANTS: ICD-10-CM

## 2024-05-18 RX ORDER — FUROSEMIDE 20 MG/1
20 TABLET ORAL DAILY
Qty: 90 TABLET | Refills: 3 | Status: SHIPPED | OUTPATIENT
Start: 2024-05-18

## 2024-05-18 RX ORDER — ATORVASTATIN CALCIUM 10 MG/1
10 TABLET, FILM COATED ORAL DAILY
Qty: 90 TABLET | Refills: 3 | Status: SHIPPED | OUTPATIENT
Start: 2024-05-18

## 2024-05-18 RX ORDER — METOPROLOL TARTRATE 50 MG/1
50 TABLET, FILM COATED ORAL 2 TIMES DAILY
Qty: 180 TABLET | Refills: 3 | Status: SHIPPED | OUTPATIENT
Start: 2024-05-18

## 2024-05-18 RX ORDER — DILTIAZEM HYDROCHLORIDE 240 MG/1
240 CAPSULE, COATED, EXTENDED RELEASE ORAL DAILY
Qty: 90 CAPSULE | Refills: 3 | Status: SHIPPED | OUTPATIENT
Start: 2024-05-18

## 2024-05-18 NOTE — TELEPHONE ENCOUNTER
Name from pharmacy: FUROSEMIDE 20 MG Tablet         Will file in chart as: FUROSEMIDE 20 MG Oral Tab    Sig: TAKE 1 TABLET EVERY DAY    Disp: 90 tablet    Refills: 3    Start: 5/17/2024    Class: Normal    Non-formulary For: Chronic atrial fibrillation (HCC), Non-rheumatic mitral regurgitation, Long term (current) use of anticoagulants    Last ordered: 7 months ago (9/26/2023) by Eric Neil MD    Last refill: 3/6/2024    Rx #: [id number]    Hypertension Medications Protocol Fgzopa3205/17/2024 03:07 AM   Protocol Details CMP or BMP in past 12 months    Last BP reading less than 140/90    In person appointment or virtual visit in the past 12 mos or appointment in next 3 mos    EGFRCR or GFRNAA > 50       Name from pharmacy: ATORVASTATIN CALCIUM 10 MG Tablet         Will file in chart as: ATORVASTATIN 10 MG Oral Tab    Sig: TAKE 1 TABLET EVERY DAY    Disp: 90 tablet    Refills: 3    Start: 5/17/2024    Class: Normal    Non-formulary For: Chronic atrial fibrillation (HCC), Non-rheumatic mitral regurgitation, Long term (current) use of anticoagulants    Last ordered: 7 months ago (9/26/2023) by Eric Neil MD    Last refill: 3/6/2024    Rx #: [id number]    Cholesterol Medication Protocol Vatoga4505/17/2024 03:07 AM   Protocol Details ALT < 80    ALT resulted within past year    Lipid panel within past 12 months    In person appointment or virtual visit in the past 12 mos or appointment in next 3 mos       Name from pharmacy: DILTIAZEM HYDROCHLORIDE  MG Capsule Extended Release 24 Hour         Will file in chart as: DILTIAZEM  MG Oral Capsule SR 24 Hr    Sig: TAKE 1 CAPSULE EVERY DAY    Disp: 90 capsule    Refills: 3    Start: 5/17/2024    Class: Normal    Non-formulary    Last ordered: 7 months ago (9/26/2023) by Eric Neil MD    Last refill: 3/6/2024    Rx #: [id number]    Hypertension Medications Protocol Dbyhtz7705/17/2024 03:07 AM   Protocol Details CMP or BMP in past 12 months    Last BP reading  less than 140/90    In person appointment or virtual visit in the past 12 mos or appointment in next 3 mos    EGFRCR or GFRNAA > 50       Name from pharmacy: METOPROLOL TARTRATE 50 MG Tablet         Will file in chart as: METOPROLOL TARTRATE 50 MG Oral Tab    Sig: TAKE 1 TABLET TWICE DAILY    Disp: 180 tablet    Refills: 3    Start: 5/17/2024    Class: Normal    Non-formulary For: Chronic atrial fibrillation (HCC), Non-rheumatic mitral regurgitation, Long term (current) use of anticoagulants    Last ordered: 7 months ago (9/26/2023) by Eric Neil MD    Last refill: 3/6/2024    Rx #: [id number]    Hypertension Medications Protocol Hbctrt3605/17/2024 03:07 AM   Protocol Details CMP or BMP in past 12 months    Last BP reading less than 140/90    In person appointment or virtual visit in the past 12 mos or appointment in next 3 mos    EGFRCR or GFRNAA > 50      To be filled at: [facility] - [place], [place] - [address] [phone], [phone]

## 2024-10-13 RX ORDER — TAMSULOSIN HYDROCHLORIDE 0.4 MG/1
0.4 CAPSULE ORAL DAILY
Qty: 90 CAPSULE | Refills: 3 | Status: SHIPPED | OUTPATIENT
Start: 2024-10-13

## 2024-11-13 ENCOUNTER — LAB ENCOUNTER (OUTPATIENT)
Dept: LAB | Age: 79
End: 2024-11-13
Attending: FAMILY MEDICINE
Payer: MEDICARE

## 2024-11-13 LAB
ALBUMIN SERPL-MCNC: 4.6 G/DL (ref 3.2–4.8)
ALBUMIN/GLOB SERPL: 1.6 {RATIO} (ref 1–2)
ALP LIVER SERPL-CCNC: 56 U/L
ALT SERPL-CCNC: 11 U/L
ANION GAP SERPL CALC-SCNC: <0 MMOL/L (ref 0–18)
AST SERPL-CCNC: 24 U/L (ref ?–34)
BASOPHILS # BLD AUTO: 0.05 X10(3) UL (ref 0–0.2)
BASOPHILS NFR BLD AUTO: 0.5 %
BILIRUB SERPL-MCNC: 0.9 MG/DL (ref 0.2–1.1)
BUN BLD-MCNC: 15 MG/DL (ref 9–23)
CALCIUM BLD-MCNC: 9.6 MG/DL (ref 8.7–10.4)
CHLORIDE SERPL-SCNC: 107 MMOL/L (ref 98–112)
CHOLEST SERPL-MCNC: 161 MG/DL (ref ?–200)
CO2 SERPL-SCNC: 31 MMOL/L (ref 21–32)
CREAT BLD-MCNC: 1.36 MG/DL
EGFRCR SERPLBLD CKD-EPI 2021: 53 ML/MIN/1.73M2 (ref 60–?)
EOSINOPHIL # BLD AUTO: 0.16 X10(3) UL (ref 0–0.7)
EOSINOPHIL NFR BLD AUTO: 1.8 %
ERYTHROCYTE [DISTWIDTH] IN BLOOD BY AUTOMATED COUNT: 14.2 %
FASTING PATIENT LIPID ANSWER: YES
FASTING STATUS PATIENT QL REPORTED: YES
GLOBULIN PLAS-MCNC: 2.9 G/DL (ref 2–3.5)
GLUCOSE BLD-MCNC: 112 MG/DL (ref 70–99)
HCT VFR BLD AUTO: 47.6 %
HDLC SERPL-MCNC: 48 MG/DL (ref 40–59)
HGB BLD-MCNC: 15.5 G/DL
IMM GRANULOCYTES # BLD AUTO: 0.03 X10(3) UL (ref 0–1)
IMM GRANULOCYTES NFR BLD: 0.3 %
LDLC SERPL CALC-MCNC: 96 MG/DL (ref ?–100)
LYMPHOCYTES # BLD AUTO: 1.81 X10(3) UL (ref 1–4)
LYMPHOCYTES NFR BLD AUTO: 19.8 %
MCH RBC QN AUTO: 30.6 PG (ref 26–34)
MCHC RBC AUTO-ENTMCNC: 32.6 G/DL (ref 31–37)
MCV RBC AUTO: 93.9 FL
MONOCYTES # BLD AUTO: 1.12 X10(3) UL (ref 0.1–1)
MONOCYTES NFR BLD AUTO: 12.3 %
NEUTROPHILS # BLD AUTO: 5.96 X10 (3) UL (ref 1.5–7.7)
NEUTROPHILS # BLD AUTO: 5.96 X10(3) UL (ref 1.5–7.7)
NEUTROPHILS NFR BLD AUTO: 65.3 %
NONHDLC SERPL-MCNC: 113 MG/DL (ref ?–130)
OSMOLALITY SERPL CALC.SUM OF ELEC: 286 MOSM/KG (ref 275–295)
PLATELET # BLD AUTO: 231 10(3)UL (ref 150–450)
POTASSIUM SERPL-SCNC: 4.7 MMOL/L (ref 3.5–5.1)
PROT SERPL-MCNC: 7.5 G/DL (ref 5.7–8.2)
PSA SERPL-MCNC: 1.57 NG/ML (ref ?–4)
RBC # BLD AUTO: 5.07 X10(6)UL
SODIUM SERPL-SCNC: 137 MMOL/L (ref 136–145)
TRIGL SERPL-MCNC: 90 MG/DL (ref 30–149)
TSI SER-ACNC: 1.05 UIU/ML (ref 0.55–4.78)
VLDLC SERPL CALC-MCNC: 15 MG/DL (ref 0–30)
WBC # BLD AUTO: 9.1 X10(3) UL (ref 4–11)

## 2024-11-13 PROCEDURE — 80053 COMPREHEN METABOLIC PANEL: CPT | Performed by: FAMILY MEDICINE

## 2024-11-13 PROCEDURE — 84153 ASSAY OF PSA TOTAL: CPT | Performed by: FAMILY MEDICINE

## 2024-11-13 PROCEDURE — 85025 COMPLETE CBC W/AUTO DIFF WBC: CPT | Performed by: FAMILY MEDICINE

## 2024-11-13 PROCEDURE — 36415 COLL VENOUS BLD VENIPUNCTURE: CPT | Performed by: FAMILY MEDICINE

## 2024-11-13 PROCEDURE — 84443 ASSAY THYROID STIM HORMONE: CPT | Performed by: FAMILY MEDICINE

## 2024-11-13 PROCEDURE — 80061 LIPID PANEL: CPT | Performed by: FAMILY MEDICINE

## 2024-11-18 ENCOUNTER — OFFICE VISIT (OUTPATIENT)
Dept: FAMILY MEDICINE CLINIC | Facility: CLINIC | Age: 79
End: 2024-11-18
Payer: MEDICARE

## 2024-11-18 VITALS
WEIGHT: 197 LBS | OXYGEN SATURATION: 98 % | HEIGHT: 68 IN | HEART RATE: 70 BPM | SYSTOLIC BLOOD PRESSURE: 106 MMHG | BODY MASS INDEX: 29.86 KG/M2 | RESPIRATION RATE: 16 BRPM | DIASTOLIC BLOOD PRESSURE: 64 MMHG | TEMPERATURE: 98 F

## 2024-11-18 DIAGNOSIS — I50.22 CHRONIC SYSTOLIC CONGESTIVE HEART FAILURE (HCC): ICD-10-CM

## 2024-11-18 DIAGNOSIS — I10 PRIMARY HYPERTENSION: Primary | ICD-10-CM

## 2024-11-18 DIAGNOSIS — E78.2 MIXED HYPERLIPIDEMIA: ICD-10-CM

## 2024-11-18 PROCEDURE — 99214 OFFICE O/P EST MOD 30 MIN: CPT | Performed by: FAMILY MEDICINE

## 2024-11-18 PROCEDURE — 3074F SYST BP LT 130 MM HG: CPT | Performed by: FAMILY MEDICINE

## 2024-11-18 PROCEDURE — 3078F DIAST BP <80 MM HG: CPT | Performed by: FAMILY MEDICINE

## 2024-11-18 PROCEDURE — 1159F MED LIST DOCD IN RCRD: CPT | Performed by: FAMILY MEDICINE

## 2024-11-18 PROCEDURE — 3008F BODY MASS INDEX DOCD: CPT | Performed by: FAMILY MEDICINE

## 2024-11-18 PROCEDURE — G2211 COMPLEX E/M VISIT ADD ON: HCPCS | Performed by: FAMILY MEDICINE

## 2024-11-18 NOTE — PROGRESS NOTES
Subjective:   Patient ID: Jared Nicole is a 78 year old male.    HPI  Mr. Nicole is a pleasant 78-year-old gentleman with history of hypertension, A. fib on anticoagulation with warfarin, COPD, hyperlipidemia, prediabetes, BPH  here for his wellness .  He takes his medications compliantly and does not report side effects.  He does not have any questions or concerns. He is active and still read books. He does not report fever, cough, chest pain, shortness of breath, palpitations, nausea, vomiting, abdominal pain.  He does steps at home to exercise.  He does steps to exercise.  I had reviewed past medical and family histories together with allergy and medication lists documented.      History/Other:   Review of Systems  Constitutional: Negative for chills, fatigue and fever.   HENT: Negative for trouble swallowing.    Respiratory: Negative for shortness of breath.    Cardiovascular: Negative for chest pain, palpitations and leg swelling.   Gastrointestinal: Negative for abdominal pain, constipation, diarrhea, nausea and vomiting.   Neurological: Negative for dizziness, weakness, light-headedness and headaches     Current Outpatient Medications   Medication Sig Dispense Refill    TAMSULOSIN 0.4 MG Oral Cap TAKE 1 CAPSULE EVERY DAY 90 capsule 3    FUROSEMIDE 20 MG Oral Tab TAKE 1 TABLET EVERY DAY 90 tablet 3    ATORVASTATIN 10 MG Oral Tab TAKE 1 TABLET EVERY DAY 90 tablet 3    DILTIAZEM  MG Oral Capsule SR 24 Hr TAKE 1 CAPSULE EVERY DAY 90 capsule 3    METOPROLOL TARTRATE 50 MG Oral Tab TAKE 1 TABLET TWICE DAILY 180 tablet 3    warfarin 2.5 MG Oral Tab TAKE ONE TABLET BY MOUTH 2 DAYS WEEKLY OR AS DIRECTED BY ANTICOAGULATION CLINIC. 30 tablet 3    warfarin 5 MG Oral Tab TAKE ONE TABLET BY MOUTH 5 DAYS WEEKLY OR AS DIRECTED BY ANTICOAGULATION CLINIC. 65 tablet 3    ascorbic acid (VITAMIN C) 500 MG Oral Tab Take 1 tablet (500 mg total) by mouth daily.      Cholecalciferol (VITAMIN D) 1000 UNITS Oral Cap Take 1 Cap  by mouth daily.       Allergies:Allergies[1]    Objective:   Physical Exam  Vital signs reviewed   Constitutional: No distress.   HENT: normal Tms and EACs  Mouth/Throat: Oropharynx is clear and moist.   Eyes: Conjunctivae are normal. No scleral icterus.   Neck: Neck supple. No thyromegaly present.   Cardiovascular: Normal rate and normal heart sounds. An irregular rhythm present.   No murmur heard.  Pulmonary/Chest: Effort normal and breath sounds normal. No respiratory distress. He has no wheezes. He has no rales.   Abdominal: Soft. Bowel sounds are normal. He exhibits no distension and no mass. There is no tenderness.   Musculoskeletal: He exhibits no edema.   Lymphadenopathy:     He has no cervical adenopathy.   Neurological: He is alert.   Assessment & Plan:   1. Primary hypertension   chronic stable issue, continue present management with observation and medications as noted     2. Mixed hyperlipidemia   chronic stable issue, continue present management with observation and medications as noted     3. Chronic systolic congestive heart failure (HCC)   chronic stable issue, continue present management with observation and medications as noted        Encouraged him to do exercises to strengthen his gait and balance also.  Fall precautions.  Continue with current medication regimen.  Follow-up in 6 months or as needed      This note was prepared using Dragon Medical voice recognition dictation software. As a result errors may occur. When identified these errors have been corrected. While every attempt is made to correct errors during dictation discrepancies may still exist            No orders of the defined types were placed in this encounter.      Meds This Visit:  Requested Prescriptions      No prescriptions requested or ordered in this encounter       Imaging & Referrals:  None         [1] No Known Allergies

## 2024-11-18 NOTE — PATIENT INSTRUCTIONS
Thank you for choosing Eric Neil MD at [facility]  To Do: Jared Nicole  1. High Blood pressure  What Is a Normal Blood Pressure?  The Joint National Committee on Prevention, Detection, Evaluation, and Treatment of High Blood Pressure has classified blood pressure measurements into several categories:  Normal blood pressure is systolic pressure less than 120 and diastolic pressure less than 80 mmHg.   \"Prehypertension\" is systolic pressure of 120-139 or diastolic pressure of 80-89 mmHg.   Stage 1 Hypertension is blood pressure greater than systolic pressure of 140-159 or diastolic pressure of 90-99 mmHg or greater.   Stage 2 Hypertension is systolic pressure of 160 or greater or diastolic pressure of 100 or greater.  What Health Problems Are Associated With High Blood Pressure?  Several potentially serious health conditions are linked to high blood pressure, including:  Atherosclerosis: a disease of the arteries caused by a buildup of plaque, or fatty material, on the inside walls of the blood vessels; hypertension contributes to this buildup by putting added stress and force on the artery walls.   Heart Disease: Heart failure (the heart is not strong enough to pump blood adequately), ischemic heart disease (the heart tissue doesn't get enough blood), and hypertensive hypertrophic cardiomyopathy (thickened, abnormally functioning heart muscle) are all associated with high blood pressure.   Kidney Disease: Hypertension can damage the blood vessels and filters in the kidneys, so that the kidneys cannot excrete waste properly.   Stroke: Hypertension can lead to stroke, either by contributing to the process of atherosclerosis (which can lead to blockages and/or clots), or by weakening the blood vessel wall and causing it to rupture.   Eye Disease: Hypertension can damage the very small blood vessels in the retina.  Bleeding from the aorta, the large blood vessel that supplies blood to the abdomen,  pelvis, and legs   Heart failure   Poor blood supply to the legs  Erectile Dysfunction  Problems with your vision    Overview  \"Blood pressure\" is the force of blood pushing against the walls of the arteries as the heart pumps blood. If this pressure rises and stays high over time, it can damage the body in many ways.  About 1 in 3 adults in the [place] has HBP. The condition itself usually has no signs or symptoms. You can have it for years without knowing it. During this time, though, HBP can damage your heart, blood vessels, kidneys, and other parts of your body.  Knowing your blood pressure numbers is important, even when you're feeling fine. If your blood pressure is normal, you can work with your health care team to keep it that way. If your blood pressure is too high, treatment may help prevent damage to your body's organs.    By [facility] staff   DASH stands for Dietary Approaches to Stop Hypertension. The DASH diet is a lifelong approach to healthy eating that's designed to help treat or prevent high blood pressure (hypertension). The DASH diet encourages you to reduce the sodium in your diet and eat a variety of foods rich in nutrients that help lower blood pressure, such as potassium, calcium and magnesium.   By following the DASH diet, you may be able to reduce your blood pressure by a few points in just two weeks. Over time, your blood pressure could drop by eight to 14 points, which can make a significant difference in your health risks.   DASH DIET  The low-salt Dietary Approaches to Stop Hypertension (DASH) diet is proven to help lower blood pressure. Its effects on blood pressure are sometimes seen within a few weeks.  This diet is not only rich in important nutrients and fiber, but it also includes foods that contain far more potassium (4,700 milligrams (mg)/day), calcium (1,250 mg/day), and magnesium (500 mg/day) and much less sodium (salt) than the typical American diet.  Limit sodium to  no more than 2,300 mg a day (eating only 1,500 mg a day is an even better goal).   Reduce saturated fat to no more than 6% of daily calories and total fat to 27% of daily calories. Low-fat dairy products appear to be especially beneficial for lowering systolic blood pressure.   When choosing fats, select monounsaturated oils, such as olive or canola oils.   Choose whole grains over white flour or pasta products.   Choose fresh fruits and vegetables every day. Many of these foods are rich in potassium, fiber, or both.   Eat nuts, seeds, or legumes (dried beans or peas) daily.   Choose modest amounts of protein (no more than 18% of total daily calories). Fish, skinless poultry, and soy products are the best protein sources.   Other daily nutrient goals in the DASH diet include limiting carbohydrates to 55% of daily calories and dietary cholesterol to 150 mg. Try to get at least 30 grams (g) of daily fiber.    Grains (6 to 8 servings a day)  Grains include bread, cereal, rice and pasta. Examples of one serving of grains include 1 slice whole-wheat bread, 1 ounce (oz.) dry cereal, or 1/2 cup cooked cereal, rice or pasta.   Focus on whole grains because they have more fiber and nutrients than do refined grains. For instance, use brown rice instead of white rice, whole-wheat pasta instead of regular pasta and whole-grain bread instead of white bread. Look for products labeled \"100 percent whole grain\" or \"100 percent whole wheat.\"   Grains are naturally low in fat, so avoid spreading on butter or adding cream and cheese sauces.   Vegetables (4 to 5 servings a day)  Tomatoes, carrots, broccoli, sweet potatoes, greens and other vegetables are full of fiber, vitamins, and such minerals as potassium and magnesium. Examples of one serving include 1 cup raw leafy green vegetables or 1/2 cup cut-up raw or cooked vegetables.   Don't think of vegetables only as side dishes -- a hearty blend of vegetables served over brown rice or  whole-wheat noodles can serve as the main dish for a meal.   Fresh or frozen vegetables are both good choices. When buying frozen and canned vegetables, choose those labeled as low sodium or without added salt.   To increase the number of servings you fit in daily, be creative. In a stir-matthew, for instance, cut the amount of meat in half and double up on the vegetables.   Fruits (4 to 5 servings a day)  Many fruits need little preparation to become a healthy part of a meal or snack. Like vegetables, they're packed with fiber, potassium and magnesium and are typically low in fat -- exceptions include avocados and coconuts. Examples of one serving include 1 medium fruit or 1/2 cup fresh, frozen or canned fruit.   Have a piece of fruit with meals and one as a snack, then round out your day with a dessert of fresh fruits topped with a splash of low-fat yogurt.   Leave on edible peels whenever possible. The peels of apples, pears and most fruits with pits add interesting texture to recipes and contain healthy nutrients and fiber.   Remember that citrus fruits and juice, such as grapefruit, can interact with certain medications, so check with your doctor or pharmacist to see if they're OK for you.   Dairy (2 to 3 servings a day)  Milk, yogurt, cheese and other dairy products are major sources of calcium, vitamin D and protein. But the key is to make sure that you choose dairy products that are low-fat or fat-free because otherwise they can be a major source of fat. Examples of one serving include 1 cup skim or 1% milk, 1 cup yogurt or 1 1/2 oz. cheese.   Low-fat or fat-free frozen yogurt can help you boost the amount of dairy products you eat while offering a sweet treat. Add fruit for a healthy twist.   If you have trouble digesting dairy products, choose lactose-free products or consider taking an over-the-counter product that contains the enzyme lactase, which can reduce or prevent the symptoms of lactose intolerance.    Go easy on regular and even fat-free cheeses because they are typically high in sodium.   Lean meat, poultry and fish (6 or fewer servings a day)  Meat can be a rich source of protein, B vitamins, iron and zinc. But because even lean varieties contain fat and cholesterol, don't make them a mainstay of your diet -- cut back typical meat portions by one-third or one-half and pile on the vegetables instead. Examples of one serving include 1 oz. cooked skinless poultry, seafood or lean meat, 1 egg, or 1 oz. water-packed, no-salt-added canned tuna.   Trim away skin and fat from meat and then broil, grill, roast or poach instead of frying.   Eat heart-healthy fish, such as salmon, herring and tuna. These types of fish are high in omega-3 fatty acids, which can help lower your total cholesterol.   Nuts, seeds and legumes (4 to 5 servings a week)   Almonds, sunflower seeds, kidney beans, peas, lentils and other foods in this family are good sources of magnesium, potassium and protein. They're also full of fiber and phytochemicals, which are plant compounds that may protect against some cancers and cardiovascular disease. Serving sizes are small and are intended to be consumed weekly because these foods are high in calories. Examples of one serving include 1/3 cup (1 1/2 oz.) nuts, 2 tablespoons seeds or 1/2 cup cooked beans or peas.   Nuts sometimes get a bad rap because of their fat content, but they contain healthy types of fat -- monounsaturated fat and omega-3 fatty acids. They're high in calories, however, so eat them in moderation. Try adding them to stir-fries, salads or cereals.   Soybean-based products, such as tofu and tempeh, can be a good alternative to meat because they contain all of the amino acids your body needs to make a complete protein, just like meat. They also contain isoflavones, a type of natural plant compound (phytochemical) that has been shown to have some health benefits.   Fats and oils (2 to  3 servings a day)  Fat helps your body absorb essential vitamins and helps your body's immune system. But too much fat increases your risk of heart disease, diabetes and obesity. The DASH diet strives for a healthy balance by providing 30 percent or less of daily calories from fat, with a focus on the healthier unsaturated fats. Examples of one serving include 1 teaspoon soft margarine, 1 tablespoon low-fat mayonnaise or 2 tablespoons light salad dressing.   Saturated fat and trans fat are the main dietary culprits in raising your blood cholesterol and increasing your risk of coronary artery disease. DASH helps keep your daily saturated fat to less than 10 percent of your total calories by limiting use of meat, butter, cheese, whole milk, cream and eggs in your diet, along with foods made from lard, solid shortenings, and palm and coconut oils.   Avoid trans fat, commonly found in such processed foods as crackers, baked goods and fried items.   Read food labels on margarine and salad dressing so that you can choose those that are lowest in saturated fat and free of trans fat.   Sweets (5 or fewer a week)  You don't have to banish sweets entirely while following the DASH diet -- just go easy on them. Examples of one serving include 1 tablespoon sugar, jelly or jam, 1/2 cup sorbet or 1 cup (8 oz.) lemonade.   When you eat sweets, choose those that are fat-free or low-fat, such as sorbets, fruit ices, jelly beans, hard candy, jarad crackers or low-fat cookies.   Artificial sweeteners such as aspartame (NutraSweet, Equal) and sucralose (Splenda) may help satisfy your sweet tooth while sparing the sugar. But remember that you still must use them sensibly. It's OK to swap a diet cola for a regular cola, but not in place of a more nutritious beverage such as low-fat milk or even plain water.   Cut back on added sugar, which has no nutritional value but can pack on calories.   DASH diet: Alcohol and caffeine  Drinking too  much alcohol can increase blood pressure. The DASH diet recommends that men limit alcohol to two or fewer drinks a day and women one or less.   The DASH diet doesn't address caffeine consumption. The influence of caffeine on blood pressure remains unclear. But caffeine can cause your blood pressure to rise at least temporarily. If you already have high blood pressure or if you think caffeine is affecting your blood pressure, talk to your doctor about your caffeine consumption.   The DASH diet is not designed to promote weight loss, but it can be used as part of an overall weight-loss strategy. The DASH diet is based on a diet of about 2,000 calories a day. If you're trying to lose weight, though, you may want to eat around 1,600 a day. You may need to adjust your serving goals based on your health or individual circumstances -- something your health care team can help you decide.   Tips to cut back on sodium  The foods at the core of the DASH diet are naturally low in sodium. So just by following the DASH diet, you're likely to reduce your sodium intake. You also can cut back on sodium in your diet by:   Using sodium-free spices or flavorings with your food instead of salt   Not adding salt when cooking rice, pasta or hot cereal   Rinsing canned foods to remove some of the sodium   Buying foods labeled \"no salt added,\" \"sodium-free,\" \"low sodium\" or \"very low sodium\"   One teaspoon of table salt has about 2,300 mg of sodium, and 2/3 teaspoon of table salt has about 1,500 mg of sodium. When you read food labels, you may be surprised at just how much sodium some processed foods contain. Even low-fat soups, canned vegetables, ready-to-eat cereals and sliced turkey from the local deli -- all foods you may have considered healthy -- often have lots of sodium.   You may not notice a difference in taste when you choose low-sodium food and beverages. If things seem too bland, gradually introduce low-sodium foods and cut  back on table salt until you reach your sodium goal. That'll give your palate time to adjust. It can take several weeks for your taste buds to get used to less salty foods.      Call [phone] to schedule the appointment.   Please signup for TalkSession, which is electronic access to your record if you have not done so.  All your results will post on there.  [email or web address]   You can NOW use TalkSession to book your appointments with us, or consider using open access scheduling which is through the [place] website [email or web address] and type in Eric Neil MD and follow the links for \"Schedule Online Now\"    To schedule Imaging or tests at [facility] call Central Scheduling [phone], Go to [facility] ER Building (For example: CT scans, X rays, Ultrasound, MRI)  Cardiac Testing in ER building Building A second floor Cardiac Testing [phone] (For example: Holter Monitor, Cardiac Stress tests,Event Monitor, or 2D Echocardiograms)  [facility] call [phone] usually in [facility]  Walk in Clinic in [place] at [address] Mon-Fri at 8am-7:30 p.m., and Sat/Sun 9:00a.m.-4:30 p.m.  Also at [address] [place] [address]  Call [phone] for info     Please call our office about any questions regarding your treatment/medicines/tests as a result of today's visit.  For your safety, read the entire package insert of all medicines prescribed to you and be aware of all of the risks of treatment even beyond those discussed today.  All therapies have potential risk of harm or side effects or medication interactions.  It is your duty and for your safety to discuss with the pharmacist and our office with questions, and to notify us and stop treatment if problems arise, but know that our intention is that the benefits outweigh those potential risks and we strive to make you healthier and to improve your quality of life.    Referrals, and Radiology Information:    If your insurance requires a  referral to a specialist, please allow 5 business days to process your referral request.    If Eric Neil MD orders a CT or MRI, it may take up to 10 business days to receive approval from your insurance company. Once our office has called informing you that the insurance company approved your testing, please call Central Scheduling at [phone]  Please allow our office 5 business days to contact you regarding any testing results.    Refill policies:   Allow 3 business days for refills; controlled substances may take longer and must be picked up from the office in person.  Narcotic medications can only be filled in 30 day increments and must be refilled at an office visit only.  If your prescription is due for a refill, you may be due for a follow-up appointment.  We cannot refill your maintenance medications at a preventative wellness visit.  To best provide you care, patients receiving maintenance medications need to be seen at least twice a year.

## 2025-03-09 DIAGNOSIS — I34.0 NON-RHEUMATIC MITRAL REGURGITATION: ICD-10-CM

## 2025-03-09 DIAGNOSIS — Z79.01 LONG TERM (CURRENT) USE OF ANTICOAGULANTS: ICD-10-CM

## 2025-03-09 DIAGNOSIS — I48.20 CHRONIC ATRIAL FIBRILLATION (HCC): ICD-10-CM

## 2025-03-09 RX ORDER — METOPROLOL TARTRATE 50 MG
50 TABLET ORAL 2 TIMES DAILY
Qty: 180 TABLET | Refills: 3 | Status: SHIPPED | OUTPATIENT
Start: 2025-03-09

## 2025-03-09 RX ORDER — ATORVASTATIN CALCIUM 10 MG/1
10 TABLET, FILM COATED ORAL DAILY
Qty: 90 TABLET | Refills: 3 | Status: SHIPPED | OUTPATIENT
Start: 2025-03-09

## 2025-03-09 RX ORDER — FUROSEMIDE 20 MG/1
20 TABLET ORAL DAILY
Qty: 90 TABLET | Refills: 3 | Status: SHIPPED | OUTPATIENT
Start: 2025-03-09

## 2025-03-09 RX ORDER — DILTIAZEM HYDROCHLORIDE 240 MG/1
240 CAPSULE, COATED, EXTENDED RELEASE ORAL DAILY
Qty: 90 CAPSULE | Refills: 3 | Status: SHIPPED | OUTPATIENT
Start: 2025-03-09

## 2025-04-08 ENCOUNTER — APPOINTMENT (OUTPATIENT)
Dept: ULTRASOUND IMAGING | Age: 80
End: 2025-04-08
Attending: EMERGENCY MEDICINE
Payer: MEDICARE

## 2025-04-08 ENCOUNTER — APPOINTMENT (OUTPATIENT)
Dept: CT IMAGING | Age: 80
End: 2025-04-08
Attending: EMERGENCY MEDICINE
Payer: MEDICARE

## 2025-04-08 ENCOUNTER — HOSPITAL ENCOUNTER (INPATIENT)
Facility: HOSPITAL | Age: 80
LOS: 7 days | Discharge: HOME | End: 2025-04-15
Attending: EMERGENCY MEDICINE | Admitting: HOSPITALIST
Payer: MEDICARE

## 2025-04-08 DIAGNOSIS — I48.20 CHRONIC ATRIAL FIBRILLATION (HCC): ICD-10-CM

## 2025-04-08 DIAGNOSIS — Z79.01 LONG TERM (CURRENT) USE OF ANTICOAGULANTS: ICD-10-CM

## 2025-04-08 DIAGNOSIS — I34.0 NON-RHEUMATIC MITRAL REGURGITATION: ICD-10-CM

## 2025-04-08 DIAGNOSIS — K83.8 COMMON BILE DUCT DILATATION: Primary | ICD-10-CM

## 2025-04-08 LAB
ALBUMIN SERPL-MCNC: 4.3 G/DL (ref 3.2–4.8)
ALBUMIN/GLOB SERPL: 1.5 {RATIO} (ref 1–2)
ALP LIVER SERPL-CCNC: 86 U/L
ALT SERPL-CCNC: 54 U/L
ANION GAP SERPL CALC-SCNC: 10 MMOL/L (ref 0–18)
AST SERPL-CCNC: 105 U/L (ref ?–34)
BASOPHILS # BLD AUTO: 0.05 X10(3) UL (ref 0–0.2)
BASOPHILS NFR BLD AUTO: 0.4 %
BILIRUB SERPL-MCNC: 2.1 MG/DL (ref 0.2–1.1)
BUN BLD-MCNC: 15 MG/DL (ref 9–23)
CALCIUM BLD-MCNC: 9.1 MG/DL (ref 8.7–10.6)
CHLORIDE SERPL-SCNC: 104 MMOL/L (ref 98–112)
CO2 SERPL-SCNC: 24 MMOL/L (ref 21–32)
CREAT BLD-MCNC: 1.13 MG/DL
EGFRCR SERPLBLD CKD-EPI 2021: 65 ML/MIN/1.73M2 (ref 60–?)
EOSINOPHIL # BLD AUTO: 0.05 X10(3) UL (ref 0–0.7)
EOSINOPHIL NFR BLD AUTO: 0.4 %
ERYTHROCYTE [DISTWIDTH] IN BLOOD BY AUTOMATED COUNT: 14.1 %
GLOBULIN PLAS-MCNC: 2.9 G/DL (ref 2–3.5)
GLUCOSE BLD-MCNC: 143 MG/DL (ref 70–99)
HCT VFR BLD AUTO: 45.4 %
HGB BLD-MCNC: 15.4 G/DL
IMM GRANULOCYTES # BLD AUTO: 0.04 X10(3) UL (ref 0–1)
IMM GRANULOCYTES NFR BLD: 0.4 %
INR BLD: 3.12 (ref 0.8–1.2)
INR BLD: 3.32 (ref 0.8–1.2)
LIPASE SERPL-CCNC: 63 U/L (ref 12–53)
LYMPHOCYTES # BLD AUTO: 0.96 X10(3) UL (ref 1–4)
LYMPHOCYTES NFR BLD AUTO: 8.5 %
MCH RBC QN AUTO: 30.9 PG (ref 26–34)
MCHC RBC AUTO-ENTMCNC: 33.9 G/DL (ref 31–37)
MCV RBC AUTO: 91 FL
MONOCYTES # BLD AUTO: 0.42 X10(3) UL (ref 0.1–1)
MONOCYTES NFR BLD AUTO: 3.7 %
NEUTROPHILS # BLD AUTO: 9.81 X10 (3) UL (ref 1.5–7.7)
NEUTROPHILS # BLD AUTO: 9.81 X10(3) UL (ref 1.5–7.7)
NEUTROPHILS NFR BLD AUTO: 86.6 %
OSMOLALITY SERPL CALC.SUM OF ELEC: 289 MOSM/KG (ref 275–295)
PLATELET # BLD AUTO: 224 10(3)UL (ref 150–450)
POTASSIUM SERPL-SCNC: 4.1 MMOL/L (ref 3.5–5.1)
PROT SERPL-MCNC: 7.2 G/DL (ref 5.7–8.2)
PROTHROMBIN TIME: 31.6 SECONDS (ref 11.6–14.8)
PROTHROMBIN TIME: 33.9 SECONDS (ref 11.6–14.8)
Q-T INTERVAL: 396 MS
QRS DURATION: 98 MS
QTC CALCULATION (BEZET): 454 MS
R AXIS: -22 DEGREES
RBC # BLD AUTO: 4.99 X10(6)UL
SODIUM SERPL-SCNC: 138 MMOL/L (ref 136–145)
T AXIS: 8 DEGREES
TROPONIN I SERPL HS-MCNC: <3 NG/L
VENTRICULAR RATE: 79 BPM
WBC # BLD AUTO: 11.3 X10(3) UL (ref 4–11)

## 2025-04-08 PROCEDURE — 99223 1ST HOSP IP/OBS HIGH 75: CPT | Performed by: HOSPITALIST

## 2025-04-08 PROCEDURE — 72131 CT LUMBAR SPINE W/O DYE: CPT | Performed by: EMERGENCY MEDICINE

## 2025-04-08 PROCEDURE — 74177 CT ABD & PELVIS W/CONTRAST: CPT | Performed by: EMERGENCY MEDICINE

## 2025-04-08 PROCEDURE — 76705 ECHO EXAM OF ABDOMEN: CPT | Performed by: EMERGENCY MEDICINE

## 2025-04-08 RX ORDER — POLYETHYLENE GLYCOL 3350 17 G/17G
17 POWDER, FOR SOLUTION ORAL DAILY PRN
Status: DISCONTINUED | OUTPATIENT
Start: 2025-04-08 | End: 2025-04-15

## 2025-04-08 RX ORDER — HYDROMORPHONE HYDROCHLORIDE 1 MG/ML
0.8 INJECTION, SOLUTION INTRAMUSCULAR; INTRAVENOUS; SUBCUTANEOUS EVERY 2 HOUR PRN
Status: DISCONTINUED | OUTPATIENT
Start: 2025-04-08 | End: 2025-04-15

## 2025-04-08 RX ORDER — SODIUM CHLORIDE 9 MG/ML
INJECTION, SOLUTION INTRAVENOUS CONTINUOUS
Status: DISCONTINUED | OUTPATIENT
Start: 2025-04-08 | End: 2025-04-10

## 2025-04-08 RX ORDER — HYDROMORPHONE HYDROCHLORIDE 1 MG/ML
0.2 INJECTION, SOLUTION INTRAMUSCULAR; INTRAVENOUS; SUBCUTANEOUS EVERY 2 HOUR PRN
Status: DISCONTINUED | OUTPATIENT
Start: 2025-04-08 | End: 2025-04-15

## 2025-04-08 RX ORDER — TAMSULOSIN HYDROCHLORIDE 0.4 MG/1
0.4 CAPSULE ORAL DAILY
Status: DISCONTINUED | OUTPATIENT
Start: 2025-04-09 | End: 2025-04-15

## 2025-04-08 RX ORDER — DILTIAZEM HYDROCHLORIDE 120 MG/1
240 CAPSULE, EXTENDED RELEASE ORAL DAILY
Status: DISCONTINUED | OUTPATIENT
Start: 2025-04-09 | End: 2025-04-11

## 2025-04-08 RX ORDER — METOPROLOL TARTRATE 50 MG
50 TABLET ORAL
Status: DISCONTINUED | OUTPATIENT
Start: 2025-04-08 | End: 2025-04-11

## 2025-04-08 RX ORDER — SENNOSIDES 8.6 MG
17.2 TABLET ORAL NIGHTLY PRN
Status: DISCONTINUED | OUTPATIENT
Start: 2025-04-08 | End: 2025-04-15

## 2025-04-08 RX ORDER — BISACODYL 10 MG
10 SUPPOSITORY, RECTAL RECTAL
Status: DISCONTINUED | OUTPATIENT
Start: 2025-04-08 | End: 2025-04-15

## 2025-04-08 RX ORDER — HYDROMORPHONE HYDROCHLORIDE 1 MG/ML
0.75 INJECTION, SOLUTION INTRAMUSCULAR; INTRAVENOUS; SUBCUTANEOUS ONCE
Status: COMPLETED | OUTPATIENT
Start: 2025-04-08 | End: 2025-04-08

## 2025-04-08 RX ORDER — ACETAMINOPHEN 500 MG
500 TABLET ORAL EVERY 4 HOURS PRN
Status: DISCONTINUED | OUTPATIENT
Start: 2025-04-08 | End: 2025-04-12

## 2025-04-08 RX ORDER — ATORVASTATIN CALCIUM 10 MG/1
10 TABLET, FILM COATED ORAL DAILY
Status: DISCONTINUED | OUTPATIENT
Start: 2025-04-09 | End: 2025-04-09

## 2025-04-08 RX ORDER — SODIUM PHOSPHATE, DIBASIC AND SODIUM PHOSPHATE, MONOBASIC 7; 19 G/230ML; G/230ML
1 ENEMA RECTAL ONCE AS NEEDED
Status: DISCONTINUED | OUTPATIENT
Start: 2025-04-08 | End: 2025-04-15

## 2025-04-08 RX ORDER — ONDANSETRON 2 MG/ML
4 INJECTION INTRAMUSCULAR; INTRAVENOUS EVERY 6 HOURS PRN
Status: DISCONTINUED | OUTPATIENT
Start: 2025-04-08 | End: 2025-04-15

## 2025-04-08 RX ORDER — HYDROMORPHONE HYDROCHLORIDE 1 MG/ML
0.5 INJECTION, SOLUTION INTRAMUSCULAR; INTRAVENOUS; SUBCUTANEOUS ONCE
Status: COMPLETED | OUTPATIENT
Start: 2025-04-08 | End: 2025-04-08

## 2025-04-08 RX ORDER — METOCLOPRAMIDE HYDROCHLORIDE 5 MG/ML
5 INJECTION INTRAMUSCULAR; INTRAVENOUS EVERY 8 HOURS PRN
Status: DISCONTINUED | OUTPATIENT
Start: 2025-04-08 | End: 2025-04-15

## 2025-04-08 RX ORDER — HYDROMORPHONE HYDROCHLORIDE 1 MG/ML
0.4 INJECTION, SOLUTION INTRAMUSCULAR; INTRAVENOUS; SUBCUTANEOUS EVERY 2 HOUR PRN
Status: DISCONTINUED | OUTPATIENT
Start: 2025-04-08 | End: 2025-04-15

## 2025-04-08 NOTE — ED INITIAL ASSESSMENT (HPI)
Pt has had back pain following an injury a week ago after moving a tv, now having abd pain and nausea.

## 2025-04-08 NOTE — ED PROVIDER NOTES
Patient Seen in: [facility] In [place]      History     Chief Complaint   Patient presents with    Abdomen/Flank Pain    Back Pain     Stated Complaint: injured back week ago, having abd. pain    Subjective:   The history is provided by the patient.         79-year-old male with history significant for atrial fibrillation on Coumadin (last dose last night) presents to the ER complaining of sudden severe right upper quadrant abdominal pain without nausea, vomiting, diarrhea, fevers, chills, chest pain, shortness of breath, urinary complaints.  Upper episodes.  Review of systems significant for right lumbar back pain that been going on for the past 4 to 5 days, started after heavy lifting, nonradiating, no lower extremity paresthesias weakness or saddle anesthesia.    Objective:     Past Medical History:    Anemia    Arrhythmia    atrial fibrillation    Atrial fibrillation (HCC)    Cancer (HCC)    SKIN    CHF (congestive heart failure) (HCC)    mild     Colitis    DIVERTICULITIS    Colonic diverticular abscess    Congestive heart disease (HCC)    Hx musculoskletl dis NEC    BACK/DISC ISSUES    Long term (current) use of anticoagulants    Mitral regurgitation    Mild to moderate     Mitral valve prolapse    Monitoring for long-term anticoagulant use    Other and unspecified hyperlipidemia    Perforated bowel (HCC)    Prediabetes    SOB (shortness of breath)    Unspecified essential hypertension    Visual impairment              Past Surgical History:   Procedure Laterality Date    Colectomy      Colon surgery      Colonoscopy  4/2016    No polyps/inflammation noted, moderate to severe diverticulosis. Fecal transplant completed.    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    Tobacco Use    Smoking status: Former     Current packs/day: 0.00     Average packs/day: 1 pack/day for 20.0 years (20.0 ttl pk-yrs)     Types: Cigarettes     Start date: 1962     Quit date: 1982     Years since quittin.2    Smokeless tobacco: Never   Vaping Use    Vaping status: Never Used   Substance and Sexual Activity    Alcohol use: Yes     Alcohol/week: 0.0 - 2.0 standard drinks of alcohol    Drug use: No   Other Topics Concern    Caffeine Concern Yes     Comment: 1-2 qweek     Exercise Yes     Comment: qday                   Physical Exam     ED Triage Vitals   BP 25 1054 111/72   Pulse 25 1054 73   Resp 25 1054 18   Temp 25 1054 96.7 °F (35.9 °C)   Temp src 25 1054 Oral   SpO2 25 1054 96 %   O2 Device 25 1237 None (Room air)       Current Vitals:   Vital Signs  BP: 113/69  Pulse: 99  Resp: 18  Temp: 97 °F (36.1 °C)  Temp src: Oral    Oxygen Therapy  SpO2: 98 %  O2 Device: Nasal cannula  O2 Flow Rate (L/min): 2 L/min        Physical Exam  Vitals and nursing note reviewed.   Constitutional:       General: He is in acute distress.      Appearance: He is well-developed. He is not ill-appearing or toxic-appearing.      Comments: Patient is grimacing, threatening staff harm to staff if he does not get any analgesia   HENT:      Head: Normocephalic and atraumatic.   Eyes:      Extraocular Movements: Extraocular movements intact.      Pupils: Pupils are equal, round, and reactive to light.   Cardiovascular:      Rate and Rhythm: Normal rate and regular rhythm.      Pulses: Normal pulses.      Heart sounds: Normal heart sounds. No murmur heard.     No gallop.   Pulmonary:      Effort: Pulmonary effort is normal. No respiratory distress.      Breath sounds: Normal breath sounds.    Abdominal:      General: Abdomen is flat.      Palpations: Abdomen is soft.      Tenderness: There is abdominal tenderness in the right upper quadrant, right lower quadrant and epigastric area.   Musculoskeletal:      Cervical back: Neck supple.   Skin:     General: Skin is warm and dry.      Capillary Refill: Capillary refill takes less than 2 seconds.   Neurological:      General: No focal deficit present.      Mental Status: He is alert and oriented to person, place, and time.   Psychiatric:         Mood and Affect: Mood normal.         Behavior: Behavior normal.             ED Course     Labs Reviewed   CBC WITH DIFFERENTIAL WITH PLATELET - Abnormal; Notable for the following components:       Result Value    WBC 11.3 (*)     Neutrophil Absolute Prelim 9.81 (*)     Neutrophil Absolute 9.81 (*)     Lymphocyte Absolute 0.96 (*)     All other components within normal limits   COMP METABOLIC PANEL (14) - Abnormal; Notable for the following components:    Glucose 143 (*)      (*)     ALT 54 (*)     Bilirubin, Total 2.1 (*)     All other components within normal limits   LIPASE - Abnormal; Notable for the following components:    Lipase 63 (*)     All other components within normal limits   PROTHROMBIN TIME (PT) - Abnormal; Notable for the following components:    PT 31.6 (*)     INR 3.12 (*)     All other components within normal limits   TROPONIN I HIGH SENSITIVITY - Normal   RAINBOW DRAW LAVENDER   RAINBOW DRAW LIGHT GREEN   RAINBOW DRAW BLUE     EKG    Rate, intervals and axes as noted on EKG Report.  Rate: 79,  Rhythm: Atrial Fibrillation  Reading: Normal axis, right bundle branch block, nonspecific T wave changes in the limb leads           ED Course as of 04/08/25 1549  ------------------------------------------------------------  Time: 04/08 1430  Comment: I independently interpreted labs, INR appropriate elevated for Coumadin use.  Trivial/MILD derangement of his liver enzymes, T. bili and lipase.        US GALLBLADDER (CPT=76705)    Result Date: 4/8/2025  CONCLUSION:  The central common bile duct and pancreas were not able to be clearly identified due to overlying bowel gas.  There is known dilatation of the gallbladder and of the intra and extrahepatic bile ducts.  Again these structures could be best assessed with ERCP or MRCP.   LOCATION:  [id number]    Dictated by (CST): Alfredo Daniels MD on 4/08/2025 at 3:22 PM     Finalized by (CST): Alfredo Daniels MD on 4/08/2025 at 3:25 PM       CT ABDOMEN+PELVIS(CONTRAST ONLY)(CPT=74177)    Result Date: 4/8/2025  CONCLUSION:  1. Dilation of the common bile duct and gallbladder distension with luminal filling defect within the distal common bile duct.  This could represent calculus, sludge, versus neoplasm.  Recommend further evaluation with ERCP. 2. Ventral abdominal hernia containing unobstructed transverse colon. 3. Colonic diverticulosis. 4. Bibasilar interstitial opacities, which may reflect infectious/inflammatory process versus fibrotic lung disease.   LOCATION:  [id number]   Dictated by (CST): Darron Segura MD on 4/08/2025 at 1:39 PM     Finalized by (CST): Darron Segura MD on 4/08/2025 at 1:44 PM       CT SPINE LUMBAR (CPT=72131)    Result Date: 4/8/2025  CONCLUSION:  1. No acute lumbar spine fracture.  Chronic L1 compression fracture. 2. Scoliosis and degenerative changes of the spine with associated canal and neural foraminal stenosis as detailed.   LOCATION:  [id number]   Dictated by (CST): Darron Segura MD on 4/08/2025 at 1:32 PM     Finalized by (CST): Darron Segura MD on 4/08/2025 at 1:36 PM             [facility]          Admission disposition: 4/8/2025  2:34 PM           Medical Decision Making  Problems Addressed:  Common bile duct dilatation: undiagnosed new problem with uncertain prognosis     Details: Acute sudden severe right upper quadrant pain with signs of of common bile duct obstruction/dilation on CT.  No visible stones on CT or ultrasound.  Differential diagnosis  includes pancreatic cancer, choledocholithiasis, [place], cholecystitis, cholelithiasis, cholecystitis, cholangitis,    Amount and/or Complexity of Data Reviewed  Labs: ordered. Decision-making details documented in ED Course.  Radiology: ordered. Decision-making details documented in ED Course.  Discussion of management or test interpretation with external provider(s): Discussed case with gastroenterology, Dr. Jolley, planning for intervention while hospitalized    Risk  Parenteral controlled substances.  Drug therapy requiring intensive monitoring for toxicity.  Decision regarding hospitalization.        Disposition and Plan     Clinical Impression:  1. Common bile duct dilatation         Disposition:  Admit  4/8/2025  2:34 pm    Follow-up:  No follow-up provider specified.        Medications Prescribed:  Current Discharge Medication List              Supplementary Documentation:         Hospital Problems       Present on Admission  Date Reviewed: 11/18/2024            ICD-10-CM Noted POA    * (Principal) Common bile duct dilatation K83.8 4/8/2025 Unknown

## 2025-04-08 NOTE — PROGRESS NOTES
BRIEF [facility] GI PROGRESS NOTE    Case reviewed w/ PF ED MD (Dr. Brar) + Int-GI (Dr. Cunha) this afternoon. Pt still at  ED, awaiting transfer to EDW for admission.    79M w/ CHF + Afib on chronic a/c (warfarin), presenting w/ RUQ pain + elevated WBC + elevated LFTs, CT suspicious for CBD stone +/- early cholangitis. Last dose of warfarin was last evening PTA. AFVSS in ED, non-toxic + no severe abd pain at the moment.    Con CT AP 4/8 (images reviewed)  1. Dilation of the common bile duct and gallbladder distension with luminal filling defect within the distal common bile duct.  This could represent calculus, sludge, versus neoplasm.  Recommend further evaluation with ERCP.   2. Ventral abdominal hernia containing unobstructed transverse colon.   3. Colonic diverticulosis.   4. Bibasilar interstitial opacities, which may reflect infectious/inflammatory process versus fibrotic lung disease.     RECS FOR NOW:  - Admit to Hospitalist team w/ [facility] consult, follow labs + course. Anticipate arriving later today.  - Supportive care w/ IVF + IV abx.  - OK for regular diet at tolerated for now, CLD/FLD if not.  - Hold warfarin for now, may need to consider EUS +/- ERCP on Thursday if still required pending LFT trend + clinical course.  - Surgery consult in the meantime as well.  - Will reassess in the morning, can help plan/schedule procedure(s) if needed.    aMrv Jolley MD  [facility]  Department of Gastroenterology

## 2025-04-08 NOTE — H&P
[facility]     Jared Nicole Patient Status:  Inpatient    1945 MRN [id number]   Location [facility] 3NW-A Attending Raymond Saavedra MD   Hosp Day # 0 PCP Eric Neil MD     Chief Complaint: Abdominal pain    Subjective:    History of Present Illness:     Jared Nicole is a 79 year old male with atrial fibrillation, BPH, chronic heart failure preserved ejection fraction, hyperlipidemia presents to the emergency room with right upper quadrant pain that came on suddenly.  Sharp pain intermittent.  Patient denies any nausea or vomiting or diarrhea.  No fevers, chills.  No chest pain or shortness of breath or palpitations.  No hematochezia, melena, hematuria.  Patient denies looking jaundiced.    History/Other:    Past Medical History:  Past Medical History:    Anemia    Arrhythmia    atrial fibrillation    Atrial fibrillation (HCC)    Cancer (HCC)    SKIN    CHF (congestive heart failure) (HCC)    mild     Colitis    DIVERTICULITIS    Colonic diverticular abscess    Congestive heart disease (HCC)    Hx musculoskletl dis NEC    BACK/DISC ISSUES    Long term (current) use of anticoagulants    Mitral regurgitation    Mild to moderate     Mitral valve prolapse    Monitoring for long-term anticoagulant use    Other and unspecified hyperlipidemia    Perforated bowel (HCC)    Prediabetes    SOB (shortness of breath)    Unspecified essential hypertension    Visual impairment     Past Surgical History:   Past Surgical History:   Procedure Laterality Date    Colectomy      Colon surgery      Colonoscopy  2016    No polyps/inflammation noted, moderate to severe diverticulosis. Fecal transplant completed.    Colostomy      with reversal     Other surgical history Left     eye surgery, detached retina    Other surgical history N/A 2016    Procedure: COLONOSCOPY W/FECAL MICROBIOTA TRANSPLANT;  Surgeon: Dave Valle MD;  Location:  ENDOSCOPY      Family  History:   Family History   Problem Relation Age of Onset    Lung Disorder Other         COPD, Family History of     Cancer Mother         Colon, Family History of      Social History:    reports that he quit smoking about 43 years ago. His smoking use included cigarettes. He started smoking about 63 years ago. He has a 20 pack-year smoking history. He has never used smokeless tobacco. He reports current alcohol use. He reports that he does not use drugs.     Allergies: Allergies[1]    Medications:  Medications Ordered Prior to Encounter[2]    Review of Systems:   A comprehensive review of systems was completed.    Pertinent positives and negatives noted in the HPI.    Objective:   Physical Exam:    /69   Pulse 99   Temp 97 °F (36.1 °C)   Resp 18   Ht 5' 8\" (1.727 m)   Wt 194 lb (88 kg)   SpO2 98%   BMI 29.50 kg/m²   General: No acute distress, Alert  Respiratory: No rhonchi, no wheezes  Cardiovascular: S1, S2. Regular rate and rhythm  Abdomen: Soft, Non-tender, non-distended, positive bowel sounds  Neuro: No new focal deficits  Extremities: No edema      Results:    Labs:      Labs Last 24 Hours:    Recent Labs   Lab 04/08/25  1121   RBC 4.99   HGB 15.4   HCT 45.4   MCV 91.0   MCH 30.9   MCHC 33.9   RDW 14.1   NEPRELIM 9.81*   WBC 11.3*   .0       Recent Labs   Lab 04/08/25  1121   *   BUN 15   CREATSERUM 1.13   EGFRCR 65   CA 9.1   ALB 4.3      K 4.1      CO2 24.0   ALKPHO 86   *   ALT 54*   BILT 2.1*   TP 7.2       Estimated Glomerular Filtration Rate: 65 mL/min/1.73m2 (result from lab).    Lab Results   Component Value Date    PT 26.4 (H) 04/04/2014    PT 24.7 (H) 04/03/2014    PT 18.0 (H) 06/29/2013    INR 3.12 (H) 04/08/2025    INR 2.7 03/15/2022    INR 2.0 02/28/2022       Recent Labs   Lab 04/08/25  1121   TROPHS <3       No results for input(s): \"TROP\", \"PBNP\" in the last 168 hours.    No results for input(s): \"PCT\" in the last 168 hours.    Imaging: Imaging  data reviewed in Epic.    Assessment & Plan:      # Right upper quadrant pain  - Imaging showing common bile duct dilatation  - Plan for ERCP  - GI on consult  - General Surgery consulted    # Chronic atrial fibrillation  - Will continue on metoprolol and Cardizem for rate control.  - Coumadin on hold.    # BPH  - Will continue on tamsulosin    # Hyperlipidemia  - Will continue on statin therapy.        Plan of care discussed with patient at bedside.    Darrel Eubanks, DO    Supplementary Documentation:     The 21st Century Cures Act makes medical notes like these available to patients in the interest of transparency. Please be advised this is a medical document. Medical documents are intended to carry relevant information, facts as evident, and the clinical opinion of the practitioner. The medical note is intended as peer to peer communication and may appear blunt or direct. It is written in medical language and may contain abbreviations or verbiage that are unfamiliar.               **Certification      PHYSICIAN Certification of Need for Inpatient Hospitalization - Initial Certification    Patient will require inpatient services that will reasonably be expected to span two midnight's based on the clinical documentation in H+P.   Based on patients current state of illness, I anticipate that, after discharge, patient will require TBD.                      [1] No Known Allergies  [2]   No current facility-administered medications on file prior to encounter.     Current Outpatient Medications on File Prior to Encounter   Medication Sig Dispense Refill    DILTIAZEM  MG Oral Capsule SR 24 Hr TAKE 1 CAPSULE EVERY DAY 90 capsule 3    ATORVASTATIN 10 MG Oral Tab TAKE 1 TABLET EVERY DAY 90 tablet 3    FUROSEMIDE 20 MG Oral Tab TAKE 1 TABLET EVERY DAY 90 tablet 3    METOPROLOL TARTRATE 50 MG Oral Tab TAKE 1 TABLET TWICE DAILY 180 tablet 3    TAMSULOSIN 0.4 MG Oral Cap TAKE 1 CAPSULE EVERY DAY 90 capsule 3     warfarin 2.5 MG Oral Tab TAKE ONE TABLET BY MOUTH 2 DAYS WEEKLY OR AS DIRECTED BY ANTICOAGULATION CLINIC. 30 tablet 3    warfarin 5 MG Oral Tab TAKE ONE TABLET BY MOUTH 5 DAYS WEEKLY OR AS DIRECTED BY ANTICOAGULATION CLINIC. 65 tablet 3    ascorbic acid (VITAMIN C) 500 MG Oral Tab Take 1 tablet (500 mg total) by mouth daily.      Cholecalciferol (VITAMIN D) 1000 UNITS Oral Cap Take 1 Cap by mouth daily.

## 2025-04-08 NOTE — ED QUICK NOTES
Orders for admission, patient is aware of plan and ready to go upstairs. Any questions, please call ED RN Winsome at extension [phone].     Patient Covid vaccination status: Fully vaccinated     COVID Test Ordered in ED: None    COVID Suspicion at Admission: N/A    Running Infusions:  None    Mental Status/LOC at time of transport: alert and oriented x4/4    Other pertinent information:   CIWA score: N/A   NIH score:  N/A

## 2025-04-09 ENCOUNTER — ANESTHESIA (OUTPATIENT)
Dept: ENDOSCOPY | Facility: HOSPITAL | Age: 80
End: 2025-04-09
Payer: MEDICARE

## 2025-04-09 ENCOUNTER — ANESTHESIA EVENT (OUTPATIENT)
Dept: ENDOSCOPY | Facility: HOSPITAL | Age: 80
End: 2025-04-09
Payer: MEDICARE

## 2025-04-09 ENCOUNTER — APPOINTMENT (OUTPATIENT)
Dept: GENERAL RADIOLOGY | Facility: HOSPITAL | Age: 80
End: 2025-04-09
Attending: INTERNAL MEDICINE
Payer: MEDICARE

## 2025-04-09 PROBLEM — K83.09 CHOLANGITIS (HCC): Status: ACTIVE | Noted: 2025-04-09

## 2025-04-09 PROBLEM — K80.01 CALCULUS OF GALLBLADDER WITH ACUTE CHOLECYSTITIS AND OBSTRUCTION: Status: ACTIVE | Noted: 2025-04-09

## 2025-04-09 PROBLEM — Z79.01 ANTICOAGULATED ON COUMADIN: Status: ACTIVE | Noted: 2025-04-09

## 2025-04-09 PROBLEM — I48.0 PAROXYSMAL ATRIAL FIBRILLATION (HCC): Status: ACTIVE | Noted: 2025-04-09

## 2025-04-09 PROBLEM — K80.50 CHOLEDOCHOLITHIASIS: Status: ACTIVE | Noted: 2025-04-09

## 2025-04-09 LAB
ALBUMIN SERPL-MCNC: 4.1 G/DL (ref 3.2–4.8)
ALBUMIN/GLOB SERPL: 1.6 {RATIO} (ref 1–2)
ALP LIVER SERPL-CCNC: 106 U/L (ref 45–117)
ALT SERPL-CCNC: 224 U/L (ref 10–49)
ANION GAP SERPL CALC-SCNC: 9 MMOL/L (ref 0–18)
ANTIBODY SCREEN: NEGATIVE
AST SERPL-CCNC: 215 U/L (ref ?–34)
BASOPHILS # BLD AUTO: 0.02 X10(3) UL (ref 0–0.2)
BASOPHILS NFR BLD AUTO: 0.1 %
BILIRUB SERPL-MCNC: 5.5 MG/DL (ref 0.2–1.1)
BUN BLD-MCNC: 15 MG/DL (ref 9–23)
CALCIUM BLD-MCNC: 9.1 MG/DL (ref 8.7–10.6)
CHLORIDE SERPL-SCNC: 105 MMOL/L (ref 98–112)
CO2 SERPL-SCNC: 25 MMOL/L (ref 21–32)
CREAT BLD-MCNC: 1.34 MG/DL (ref 0.7–1.3)
EGFRCR SERPLBLD CKD-EPI 2021: 53 ML/MIN/1.73M2 (ref 60–?)
EOSINOPHIL # BLD AUTO: 0 X10(3) UL (ref 0–0.7)
EOSINOPHIL NFR BLD AUTO: 0 %
ERYTHROCYTE [DISTWIDTH] IN BLOOD BY AUTOMATED COUNT: 14.5 %
GLOBULIN PLAS-MCNC: 2.5 G/DL (ref 2–3.5)
GLUCOSE BLD-MCNC: 159 MG/DL (ref 70–99)
HCT VFR BLD AUTO: 41.3 % (ref 39–53)
HGB BLD-MCNC: 14 G/DL (ref 13–17.5)
IMM GRANULOCYTES # BLD AUTO: 0.09 X10(3) UL (ref 0–1)
IMM GRANULOCYTES NFR BLD: 0.6 %
INR BLD: 1.81 (ref 0.8–1.2)
INR BLD: 4.08 (ref 0.8–1.2)
LYMPHOCYTES # BLD AUTO: 0.68 X10(3) UL (ref 1–4)
LYMPHOCYTES NFR BLD AUTO: 4.4 %
MCH RBC QN AUTO: 31 PG (ref 26–34)
MCHC RBC AUTO-ENTMCNC: 33.9 G/DL (ref 31–37)
MCV RBC AUTO: 91.6 FL (ref 80–100)
MONOCYTES # BLD AUTO: 1.41 X10(3) UL (ref 0.1–1)
MONOCYTES NFR BLD AUTO: 9.1 %
NEUTROPHILS # BLD AUTO: 13.36 X10 (3) UL (ref 1.5–7.7)
NEUTROPHILS # BLD AUTO: 13.36 X10(3) UL (ref 1.5–7.7)
NEUTROPHILS NFR BLD AUTO: 85.8 %
OSMOLALITY SERPL CALC.SUM OF ELEC: 292 MOSM/KG (ref 275–295)
PLATELET # BLD AUTO: 170 10(3)UL (ref 150–450)
POTASSIUM SERPL-SCNC: 4.4 MMOL/L (ref 3.5–5.1)
PROT SERPL-MCNC: 6.6 G/DL (ref 5.7–8.2)
PROTHROMBIN TIME: 21.1 SECONDS (ref 11.6–14.8)
PROTHROMBIN TIME: 39.9 SECONDS (ref 11.6–14.8)
RBC # BLD AUTO: 4.51 X10(6)UL (ref 3.8–5.8)
RH BLOOD TYPE: POSITIVE
SODIUM SERPL-SCNC: 139 MMOL/L (ref 136–145)
WBC # BLD AUTO: 15.6 X10(3) UL (ref 4–11)

## 2025-04-09 PROCEDURE — 74328 X-RAY BILE DUCT ENDOSCOPY: CPT | Performed by: INTERNAL MEDICINE

## 2025-04-09 PROCEDURE — 0F798DZ DILATION OF COMMON BILE DUCT WITH INTRALUMINAL DEVICE, VIA NATURAL OR ARTIFICIAL OPENING ENDOSCOPIC: ICD-10-PCS | Performed by: INTERNAL MEDICINE

## 2025-04-09 PROCEDURE — 99221 1ST HOSP IP/OBS SF/LOW 40: CPT | Performed by: SURGERY

## 2025-04-09 PROCEDURE — 99232 SBSQ HOSP IP/OBS MODERATE 35: CPT | Performed by: HOSPITALIST

## 2025-04-09 PROCEDURE — BF131ZZ FLUOROSCOPY OF GALLBLADDER AND BILE DUCTS USING LOW OSMOLAR CONTRAST: ICD-10-PCS | Performed by: INTERNAL MEDICINE

## 2025-04-09 DEVICE — COTTON-LEUNG BILIARY STENT
Type: IMPLANTABLE DEVICE | Status: FUNCTIONAL
Brand: COTTON-LEUNG

## 2025-04-09 RX ORDER — HYDROCODONE BITARTRATE AND ACETAMINOPHEN 5; 325 MG/1; MG/1
1 TABLET ORAL EVERY 6 HOURS PRN
Refills: 0 | Status: DISCONTINUED | OUTPATIENT
Start: 2025-04-09 | End: 2025-04-15

## 2025-04-09 RX ORDER — INDOMETHACIN 100 MG
SUPPOSITORY, RECTAL RECTAL
Status: DISPENSED
Start: 2025-04-09 | End: 2025-04-10

## 2025-04-09 RX ORDER — SODIUM CHLORIDE, SODIUM LACTATE, POTASSIUM CHLORIDE, CALCIUM CHLORIDE 600; 310; 30; 20 MG/100ML; MG/100ML; MG/100ML; MG/100ML
INJECTION, SOLUTION INTRAVENOUS CONTINUOUS PRN
Status: DISCONTINUED | OUTPATIENT
Start: 2025-04-09 | End: 2025-04-09 | Stop reason: SURG

## 2025-04-09 RX ORDER — SODIUM CHLORIDE 9 MG/ML
INJECTION, SOLUTION INTRAVENOUS ONCE
Status: COMPLETED | OUTPATIENT
Start: 2025-04-09 | End: 2025-04-09

## 2025-04-09 RX ORDER — INDOMETHACIN 50 MG/1
SUPPOSITORY RECTAL
Status: DISCONTINUED | OUTPATIENT
Start: 2025-04-09 | End: 2025-04-09 | Stop reason: HOSPADM

## 2025-04-09 RX ORDER — NALOXONE HYDROCHLORIDE 0.4 MG/ML
80 INJECTION, SOLUTION INTRAMUSCULAR; INTRAVENOUS; SUBCUTANEOUS AS NEEDED
Status: DISCONTINUED | OUTPATIENT
Start: 2025-04-09 | End: 2025-04-09 | Stop reason: HOSPADM

## 2025-04-09 RX ORDER — SODIUM CHLORIDE, SODIUM LACTATE, POTASSIUM CHLORIDE, CALCIUM CHLORIDE 600; 310; 30; 20 MG/100ML; MG/100ML; MG/100ML; MG/100ML
INJECTION, SOLUTION INTRAVENOUS CONTINUOUS
Status: DISCONTINUED | OUTPATIENT
Start: 2025-04-09 | End: 2025-04-10

## 2025-04-09 RX ORDER — HYDROCODONE BITARTRATE AND ACETAMINOPHEN 5; 325 MG/1; MG/1
1 TABLET ORAL EVERY 6 HOURS PRN
Refills: 0 | Status: DISCONTINUED | OUTPATIENT
Start: 2025-04-09 | End: 2025-04-09

## 2025-04-09 RX ORDER — LIDOCAINE HYDROCHLORIDE 10 MG/ML
INJECTION, SOLUTION EPIDURAL; INFILTRATION; INTRACAUDAL; PERINEURAL AS NEEDED
Status: DISCONTINUED | OUTPATIENT
Start: 2025-04-09 | End: 2025-04-09 | Stop reason: SURG

## 2025-04-09 RX ADMIN — LIDOCAINE HYDROCHLORIDE 50 MG: 10 INJECTION, SOLUTION EPIDURAL; INFILTRATION; INTRACAUDAL; PERINEURAL at 15:28:00

## 2025-04-09 RX ADMIN — SODIUM CHLORIDE, SODIUM LACTATE, POTASSIUM CHLORIDE, CALCIUM CHLORIDE: 600; 310; 30; 20 INJECTION, SOLUTION INTRAVENOUS at 15:20:00

## 2025-04-09 RX ADMIN — SODIUM CHLORIDE, SODIUM LACTATE, POTASSIUM CHLORIDE, CALCIUM CHLORIDE: 600; 310; 30; 20 INJECTION, SOLUTION INTRAVENOUS at 15:48:00

## 2025-04-09 NOTE — H&P
History & Physical Examination    Patient Name: Jared Nicole  MRN: [id number]  [facility]: [id number]  YOB: 1945    Diagnosis: Common bile duct dilatation [K83.8]      Present Illness:  Jared Nicole is a 79 year old male is here Common bile duct dilatation [K83.8].    Body mass index is 29.5 kg/m².    Past Medical History[1]    Procedure: ERCP    Physician Pre-Sedation Assessment    Pre-Sedation Assessment:    Sedation History: Airway Assessed    ASA Classification: 2. Patient with mild systemic disease    Cardiac:    Respiratory:    Abdomen:      Plan: MAC        Current Hospital Medications[2]    Allergies: Allergies[3]    Past Surgical History[4]  Family History[5]  Social History     Tobacco Use    Smoking status: Former     Current packs/day: 0.00     Average packs/day: 1 pack/day for 20.0 years (20.0 ttl pk-yrs)     Types: Cigarettes     Start date: 1962     Quit date: 1982     Years since quittin.2    Smokeless tobacco: Never   Substance Use Topics    Alcohol use: Yes     Alcohol/week: 0.0 - 2.0 standard drinks of alcohol       SYSTEM Check if Review is Normal Check if Physical Exam is Normal If not normal, please explain:   HEENT [x ] [x ]    NECK & BACK [x ] [x ]    HEART [x ] [x ]    LUNGS [x ] [x ]    ABDOMEN [x ] [x ]    UROGENITAL [ ] [ ]    EXTREMITIES [ ] [ ]    OTHER        [ x ] I have discussed the risks and benefits and alternatives with the patient/family.  They understand and agree to proceed with plan of care.  [ x ] I have reviewed the History and Physical done within the last 30 days.  Any changes noted above.    Yoan Cunha MD  2025  4:00 PM             [1]   Past Medical History:   Anemia    Arrhythmia    atrial fibrillation    Atrial fibrillation (HCC)    Cancer (HCC)    SKIN    CHF (congestive heart failure) (HCC)    mild     Colitis    DIVERTICULITIS    Colonic diverticular abscess    Congestive heart disease (HCC)    Hx musculoskletl dis NEC     BACK/DISC ISSUES    Long term (current) use of anticoagulants    Mitral regurgitation    Mild to moderate     Mitral valve prolapse    Monitoring for long-term anticoagulant use    Other and unspecified hyperlipidemia    Perforated bowel (HCC)    Prediabetes    SOB (shortness of breath)    Unspecified essential hypertension    Visual impairment   [2]   Current Facility-Administered Medications   Medication Dose Route Frequency    HYDROcodone-acetaminophen (Norco) 5-325 MG per tab 1 tablet  1 tablet Oral Q6H PRN    pantoprazole (Protonix) 40 mg in sodium chloride 0.9% PF 10 mL IV push  40 mg Intravenous Q24H    indomethacin (Indocin) 100 MG rectal suppository        indomethacin (Indocin) 50 MG rectal suppository    PRN    dilTIAZem ER (Dilacor XR) 24 hr cap 240 mg  240 mg Oral Daily    metoprolol tartrate (Lopressor) tab 50 mg  50 mg Oral 2x Daily(Beta Blocker)    tamsulosin (Flomax) cap 0.4 mg  0.4 mg Oral Daily    melatonin tab 3 mg  3 mg Oral Nightly PRN    ondansetron (Zofran) 4 MG/2ML injection 4 mg  4 mg Intravenous Q6H PRN    metoclopramide (Reglan) 5 mg/mL injection 5 mg  5 mg Intravenous Q8H PRN    sodium chloride 0.9% infusion   Intravenous Continuous    acetaminophen (Tylenol Extra Strength) tab 500 mg  500 mg Oral Q4H PRN    HYDROmorphone (Dilaudid) 1 MG/ML injection 0.2 mg  0.2 mg Intravenous Q2H PRN    Or    HYDROmorphone (Dilaudid) 1 MG/ML injection 0.4 mg  0.4 mg Intravenous Q2H PRN    Or    HYDROmorphone (Dilaudid) 1 MG/ML injection 0.8 mg  0.8 mg Intravenous Q2H PRN    polyethylene glycol (PEG 3350) (Miralax) 17 g oral packet 17 g  17 g Oral Daily PRN    sennosides (Senokot) tab 17.2 mg  17.2 mg Oral Nightly PRN    bisacodyl (Dulcolax) 10 MG rectal suppository 10 mg  10 mg Rectal Daily PRN    fleet enema (Fleet) rectal enema 133 mL  1 enema Rectal Once PRN     Facility-Administered Medications Ordered in Other Encounters   Medication Dose Route Frequency    propofol (Diprivan) 10 MG/ML injection    Intravenous PRN    propofol (Diprivan) 10 mg/mL infusion premix   Intravenous Continuous PRN    lidocaine PF (Xylocaine-MPF) 1% injection   Intravenous PRN    lactated ringers infusion   Intravenous Continuous PRN    fentaNYL (Sublimaze) 50 mcg/mL injection   Intravenous PRN    glucagon (GlucaGen) injection   Intravenous PRN   [3] No Known Allergies  [4]   Past Surgical History:  Procedure Laterality Date    Colectomy      Colon surgery      Colonoscopy  4/2016    No polyps/inflammation noted, moderate to severe diverticulosis. Fecal transplant completed.    Colostomy      with reversal 2012    Other surgical history Left 2001    eye surgery, detached retina    Other surgical history N/A 4/12/2016    Procedure: COLONOSCOPY W/FECAL MICROBIOTA TRANSPLANT;  Surgeon: Dave Valle MD;  Location:  ENDOSCOPY   [5]   Family History  Problem Relation Age of Onset    Lung Disorder Other         COPD, Family History of     Cancer Mother         Colon, Family History of

## 2025-04-09 NOTE — PAYOR COMM NOTE
--------------  ADMISSION REVIEW     Payor: [employer] PPO  Subscriber #:  [id number]  Authorization Number: [id number]    Admit date: 4/8/25  Admit time:  4:28 PM       REVIEW DOCUMENTATION:  ED Provider Notes signed by Ashvin Brar MD at 4/8/2025  3:49 PM       Author: Ashvin Brar MD Service: Emergency Medicine Author Type: Physician    Filed: 4/8/2025  3:49 PM Date of Service: 4/8/2025 11:54 AM Status: Signed     Patient Seen in: [facility] In [place]    History     Chief Complaint   Patient presents with    Abdomen/Flank Pain    Back Pain     Stated Complaint: injured back week ago, having abd. pain    The history is provided by the patient.   79-year-old male with history significant for atrial fibrillation on Coumadin (last dose last night) presents to the ER complaining of sudden severe right upper quadrant abdominal pain without nausea, vomiting, diarrhea, fevers, chills, chest pain, shortness of breath, urinary complaints.  Upper episodes.  Review of systems significant for right lumbar back pain that been going on for the past 4 to 5 days, started after heavy lifting, nonradiating, no lower extremity paresthesias weakness or saddle anesthesia.    Physical Exam     ED Triage Vitals   BP 04/08/25 1054 111/72   Pulse 04/08/25 1054 73   Resp 04/08/25 1054 18   Temp 04/08/25 1054 96.7 °F (35.9 °C)   Temp src 04/08/25 1054 Oral   SpO2 04/08/25 1054 96 %   O2 Device 04/08/25 1237 None (Room air)      Vital Signs  BP: 113/69  Pulse: 99  Resp: 18  Temp: 97 °F (36.1 °C)  Temp src: Oral    Oxygen Therapy  SpO2: 98 %  O2 Device: Nasal cannula  O2 Flow Rate (L/min): 2 L/min    Physical Exam  Vitals and nursing note reviewed.   Constitutional:       General: He is in acute distress.      Appearance: He is well-developed. He is not ill-appearing or toxic-appearing.      Comments: Patient is grimacing, threatening staff harm to staff if he does not get any analgesia   HENT:      Head:  Normocephalic and atraumatic.   Eyes:      Extraocular Movements: Extraocular movements intact.      Pupils: Pupils are equal, round, and reactive to light.   Cardiovascular:      Rate and Rhythm: Normal rate and regular rhythm.      Pulses: Normal pulses.      Heart sounds: Normal heart sounds. No murmur heard.     No gallop.   Pulmonary:      Effort: Pulmonary effort is normal. No respiratory distress.      Breath sounds: Normal breath sounds.   Abdominal:      General: Abdomen is flat.      Palpations: Abdomen is soft.      Tenderness: There is abdominal tenderness in the right upper quadrant, right lower quadrant and epigastric area.   Musculoskeletal:      Cervical back: Neck supple.   Skin:     General: Skin is warm and dry.      Capillary Refill: Capillary refill takes less than 2 seconds.   Neurological:      General: No focal deficit present.      Mental Status: He is alert and oriented to person, place, and time.   Psychiatric:         Mood and Affect: Mood normal.         Behavior: Behavior normal.     ED Course     Labs Reviewed   CBC WITH DIFFERENTIAL WITH PLATELET - Abnormal; Notable for the following components:       Result Value    WBC 11.3 (*)     Neutrophil Absolute Prelim 9.81 (*)     Neutrophil Absolute 9.81 (*)     Lymphocyte Absolute 0.96 (*)     All other components within normal limits   COMP METABOLIC PANEL (14) - Abnormal; Notable for the following components:    Glucose 143 (*)      (*)     ALT 54 (*)     Bilirubin, Total 2.1 (*)     All other components within normal limits   LIPASE - Abnormal; Notable for the following components:    Lipase 63 (*)     All other components within normal limits   PROTHROMBIN TIME (PT) - Abnormal; Notable for the following components:    PT 31.6 (*)     INR 3.12 (*)     All other components within normal limits   TROPONIN I HIGH SENSITIVITY - Normal     EKG  Rate, intervals and axes as noted on EKG Report.  Rate: 79,  Rhythm: Atrial  Fibrillation  Reading: Normal axis, right bundle branch block, nonspecific T wave changes in the limb leads    ED Course as of 04/08/25 1549  ------------------------------------------------------------  Time: 04/08 1430  Comment: I independently interpreted labs, INR appropriate elevated for Coumadin use.  Trivial/MILD derangement of his liver enzymes, T. bili and lipase.     US GALLBLADDER (CPT=76705)  Result Date: 4/8/2025  CONCLUSION:  The central common bile duct and pancreas were not able to be clearly identified due to overlying bowel gas.  There is known dilatation of the gallbladder and of the intra and extrahepatic bile ducts.  Again these structures could be best assessed with ERCP or MRCP.       CT ABDOMEN+PELVIS(CONTRAST ONLY)(CPT=74177)  Result Date: 4/8/2025  CONCLUSION:  1. Dilation of the common bile duct and gallbladder distension with luminal filling defect within the distal common bile duct.  This could represent calculus, sludge, versus neoplasm.  Recommend further evaluation with ERCP. 2. Ventral abdominal hernia containing unobstructed transverse colon. 3. Colonic diverticulosis. 4. Bibasilar interstitial opacities, which may reflect infectious/inflammatory process versus fibrotic lung disease.       CT SPINE LUMBAR (CPT=72131)  Result Date: 4/8/2025  CONCLUSION:  1. No acute lumbar spine fracture.  Chronic L1 compression fracture. 2. Scoliosis and degenerative changes of the spine with associated canal and neural foraminal stenosis as detailed.      [facility]     Admission disposition: 4/8/2025  2:34 PM    Medical Decision Making  Problems Addressed:  Common bile duct dilatation: undiagnosed new problem with uncertain prognosis     Details: Acute sudden severe right upper quadrant pain with signs of of common bile duct obstruction/dilation on CT.  No visible stones on CT or ultrasound.  Differential diagnosis includes pancreatic cancer, choledocholithiasis, [place], cholecystitis,  cholelithiasis, cholecystitis, cholangitis,    Amount and/or Complexity of Data Reviewed  Labs: ordered. Decision-making details documented in ED Course.  Radiology: ordered. Decision-making details documented in ED Course.  Discussion of management or test interpretation with external provider(s): Discussed case with gastroenterology, Dr. Jolley, planning for intervention while hospitalized    Disposition and Plan     Clinical Impression:  1. Common bile duct dilatation       Disposition:  Admit  4/8/2025  2:34 pm    Ashvin Brar MD on 4/8/2025  3:49 PM        GI PROGRESS NOTE   RECS FOR NOW:  - Admit to Hospitalist team w/ [facility] consult, follow labs + course. Anticipate arriving later today.  - Supportive care w/ IVF + IV abx.  - OK for regular diet at tolerated for now, CLD/FLD if not.  - Hold warfarin for now, may need to consider EUS +/- ERCP on Thursday if still required pending LFT trend + clinical course.  - Surgery consult in the meantime as well.  - Will reassess in the morning, can help plan/schedule procedure(s) if needed.     Marv Jolley MD      History and Physical   Chief Complaint: Abdominal pain    History of Present Illness:   79 year old male with atrial fibrillation, BPH, chronic heart failure preserved ejection fraction, hyperlipidemia presents to the emergency room with right upper quadrant pain that came on suddenly.  Sharp pain intermittent.  Patient denies any nausea or vomiting or diarrhea.  No fevers, chills.  No chest pain or shortness of breath or palpitations.  No hematochezia, melena, hematuria.  Patient denies looking jaundiced.      /69  Pulse 99  Temp 97 °F (36.1 °C)  Resp 18  Ht 5' 8\" (1.727 m)  Wt 194 lb (88 kg)  SpO2 98%  BMI 29.50 kg/m²       Lab 04/08/25  1121   RBC 4.99   HGB 15.4   HCT 45.4   MCV 91.0   MCH 30.9   MCHC 33.9   RDW 14.1   NEPRELIM 9.81*   WBC 11.3*   .0      *   BUN 15   CREATSERUM 1.13   EGFRCR 65   CA 9.1   ALB 4.3       K 4.1      CO2 24.0   ALKPHO 86   *   ALT 54*   BILT 2.1*   TP 7.2     TROPHS <3       Assessment & Plan:  # Right upper quadrant pain  - Imaging showing common bile duct dilatation  - Plan for ERCP  - GI on consult  - General Surgery consulted     # Chronic atrial fibrillation  - Will continue on metoprolol and Cardizem for rate control.  - Coumadin on hold.     # BPH  - Will continue on tamsulosin     # Hyperlipidemia  - Will continue on statin therapy.      4/9/2025  General Surgery   Reason for Consultation:  Cholelithiasis      Chief Complaint:  Abdominal pain     History of Present Illness:  Jared Nicole is a 79 year old male who presents to [facility] on 4/8/2025 with complaints of severe abdominal pain.     The patient states about 10 days ago, he began having back pain. He states he initially attributed the pain to a musculoskeletal etiology. He states he called his PCP, Dr. Neil, to set up an appointment regarding this complaint.      He states yesterday morning, he drank a cup of coffee and then developed severe abdominal pain. He was unable to get comfortable due to his pain. This was associated with nausea, but no vomiting.      Upon presentation to the hospital, CT scan of the abdomen and pelvis revealed mild gallbladder distension. The common bile duct is dilated up to 1.9 cm with mild intrahepatic biliary ductal dilation. There is a luminal filling defect within the distal common bile duct. He also has a ventral hernia containing unobstructed colon. All other significant findings may be seen in the radiologist report.      Follow up ultrasound revealed no evidence of gallstones, pericholecystic fluid or gallbladder wall thickening. The gallbladder is prominent. The common bile duct is enlarged measuring up to 19 mm. There is probable intrahepatic biliary ductal dilation. A discrete obstructing lesion is not able to be clearly visualized. All other significant findings may  be seen in the radiologist report.     The patient's vital signs are stable.  Leukocytosis (15.6) with left shift.  Hemoglobin 14.0.  Platelets 170,000.  Hyperglycemic (159).  Creatinine elevated at 1.34.  LFTs and total bilirubin increased today compared to yesterday.  AST, ALT and total bilirubin all elevated at 215, 224 and 5.5 respectively.  PT/INR pending     GI is on consult.  Per the patient's nurse, ERCP would be tomorrow at the earliest.     Positive for nausea and abdominal pain. Negative for vomiting, diarrhea, constipation and abdominal distention.     /53 (BP Location: Left arm)  Pulse 69  Temp 98.1 °F (36.7 °C) (Oral)  Resp 18  Ht 68\"  Wt 194 lb (88 kg)  SpO2 95%  BMI 29.50 kg/m²     Lab 04/08/25  1121 04/09/25  0540   RBC 4.99 4.51   HGB 15.4 14.0   HCT 45.4 41.3   MCV 91.0 91.6   MCH 30.9 31.0   MCHC 33.9 33.9   RDW 14.1 14.5   NEPRELIM 9.81* 13.36*   WBC 11.3* 15.6*   .0 170.0     * 159*   BUN 15 15   CREATSERUM 1.13 1.34*   CA 9.1 9.1   ALB 4.3 4.1    139   K 4.1 4.4    105   CO2 24.0 25.0   ALKPHO 86 106   * 215*   ALT 54* 224*   BILT 2.1* 5.5*   TP 7.2 6.6     Lab 04/08/25  1121 04/08/25  1804   PTP 31.6* 33.9*   INR 3.12* 3.32*       Impression:  Cholelithiasis  Common bile duct dilation  A-fib on Coumadin  Transaminitis  Hyperbilirubinemia     Plan:  The patient's plan was discussed with Dr. Hitchcock.  She is recommending a laparoscopic cholecystectomy.  Surgical timing to be determined pending timing of ERCP  The patient may have a diet today from a general surgical standpoint.  N.p.o. at midnight for possible ERCP tomorrow  Antiemetics and analgesics as needed  Encourage ambulation and up to chair  Medical management per primary  Appreciate GI recommendations  Continue to trend LFTs and total bilirubin  GI prophylaxis with Protonix  DVT prophylaxis with SCDs.  Okay for Lovenox from a general surgical standpoint      GI Consult  HPI: 79M w/ CHF + Afib  on chronic a/c (warfarin), presenting w/ RUQ pain + elevated WBC + elevated LFTs, CT suspicious for CBD stone +/- early cholangitis. Last dose of warfarin was last evening PTA (Monday).     RUQ pain better this morning, but still rating at 3-4/10 intermittently (present at time of exam, tender in RUQ on palpation). Non-toxic appearing, AFVSS overnight despite WBC + LFT incr since last checks.     Discussed ERCP + RBA's today, pt amenable to proceed in light of chronic a/c. Will need IV VitK + FFP prior to procedure for correction given urgent need for ERCP today.    Last solid intake ~0800 this morning, strict NPO at time of my visit.    ASSESSMENT AND PLAN:   #Elevated LFTs + WBC (increased since admission)  #Choledocholithiasis, suspected early ascending cholangitis  #Abnormal CT of abdomen  #Chronic anticoagulation (warfarin held, still requires correction)     Plan for ERCP later today w/ Int-GI (Guerline), discussed case at length this morning. RBA's reviewed w/ pt at bedside.  NPO for now.  VitK 3mg IV x1 now.  2U FFP in 1-2hrs to start brisk INR correction, have 2U on hold for procedure.  Check INR 1hr prior to procedure (1400, anticipate ERCP 7617-3149 today).  D/w'd RN + Hospitalist, RN will update orders accordingly.  Will continue to follow w/ you.      MEDICATIONS ADMINISTERED:  Transfuse fresh frozen plasma       Date Action Dose Route User    4/9/2025 1147 New Bag (none) Intravenous Deborah Yi RN          atorvastatin (Lipitor) tab 10 mg       Date Action Dose Route User    4/9/2025 0846 Given 10 mg Oral Deborah Yi RN          dilTIAZem ER (Dilacor XR) 24 hr cap 240 mg       Date Action Dose Route User    4/9/2025 0846 Given 240 mg Oral Deborah Yi RN          HYDROcodone-acetaminophen (Norco) 5-325 MG per tab 1 tablet       Date Action Dose Route User    4/9/2025 0656 Given 1 tablet Oral Aviva Pierre RN          HYDROmorphone (Dilaudid) 1 MG/ML injection  0.5 mg       Date Action Dose Route User    4/8/2025 1158 Given 0.5 mg Intravenous Virgilio Elizalde RN          HYDROmorphone (Dilaudid) 1 MG/ML injection 0.75 mg       Date Action Dose Route User    4/8/2025 1521 Given 0.75 mg Intravenous Virgilio Elizalde RN          HYDROmorphone (Dilaudid) 1 MG/ML injection 0.8 mg       Date Action Dose Route User    4/8/2025 1719 Given 0.8 mg Intravenous Deborah Yi RN          iopamidol 76% (ISOVUE-370) injection for power injector       Date Action Dose Route User    4/8/2025 1321 Given 100 mL Intravenous (Left Antecubital) Hailey Deal          metoprolol tartrate (Lopressor) tab 50 mg       Date Action Dose Route User    4/9/2025 0534 Given 50 mg Oral Aviva Pierre RN    4/8/2025 1832 Given 50 mg Oral Deborah Yi RN          pantoprazole (Protonix) 40 mg in sodium chloride 0.9% PF 10 mL IV push       Date Action Dose Route User    4/9/2025 0929 Given 40 mg Intravenous Deborah Yi RN          phytonadione (Aqua-Mephton) 3 mg in sodium chloride 0.9% 50 mL IVPB       Date Action Dose Route User    4/9/2025 1010 New Bag 3 mg Intravenous Deborah Yi RN          sodium chloride 0.9% infusion       Date Action Dose Route User    4/8/2025 1719 New Bag (none) Intravenous Deborah Yi RN          sodium chloride 0.9% infusion       Date Action Dose Route User    4/9/2025 1146 New Bag (none) Intravenous Deborah Yi RN          tamsulosin (Flomax) cap 0.4 mg       Date Action Dose Route User    4/9/2025 0846 Given 0.4 mg Oral Deborah Yi RN     Vitals (last day)       Date/Time Temp Pulse Resp BP SpO2 Weight O2 Device O2 Flow Rate (L/min) [facility]    04/09/25 1145 97.8 °F (36.6 °C) 77 18 132/60 96 % -- -- -- AS    04/09/25 1130 -- 88 -- -- 96 % -- -- -- AS    04/09/25 1115 -- 79 -- -- 96 % -- -- -- AS    04/09/25 1105 98 °F (36.7 °C) 80 18 103/62 97 % -- Nasal cannula 2 L/min IS    04/09/25 1100 --  78 -- 103/62 96 % -- -- -- AS    04/09/25 1045 -- 88 -- -- 96 % -- -- -- AS    04/09/25 0848 97.8 °F (36.6 °C) 69 18 103/70 96 % -- Nasal cannula 2 L/min IS    04/09/25 0425 98.1 °F (36.7 °C) 69 18 114/53 95 % -- Nasal cannula 2 L/min NJ    04/09/25 0000 98.3 °F (36.8 °C) 84 18 122/67 96 % -- Nasal cannula -- NJ    04/08/25 2050 97.8 °F (36.6 °C) 90 16 114/63 96 % -- Nasal cannula 2 L/min ED    04/08/25 1654 97.8 °F (36.6 °C) 90 16 131/75 96 % -- Nasal cannula 2 L/min IP    04/08/25 1643 -- -- -- -- -- 194 lb (88 kg) -- -- AS    04/08/25 1543 -- 99 18 113/69 98 % -- Nasal cannula 2 L/min JJ    04/08/25 1515 97 °F (36.1 °C) 84 16 106/60 -- -- -- -- HG    04/08/25 1514 97 °F (36.1 °C) 84 16 106/60 95 % -- Nasal cannula 1 L/min HG    04/08/25 1240 -- 82 16 108/64 94 % -- Nasal cannula 1 L/min HG    04/08/25 1238 -- -- -- -- 94 % -- Nasal cannula 1 L/min HG    04/08/25 1237 -- -- -- -- 88 % -- None (Room air) -- HG    04/08/25 1054 96.7 °F (35.9 °C) 73 18 111/72 96 % 194 lb (88 kg) -- -- SC       Blood Transfusion Record       Product Unit Status Volume Start End            Transfuse fresh frozen plasma       25  [id number]  [id number] Transfusing  04/09/25 1147              FOR REVIEW/APPROVAL OF INPT ADMISSION

## 2025-04-09 NOTE — OPERATIVE REPORT
[facility] OPERATIVE REPORT   PATIENT NAME: Jared Nicole  MRN: [id number]  DATE OF OPERATION: 4/9/2025  PREOPERATIVE DIAGNOSIS: acute cholangitis with elevated liver tests, leukocytosis; dilated CBD with stone; hypercoagulable state due to recent coumadin with INR 4.    POSTOPERATIVE DIAGNOSIS:    1.  Periampullary diverticulum with buried papilla   2.  Selective CBD cannulation   3,  markedly dilated CBD with large stones   PROCEDURE PERFORMED: Endoscopic Retrograde Cholangiopancreatoscopy with biliary stent placement  SEDATION MEDICATIONS: MAC  PREOPERATIVE MEDS:  Levofloxacin 500 mg IVPB;  500cc lactated Ringer's solution IV  INTRAPROCEDURE MEDS: indomethacin 100 mg Per rectum  PREPROCEDURE ASSESSMENT: The indication for this procedure is to assess for cholangitis. The patient was identified by myself and nursing staff in the exam room. Informed consent was obtained. The patient was seen in clinic and a full H&P was obtained. On brief physical examination, airway is patent. Chest is clear. Heart has regular rate and rhythm. Abdomen is soft, nontender with good bowel sounds. A medication list was taken by nursing today and reviewed by myself. The patient is an ASA grade 2.   PROCEDURE NOTE: The procedure was completed without difficulty. The patient tolerated the procedure well.   The side-viewing duodenoscope was introduced into the esophagus and advanced to the 2nd portion.  Ampulla was visualized and appeared normal but was buried in diverticulum.  Biliary cannulation was attempted to be achieved with autotome 44 sphincterotome with preloaded with 035\" guidewire. CBD was dilated to 15mm with large 13mm stones x 3.  Due to elevated INR, a 8.5fr x 9cm biliary stent was placed with excellent biliary drainage.  No pus was seen.  No PD cannulation noted. Scope was withdrawn from the patient and patient tolerated the procedure well.  FINDINGS   CBD stones  RECOMMENDATIONS: repeat ERCP in 3-4 weeks off coumadin  to safely perform sphincterotomy and stone extraction; advance diet as tolerated. Resume anticoagulation  DISCHARGE:  On discharge, the patient was given an after-visit summary detailing the procedure, findings, followup plans, and an updated medication list.     Thank you very much for the consultation.  I really appreciate it.    Yoan Cunha MD

## 2025-04-09 NOTE — PROGRESS NOTES
A&Ox4. VSS. RA. .  Telemetry: A. Fib   GI: Abdomen soft, nondistended. Passing gas.  Denies nausea.  : Voids.  Pain controlled with PRN pain medications  Up with standby assist.  Diet: NPO for procedure later today   IVF running per order.  All appropriate safety measures in place. All questions and concerns addressed.       0945- Vitamin K and FFP ordered per Dr. Jolley  1145- Vit K done infusing, first unit of FFP started   1245- First unit of FFP complete, waiting on second unit to arrive   1300- Second unit of FFP started   1400- Second unit of FFP complete, taken to [facility] by transport   1629- Pt back on floor from [facility]

## 2025-04-09 NOTE — ANESTHESIA PREPROCEDURE EVALUATION
PRE-OP EVALUATION    Patient Name: Jared Nicole    Admit Diagnosis: Common bile duct dilatation [K83.8]    Pre-op Diagnosis: Choledocholithiasis    ENDOSCOPIC ULTRASOUND (EUS),   POSSIBLE ENDOSCOPIC RETROGRADE CHOLANGIOPANCREATOGRAPHY (ERCP)    Anesthesia Procedure: XR ENDO BILE DUCT (CPT=74328)  ENDOSCOPIC ULTRASOUND (EUS),  POSSIBLE ENDOSCOPIC RETROGRADE CHOLANGIOPANCREATOGRAPHY (ERCP)  ENDOSCOPIC RETROGRADE CHOLANGIOPANCREATOGRAPHY (ERCP)    Surgeons and Role:     * Yoan Cunha MD - Primary    Pre-op vitals reviewed.  Temp: 98.1 °F (36.7 °C)  Pulse: 97  Resp: 16  BP: 136/82  SpO2: 92 %  Body mass index is 29.5 kg/m².    Current medications reviewed.  Hospital Medications:  Current Medications[1]    Outpatient Medications:   Prescriptions Prior to Admission[2]    Allergies: Patient has no known allergies.      Anesthesia Evaluation    Patient summary reviewed.    Anesthetic Complications  (-) history of anesthetic complications         GI/Hepatic/Renal      (-) GERD                           Cardiovascular        Exercise tolerance: good     MET: >4    (-) obesity  (+) hypertension     (-) CAD             (+) dysrhythmias and atrial fibrillation  (+) CHF  (-) angina     (-) HERRERA         Endo/Other      (-) diabetes                            Pulmonary      (-) asthma  (+) COPD       (+) shortness of breath     (-) sleep apnea       Neuro/Psych                              Patient Active Problem List:     Chronic systolic congestive heart failure (HCC)     Mixed hyperlipidemia     Mitral regurgitation     Prediabetes     COPD (chronic obstructive pulmonary disease) (HCC)     Monitoring for long-term anticoagulant use     Permanent atrial fibrillation (HCC)     Aortic atherosclerosis     Aortic valve sclerosis     Benign prostatic hyperplasia with urinary hesitancy     Primary hypertension     Common bile duct dilatation            Past Surgical History[3]  Social Hx on file[4]  History   Drug Use No      Available pre-op labs reviewed.  Lab Results   Component Value Date    WBC 15.6 (H) 04/09/2025    RBC 4.51 04/09/2025    HGB 14.0 04/09/2025    HCT 41.3 04/09/2025    MCV 91.6 04/09/2025    MCH 31.0 04/09/2025    MCHC 33.9 04/09/2025    RDW 14.5 04/09/2025    .0 04/09/2025     Lab Results   Component Value Date     04/09/2025    K 4.4 04/09/2025     04/09/2025    CO2 25.0 04/09/2025    BUN 15 04/09/2025    CREATSERUM 1.34 (H) 04/09/2025     (H) 04/09/2025    CA 9.1 04/09/2025     Lab Results   Component Value Date    INR 4.08 (H) 04/09/2025         Airway      Mallampati: II  Mouth opening: >3 FB  TM distance: 4 - 6 cm  Neck ROM: full Cardiovascular    Cardiovascular exam normal.  Rhythm: regular  Rate: normal     Dental  Comment: Patient denies any loose/missing/cracked teeth. No gross abnormalities or loose teeth noted on exam.      Dentition appears grossly intact         Pulmonary    Pulmonary exam normal.                 Other findings              ASA: 3   Plan: MAC  NPO status verified and patient meets guidelines.    Post-procedure pain management plan discussed with surgeon and patient.    Comment:     A detailed discussion about the anesthetic plan was held with Jared Nicole in the preoperative area. Benefits and risks of MAC anesthesia were discussed, including intraoperative awareness/recall, PONV, reasonable expectations of post-operative pain/discomfort, aspiration, conversion to general anesthesia, dental injury, pressure/nerve injuries from positioning, and other serious but rare complications (life-threatening cardiopulmonary events). All questions were answered appropriately and patient demonstrated understanding of realistic expectations and risks of undergoing anesthesia. Jared Nicole consents to receiving anesthesia and wishes to proceed.  Plan/risks discussed with: patient                Present on Admission:   Common bile duct dilatation   Mixed  hyperlipidemia   Permanent atrial fibrillation (HCC)   Benign prostatic hyperplasia with urinary hesitancy   Primary hypertension             [1]    HYDROcodone-acetaminophen (Norco) 5-325 MG per tab 1 tablet  1 tablet Oral Q6H PRN    pantoprazole (Protonix) 40 mg in sodium chloride 0.9% PF 10 mL IV push  40 mg Intravenous Q24H    [COMPLETED] phytonadione (Aqua-Mephton) 3 mg in sodium chloride 0.9% 50 mL IVPB  3 mg Intravenous Once    [COMPLETED] sodium chloride 0.9% infusion   Intravenous Once    [COMPLETED] HYDROmorphone (Dilaudid) 1 MG/ML injection 0.5 mg  0.5 mg Intravenous Once    [COMPLETED] iopamidol 76% (ISOVUE-370) injection for power injector  100 mL Intravenous ONCE PRN    [COMPLETED] HYDROmorphone (Dilaudid) 1 MG/ML injection 0.75 mg  0.75 mg Intravenous Once    dilTIAZem ER (Dilacor XR) 24 hr cap 240 mg  240 mg Oral Daily    metoprolol tartrate (Lopressor) tab 50 mg  50 mg Oral 2x Daily(Beta Blocker)    tamsulosin (Flomax) cap 0.4 mg  0.4 mg Oral Daily    melatonin tab 3 mg  3 mg Oral Nightly PRN    ondansetron (Zofran) 4 MG/2ML injection 4 mg  4 mg Intravenous Q6H PRN    metoclopramide (Reglan) 5 mg/mL injection 5 mg  5 mg Intravenous Q8H PRN    sodium chloride 0.9% infusion   Intravenous Continuous    acetaminophen (Tylenol Extra Strength) tab 500 mg  500 mg Oral Q4H PRN    HYDROmorphone (Dilaudid) 1 MG/ML injection 0.2 mg  0.2 mg Intravenous Q2H PRN    Or    HYDROmorphone (Dilaudid) 1 MG/ML injection 0.4 mg  0.4 mg Intravenous Q2H PRN    Or    HYDROmorphone (Dilaudid) 1 MG/ML injection 0.8 mg  0.8 mg Intravenous Q2H PRN    polyethylene glycol (PEG 3350) (Miralax) 17 g oral packet 17 g  17 g Oral Daily PRN    sennosides (Senokot) tab 17.2 mg  17.2 mg Oral Nightly PRN    bisacodyl (Dulcolax) 10 MG rectal suppository 10 mg  10 mg Rectal Daily PRN    fleet enema (Fleet) rectal enema 133 mL  1 enema Rectal Once PRN   [2]   Medications Prior to Admission   Medication Sig Dispense Refill Last Dose/Taking     DILTIAZEM  MG Oral Capsule SR 24 Hr TAKE 1 CAPSULE EVERY DAY 90 capsule 3 Taking    ATORVASTATIN 10 MG Oral Tab TAKE 1 TABLET EVERY DAY 90 tablet 3 Taking    FUROSEMIDE 20 MG Oral Tab TAKE 1 TABLET EVERY DAY 90 tablet 3 Taking    METOPROLOL TARTRATE 50 MG Oral Tab TAKE 1 TABLET TWICE DAILY 180 tablet 3     TAMSULOSIN 0.4 MG Oral Cap TAKE 1 CAPSULE EVERY DAY 90 capsule 3     warfarin 2.5 MG Oral Tab TAKE ONE TABLET BY MOUTH 2 DAYS WEEKLY OR AS DIRECTED BY ANTICOAGULATION CLINIC. 30 tablet 3     warfarin 5 MG Oral Tab TAKE ONE TABLET BY MOUTH 5 DAYS WEEKLY OR AS DIRECTED BY ANTICOAGULATION CLINIC. 65 tablet 3     ascorbic acid (VITAMIN C) 500 MG Oral Tab Take 1 tablet (500 mg total) by mouth daily.       Cholecalciferol (VITAMIN D) 1000 UNITS Oral Cap Take 1 Cap by mouth daily.      [3]   Past Surgical History:  Procedure Laterality Date    Colectomy      Colon surgery      Colonoscopy  2016    No polyps/inflammation noted, moderate to severe diverticulosis. Fecal transplant completed.    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

## 2025-04-09 NOTE — CONSULTS
[facility]  Report of Consultation    Jared Nicole Patient Status:  Inpatient    1945 MRN [id number]   Location [facility] 3NW-A Attending Trent Linn,    Hosp Day # 1 PCP Eric Neil MD     Requesting Physician:  Dr. Jolley    Reason for Consultation:  Cholelithiasis     Chief Complaint:  Abdominal pain    History of Present Illness:  Jared Nicole is a 79 year old male who presents to [facility] on 2025 with complaints of severe abdominal pain.    The patient states about 10 days ago, he began having back pain. He states he initially attributed the pain to a musculoskeletal etiology. He states he called his PCP, Dr. Neil, to set up an appointment regarding this complaint.     He states yesterday morning, he drank a cup of coffee and then developed severe abdominal pain. He was unable to get comfortable due to his pain. This was associated with nausea, but no vomiting.     Upon presentation to the hospital, CT scan of the abdomen and pelvis revealed mild gallbladder distension. The common bile duct is dilated up to 1.9 cm with mild intrahepatic biliary ductal dilation. There is a luminal filling defect within the distal common bile duct. He also has a ventral hernia containing unobstructed colon. All other significant findings may be seen in the radiologist report.     Follow up ultrasound revealed no evidence of gallstones, pericholecystic fluid or gallbladder wall thickening. The gallbladder is prominent. The common bile duct is enlarged measuring up to 19 mm. There is probable intrahepatic biliary ductal dilation. A discrete obstructing lesion is not able to be clearly visualized. All other significant findings may be seen in the radiologist report.    The patient's vital signs are stable.  Leukocytosis (15.6) with left shift.  Hemoglobin 14.0.  Platelets 170,000.  Hyperglycemic (159).  Creatinine elevated at 1.34.  LFTs and total bilirubin increased today compared to  yesterday.  AST, ALT and total bilirubin all elevated at 215, 224 and 5.5 respectively.  PT/INR pending    GI is on consult.  Per the patient's nurse, ERCP would be tomorrow at the earliest.    Past medical history hypertension, hyperlipidemia, a. Fib on coumadin, heart failure, prediabetes and COPD.     Past abdominal surgical history significant for a Ezequiel procedure in [place] over 10 years ago. He had his ostomy reversed 6 months later.     History:  Past Medical History[1]  Past Surgical History[2]  Family History[3]   reports that he quit smoking about 43 years ago. His smoking use included cigarettes. He started smoking about 63 years ago. He has a 20 pack-year smoking history. He has never used smokeless tobacco. He reports current alcohol use. He reports that he does not use drugs.    Allergies:  Allergies[4]    Medications:  Prescriptions Prior to Admission[5]    Current Hospital Medications[6]    Review of Systems:  Review of Systems   Constitutional:  Negative for chills and fever.   Gastrointestinal:  Positive for nausea and abdominal pain. Negative for vomiting, diarrhea, constipation and abdominal distention.   All other systems reviewed and are negative.      Physical Exam:  /53 (BP Location: Left arm)   Pulse 69   Temp 98.1 °F (36.7 °C) (Oral)   Resp 18   Ht 68\"   Wt 194 lb (88 kg)   SpO2 95%   BMI 29.50 kg/m²   Physical Exam  Vitals and nursing note reviewed.   Constitutional:       Appearance: Normal appearance.   HENT:      Head: Normocephalic and atraumatic.      Right Ear: External ear normal.      Left Ear: External ear normal.      Nose: Nose normal.      Comments: O2 via NC  Eyes:      General: No scleral icterus.     Conjunctiva/sclera: Conjunctivae normal.   Pulmonary:      Effort: Pulmonary effort is normal. No respiratory distress.   Abdominal:      General: Abdomen is flat. A surgical scar is present.      Palpations: Abdomen is soft.      Tenderness: There is abdominal  tenderness in the right upper quadrant.      Hernia: A hernia (easily reducible at the superior aspect of his midline incision) is present. Hernia is present in the ventral area.       Musculoskeletal:      Cervical back: Normal range of motion.   Skin:     General: Skin is warm and dry.   Neurological:      Mental Status: He is alert.   Psychiatric:         Mood and Affect: Mood normal.         Behavior: Behavior normal.         Judgment: Judgment normal.       Laboratory Data:  Recent Labs   Lab 04/08/25  1121 04/09/25  0540   RBC 4.99 4.51   HGB 15.4 14.0   HCT 45.4 41.3   MCV 91.0 91.6   MCH 30.9 31.0   MCHC 33.9 33.9   RDW 14.1 14.5   NEPRELIM 9.81* 13.36*   WBC 11.3* 15.6*   .0 170.0       Recent Labs   Lab 04/08/25  1121 04/09/25  0540   * 159*   BUN 15 15   CREATSERUM 1.13 1.34*   CA 9.1 9.1   ALB 4.3 4.1    139   K 4.1 4.4    105   CO2 24.0 25.0   ALKPHO 86 106   * 215*   ALT 54* 224*   BILT 2.1* 5.5*   TP 7.2 6.6         Recent Labs   Lab 04/08/25 1121 04/08/25  1804   PTP 31.6* 33.9*   INR 3.12* 3.32*         US GALLBLADDER (CPT=76705)  Result Date: 4/8/2025  CONCLUSION:  The central common bile duct and pancreas were not able to be clearly identified due to overlying bowel gas.  There is known dilatation of the gallbladder and of the intra and extrahepatic bile ducts.  Again these structures could be best assessed with ERCP or MRCP.   LOCATION:  [id number]    Dictated by (CST): Alfredo Daniels MD on 4/08/2025 at 3:22 PM     Finalized by (CST): Alfredo Daniels MD on 4/08/2025 at 3:25 PM       CT ABDOMEN+PELVIS(CONTRAST ONLY)(CPT=74177)  Result Date: 4/8/2025  CONCLUSION:  1. Dilation of the common bile duct and gallbladder distension with luminal filling defect within the distal common bile duct.  This could represent calculus, sludge, versus neoplasm.  Recommend further evaluation with ERCP. 2. Ventral abdominal hernia containing unobstructed transverse colon. 3. Colonic  diverticulosis. 4. Bibasilar interstitial opacities, which may reflect infectious/inflammatory process versus fibrotic lung disease.   LOCATION:  [id number]   Dictated by (CST): Darron Segura MD on 4/08/2025 at 1:39 PM     Finalized by (CST): Darron Segura MD on 4/08/2025 at 1:44 PM       CT SPINE LUMBAR (CPT=72131)  Result Date: 4/8/2025  CONCLUSION:  1. No acute lumbar spine fracture.  Chronic L1 compression fracture. 2. Scoliosis and degenerative changes of the spine with associated canal and neural foraminal stenosis as detailed.   LOCATION:  [id number]   Dictated by (CST): Darron Segura MD on 4/08/2025 at 1:32 PM     Finalized by (CST): Darron Segura MD on 4/08/2025 at 1:36 PM             Medical Decision Making         Impression:  Problem List[7]  Cholelithiasis  Common bile duct dilation  A-fib on Coumadin  Transaminitis  Hyperbilirubinemia    Plan:  The patient's plan was discussed with Dr. Hitchcock.  She is recommending a laparoscopic cholecystectomy.  Surgical timing to be determined pending timing of ERCP  The patient may have a diet today from a general surgical standpoint.  N.p.o. at midnight for possible ERCP tomorrow  Antiemetics and analgesics as needed  Encourage ambulation and up to chair  Medical management per primary  Appreciate GI recommendations  Continue to trend LFTs and total bilirubin  GI prophylaxis with Protonix  DVT prophylaxis with SCDs.  Okay for Lovenox from a general surgical standpoint    Mikayla Rock PA-C  4/9/2025  8:26 AM    Is this a shared or split note between Advanced Practice Provider and Physician? Yes        The patient was independently interviewed and examined by myself.  More than 50% of clinical time and 100% MDM was performed by myself.   I personally performed the services described in this documentation and I agree with the assessment and plan as set forth above and discussed with the physician assistant.       79-year-old gentleman who presents to the  emergency department with 2-week history of abdominal pain.  The patient reports that the abdominal pain is primarily in the epigastrium and radiates to his back.  Initially he thought that his symptoms may be related to a back injury however yesterday after having coffee the patient's abdominal pain became severe and he experienced severe nausea with no emesis.    Workup in the emergency department revealed a leukocytosis of 11.3, this morning 15.6, hemoglobin and platelet count within normal limits.  The patient's initial INR was 3.12, today 4.08.  The patient is on Coumadin.  LFTs initially elevated with SGOT 105 now 215, SGPT 54 now to 24, total bilirubin 2.1 now 5.5.  Initial creatinine within normal limits now 1.34.  CT scan of the abdomen and pelvis reveals dilated CBD up to 19 mm with mild intrahepatic ductal dilatation.  There is a luminal filling defect within the distal CBD.  Ventral hernia with nonobstructed transverse colon is noted.  Follow-up ultrasound confirms CBD dilatation however the central and distal CBD and pancreas were not visualized.    Past medical-surgical history-perforated diverticulitis with Ezequiel procedure and subsequent reversal of colostomy 6 months later by Dr. Dias over 10 years ago, hypertension, A-fib on Coumadin, CHF, prediabetes, COPD    On physical examination the patient's abdomen is soft, mildly tender to deep palpation in the epigastrium.  No evidence of guarding, rebound or percussion tenderness.  Well-healed infraumbilical and left lower quadrant incision.  Supraumbilical ventral incision is noted.  This is easily reducible with no evidence of incarceration.    Treatment options were reviewed in detail with the patient.  At this time the recommendation is to proceed with EUS-ERCP per GI service.  As the patient's INR remains elevated, this procedure is scheduled for tomorrow.  Further recommendations will be pending GI workup.    Jaden is comfortable with this  treatment plan.  He has no further questions at this time and will proceed as discussed  We did discuss subsequent outpatient elective repair of the ventral hernia.    TRINH Hitchcock MD, FACS    Please note that this report has been produced using speech recognition software and may contain errors related to that system including but not limited to errors in grammar, punctuation and spelling as well as words and phrases that possibly may have been recognized inappropriately.  If there are any questions or concerns please contact the dictating provider for clarification.  The 21st Century Cures Act makes medical notes like these available to patients in the interest of trans parency. Please be advised this is a medical document. Medical documents are intended to carry relevant information, facts as evident, and the clinical opinion of the practitioner. The medical note is intended as peer to peer communication and may appear blunt or direct. It is written in medical language and may contain abbreviations or verbiage that are unfamiliar.  If there are any questions or concerns please contact the dictating provider for clarification.                                   [1]   Past Medical History:   Anemia    Arrhythmia    atrial fibrillation    Atrial fibrillation (HCC)    Cancer (HCC)    SKIN    CHF (congestive heart failure) (HCC)    mild     Colitis    DIVERTICULITIS    Colonic diverticular abscess    Congestive heart disease (HCC)    Hx musculoskletl dis NEC    BACK/DISC ISSUES    Long term (current) use of anticoagulants    Mitral regurgitation    Mild to moderate     Mitral valve prolapse    Monitoring for long-term anticoagulant use    Other and unspecified hyperlipidemia    Perforated bowel (HCC)    Prediabetes    SOB (shortness of breath)    Unspecified essential hypertension    Visual impairment   [2]   Past Surgical History:  Procedure Laterality Date    Colectomy      Colon surgery      Colonoscopy  4/2016    No  polyps/inflammation noted, moderate to severe diverticulosis. Fecal transplant completed.    Colostomy      with reversal 2012    Other surgical history Left 2001    eye surgery, detached retina    Other surgical history N/A 4/12/2016    Procedure: COLONOSCOPY W/FECAL MICROBIOTA TRANSPLANT;  Surgeon: Dave Valle MD;  Location:  ENDOSCOPY   [3]   Family History  Problem Relation Age of Onset    Lung Disorder Other         COPD, Family History of     Cancer Mother         Colon, Family History of    [4] No Known Allergies  [5]   Medications Prior to Admission   Medication Sig    DILTIAZEM  MG Oral Capsule SR 24 Hr TAKE 1 CAPSULE EVERY DAY    ATORVASTATIN 10 MG Oral Tab TAKE 1 TABLET EVERY DAY    FUROSEMIDE 20 MG Oral Tab TAKE 1 TABLET EVERY DAY    METOPROLOL TARTRATE 50 MG Oral Tab TAKE 1 TABLET TWICE DAILY    TAMSULOSIN 0.4 MG Oral Cap TAKE 1 CAPSULE EVERY DAY    warfarin 2.5 MG Oral Tab TAKE ONE TABLET BY MOUTH 2 DAYS WEEKLY OR AS DIRECTED BY ANTICOAGULATION CLINIC.    warfarin 5 MG Oral Tab TAKE ONE TABLET BY MOUTH 5 DAYS WEEKLY OR AS DIRECTED BY ANTICOAGULATION CLINIC.    ascorbic acid (VITAMIN C) 500 MG Oral Tab Take 1 tablet (500 mg total) by mouth daily.    Cholecalciferol (VITAMIN D) 1000 UNITS Oral Cap Take 1 Cap by mouth daily.   [6]   Current Facility-Administered Medications:     HYDROcodone-acetaminophen (Norco) 5-325 MG per tab 1 tablet, 1 tablet, Oral, Q6H PRN    atorvastatin (Lipitor) tab 10 mg, 10 mg, Oral, Daily    dilTIAZem ER (Dilacor XR) 24 hr cap 240 mg, 240 mg, Oral, Daily    metoprolol tartrate (Lopressor) tab 50 mg, 50 mg, Oral, 2x Daily(Beta Blocker)    tamsulosin (Flomax) cap 0.4 mg, 0.4 mg, Oral, Daily    melatonin tab 3 mg, 3 mg, Oral, Nightly PRN    ondansetron (Zofran) 4 MG/2ML injection 4 mg, 4 mg, Intravenous, Q6H PRN    metoclopramide (Reglan) 5 mg/mL injection 5 mg, 5 mg, Intravenous, Q8H PRN    sodium chloride 0.9% infusion, , Intravenous, Continuous    acetaminophen  (Tylenol Extra Strength) tab 500 mg, 500 mg, Oral, Q4H PRN    HYDROmorphone (Dilaudid) 1 MG/ML injection 0.2 mg, 0.2 mg, Intravenous, Q2H PRN **OR** HYDROmorphone (Dilaudid) 1 MG/ML injection 0.4 mg, 0.4 mg, Intravenous, Q2H PRN **OR** HYDROmorphone (Dilaudid) 1 MG/ML injection 0.8 mg, 0.8 mg, Intravenous, Q2H PRN    polyethylene glycol (PEG 3350) (Miralax) 17 g oral packet 17 g, 17 g, Oral, Daily PRN    sennosides (Senokot) tab 17.2 mg, 17.2 mg, Oral, Nightly PRN    bisacodyl (Dulcolax) 10 MG rectal suppository 10 mg, 10 mg, Rectal, Daily PRN    fleet enema (Fleet) rectal enema 133 mL, 1 enema, Rectal, Once PRN  [7]   Patient Active Problem List  Diagnosis    Chronic systolic congestive heart failure (HCC)    Mixed hyperlipidemia    Mitral regurgitation    Prediabetes    COPD (chronic obstructive pulmonary disease) (HCC)    Monitoring for long-term anticoagulant use    Permanent atrial fibrillation (HCC)    Aortic atherosclerosis    Aortic valve sclerosis    Benign prostatic hyperplasia with urinary hesitancy    Primary hypertension    Common bile duct dilatation

## 2025-04-09 NOTE — ANESTHESIA POSTPROCEDURE EVALUATION
[facility]    Jared Nicole Patient Status:  Inpatient   Age/Gender 79 year old male MRN [id number]   Location [facility] ENDOSCOPY [facility]IN [facility] Attending Trent Linn DO   Hosp Day # 1 PCP Eric Neil MD       Anesthesia Post-op Note    ENDOSCOPIC RETROGRADE CHOLANGIOPANCREATOGRAPHY (ERCP) with biliary stent placement    Procedure Summary       Date: 04/09/25 Room / Location:  ENDOSCOPY 03 /  ENDOSCOPY; [facility] X-ray    Anesthesia Start: 1520 Anesthesia Stop: 1601    Procedures:       XR ENDO BILE DUCT (CPT=74328)      ENDOSCOPIC RETROGRADE CHOLANGIOPANCREATOGRAPHY (ERCP) with biliary stent placement Diagnosis:       Choledocholithiasis      (ercp)      (Choledocholithiasis)    Scheduled Providers: Yoan Cunha MD Anesthesiologist: Jez Olson MD    Anesthesia Type: MAC ASA Status: 3            Anesthesia Type: MAC    Vitals Value Taken Time   /67 04/09/25 16:00   Temp  04/09/25 16:00   Pulse 95 04/09/25 16:00   Resp 16 04/09/25 16:00   SpO2 93 % 04/09/25 16:00   Vitals shown include unfiled device data.        Patient Location: Endoscopy    Anesthesia Type: MAC    Airway Patency: patent    Postop Pain Control: adequate    Mental Status: preanesthetic baseline    Nausea/Vomiting: none    Cardiopulmonary/Hydration status: stable euvolemic    Complications: no apparent anesthesia related complications    Postop vital signs: stable    Dental Exam: Unchanged from Preop    Patient to be discharged from PACU when criteria met.

## 2025-04-09 NOTE — PROGRESS NOTES
[facility]   part of [facility]     Hospitalist Progress Note     Jared Nicole Patient Status:  Inpatient    1945 MRN [id number]   [facility] 3NW-A Attending Trent Linn DO   Hosp Day # 1 PCP Eric Neil MD     Chief Complaint: Abdominal pain    Subjective:     Patient seen and examined. Pain controlled. Denies N/V.     Objective:    Review of Systems:   A comprehensive review of systems was completed; pertinent positive and negatives stated in subjective.    Vital signs:  Temp:  [97 °F (36.1 °C)-98.3 °F (36.8 °C)] 98.2 °F (36.8 °C)  Pulse:  [69-99] 84  Resp:  [16-18] 18  BP: (103-132)/(53-75) 115/72  SpO2:  [88 %-98 %] 95 %    Physical Exam:    General: No acute distress  Respiratory: No wheezes, no rhonchi  Cardiovascular: S1, S2, regular rate and rhythm  Abdomen: Soft, mild RUQ tenderness, non-distended, positive bowel sounds  Neuro: No new focal deficits.   Extremities: No edema    Diagnostic Data:    Labs:  Recent Labs   Lab 25  0540 25  0839   WBC 11.3*  --  15.6*  --    HGB 15.4  --  14.0  --    MCV 91.0  --  91.6  --    .0  --  170.0  --    INR 3.12* 3.32*  --  4.08*       Recent Labs   Lab 25  0540   * 159*   BUN 15 15   CREATSERUM 1.13 1.34*   CA 9.1 9.1   ALB 4.3 4.1    139   K 4.1 4.4    105   CO2 24.0 25.0   ALKPHO 86 106   * 215*   ALT 54* 224*   BILT 2.1* 5.5*   TP 7.2 6.6       Estimated Glomerular Filtration Rate: 53 mL/min/1.73m2 (A) (result from lab).    Recent Labs   Lab 25  112   TROPHS <3       Recent Labs   Lab 25  11225  18025  0839   PTP 31.6* 33.9* 39.9*   INR 3.12* 3.32* 4.08*                  Microbiology    No results found for this visit on 25.      Imaging: Reviewed in Epic.    Medications: Scheduled Medications[1]    Assessment & Plan:      #Biliary colic  #Dilated CBD with filling defect   #Elevated LFTs  -GI  following - ERCP planned  -Monitor LFTs  -General surgery following - cholecystectomy planned - timing TBD    #Chronic afib  -Coumadin reversed with vitamin K and FFP  -BB/cardizem    #BPH  -Flomax     #CKDIII  -At baseline     #DL  -Statin on hold due to elevated LFTs    Trent Linn DO    Supplementary Documentation:     Quality:  DVT Mechanical Prophylaxis:   SCDs,    DVT Pharmacologic Prophylaxis   Medication   None                Code Status: Prior  Echols: No urinary catheter in place  Echols Duration (in days):   Central line:    DAT:     Discharge is dependent on: clinical progress  At this point Mr. Nicole is expected to be discharge to: home    The 21st Century Cures Act makes medical notes like these available to patients in the interest of transparency. Please be advised this is a medical document. Medical documents are intended to carry relevant information, facts as evident, and the clinical opinion of the practitioner. The medical note is intended as peer to peer communication and may appear blunt or direct. It is written in medical language and may contain abbreviations or verbiage that are unfamiliar.                         [1]    pantoprazole  40 mg Intravenous Q24H    atorvastatin  10 mg Oral Daily    dilTIAZem ER  240 mg Oral Daily    metoprolol tartrate  50 mg Oral 2x Daily(Beta Blocker)    tamsulosin  0.4 mg Oral Daily

## 2025-04-09 NOTE — PLAN OF CARE
A&Ox4. Telemetry: Afib RA/ Denies chest pain, shortness of breath GI: Abdomen soft, nondistended. Passing flatus : Voids. Pain controlled with PRN pain medications. Up with standby assist. Drains: Incisions: Diet: Cardiac  IVF per order. All appropriate safety measures in place.

## 2025-04-09 NOTE — CONSULTS
[facility] IP Report of Consultation Gastroenterology    4/9/2025    Jared Nicole  male   Marv Jolley MD     [id number]  12/17/1945 Primary Care Physician  Eric Neil MD     Reason for Consultation: elevated LFT, RUQ abd pain, chronic a/c    HPI: 79M w/ CHF + Afib on chronic a/c (warfarin), presenting w/ RUQ pain + elevated WBC + elevated LFTs, CT suspicious for CBD stone +/- early cholangitis. Last dose of warfarin was last evening PTA (Monday).    RUQ pain better this morning, but still rating at 3-4/10 intermittently (present at time of exam, tender in RUQ on palpation). Non-toxic appearing, AFVSS overnight despite WBC + LFT incr since last checks.    Discussed ERCP + RBA's today, pt amenable to proceed in light of chronic a/c. Will need IV VitK + FFP prior to procedure for correction given urgent need for ERCP today.    Last solid intake ~0800 this morning, strict NPO at time of my visit.    PROBLEM LIST:   Problem List[1]    PATIENT HISTORY:     Past Medical History[2]   Past Surgical History[3]   Family History[4]   Short Social Hx on File[5]     Allergies;  Allergies[6]     Medications:  Current Hospital Medications[7]     REVIEW OF SYSTEMS:   10pt ROS performed and o/w negative, see HPI for pertinent details.      EXAM:   /70 (BP Location: Right arm)   Pulse 69   Temp 97.8 °F (36.6 °C) (Oral)   Resp 18   Ht 5' 8\" (1.727 m)   Wt 194 lb (88 kg)   SpO2 96%   BMI 29.50 kg/m²  Body mass index is 29.5 kg/m².  Gen: cooperative, pleasant, not diaphoretic, nad   HEENT: ncat, normal neck ROM, normal op w/o ulcer/exudate, anicteric, mmm   Resp/Chest: normal respiratory effort, not dyspneic, no incr WOB/cough  CV: no reported palpitations or syncope  Abd: +focal mod RUQ tenderness to palpation, nd, no clinical features suggestive of peritoneal s/s noted  Ext: no c/c/e   Skin: warm, perfused, +jaundice, no pallor  Neuro: aaox3, grossly intact, no asterixis noted, normal speech        LAB/IMAGING RESULTS:     Lab Results   Component Value Date    WBC 15.6 04/09/2025    HGB 14.0 04/09/2025    HCT 41.3 04/09/2025    .0 04/09/2025     [unfilled]  Lab Results   Component Value Date    WBC 15.6 04/09/2025    HGB 14.0 04/09/2025    HCT 41.3 04/09/2025    .0 04/09/2025    CREATSERUM 1.34 04/09/2025    BUN 15 04/09/2025     04/09/2025    K 4.4 04/09/2025     04/09/2025    CO2 25.0 04/09/2025     04/09/2025    CA 9.1 04/09/2025    ALB 4.1 04/09/2025    ALKPHO 106 04/09/2025    BILT 5.5 04/09/2025    TP 6.6 04/09/2025     04/09/2025     04/09/2025    INR 4.08 04/09/2025    PTP 39.9 04/09/2025    LIP 63 04/08/2025         ASSESSMENT AND PLAN:   #Elevated LFTs + WBC (increased since admission)  #Choledocholithiasis, suspected early ascending cholangitis  #Abnormal CT of abdomen  #Chronic anticoagulation (warfarin held, still requires correction)    Plan for ERCP later today w/ Int-GI (Guerline), discussed case at length this morning. RBA's reviewed w/ pt at bedside.  NPO for now.  VitK 3mg IV x1 now.  2U FFP in 1-2hrs to start brisk INR correction, have 2U on hold for procedure.  Check INR 1hr prior to procedure (1400, anticipate ERCP [id number] today).  D/w'd RN + Hospitalist, RN will update orders accordingly.  Will continue to follow w/ you.  Discussed w/ pt + RN at length today.    The patient indicates understanding of these issues and agrees to the plan.      A total of 80 minutes was spent in direct patient care + assessment, chart review, and care coordination today.    Marv Jolley MD  Department of Gastroenterology  [facility]  4/9/2025  9:39 AM         [1]   Patient Active Problem List  Diagnosis    Chronic systolic congestive heart failure (HCC)    Mixed hyperlipidemia    Mitral regurgitation    Prediabetes    COPD (chronic obstructive pulmonary disease) (HCC)    Monitoring for long-term anticoagulant use    Permanent atrial  fibrillation (HCC)    Aortic atherosclerosis    Aortic valve sclerosis    Benign prostatic hyperplasia with urinary hesitancy    Primary hypertension    Common bile duct dilatation   [2]   Past Medical History:   Anemia    Arrhythmia    atrial fibrillation    Atrial fibrillation (HCC)    Cancer (HCC)    SKIN    CHF (congestive heart failure) (HCC)    mild     Colitis    DIVERTICULITIS    Colonic diverticular abscess    Congestive heart disease (HCC)    Hx musculoskletl dis NEC    BACK/DISC ISSUES    Long term (current) use of anticoagulants    Mitral regurgitation    Mild to moderate     Mitral valve prolapse    Monitoring for long-term anticoagulant use    Other and unspecified hyperlipidemia    Perforated bowel (HCC)    Prediabetes    SOB (shortness of breath)    Unspecified essential hypertension    Visual impairment   [3]   Past Surgical History:  Procedure Laterality Date    Colectomy      Colon surgery      Colonoscopy  2016    No polyps/inflammation noted, moderate to severe diverticulosis. Fecal transplant completed.    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    Tobacco Use    Smoking status: Former     Current packs/day: 0.00     Average packs/day: 1 pack/day for 20.0 years (20.0 ttl pk-yrs)     Types: Cigarettes     Start date: 1962     Quit date: 1982     Years since quittin.2    Smokeless tobacco: Never   Vaping Use    Vaping status: Never  Used   Substance and Sexual Activity    Alcohol use: Yes     Alcohol/week: 0.0 - 2.0 standard drinks of alcohol    Drug use: No   Other Topics Concern    Caffeine Concern Yes     Comment: 1-2 qweek     Exercise Yes     Comment: qday      Social Drivers of Health     Food Insecurity: No Food Insecurity (4/8/2025)    NCSS - Food Insecurity     Worried About Running Out of Food in the Last Year: No     Ran Out of Food in the Last Year: No   Transportation Needs: No Transportation Needs (4/8/2025)    NCSS - Transportation     Lack of Transportation: No   Housing Stability: Not At Risk (4/8/2025)    NCSS - Housing/Utilities     Has Housing: Yes     Worried About Losing Housing: No     Unable to Get Utilities: No   [6] No Known Allergies  [7]   Current Facility-Administered Medications:     HYDROcodone-acetaminophen (Norco) 5-325 MG per tab 1 tablet, 1 tablet, Oral, Q6H PRN    pantoprazole (Protonix) 40 mg in sodium chloride 0.9% PF 10 mL IV push, 40 mg, Intravenous, Q24H    atorvastatin (Lipitor) tab 10 mg, 10 mg, Oral, Daily    dilTIAZem ER (Dilacor XR) 24 hr cap 240 mg, 240 mg, Oral, Daily    metoprolol tartrate (Lopressor) tab 50 mg, 50 mg, Oral, 2x Daily(Beta Blocker)    tamsulosin (Flomax) cap 0.4 mg, 0.4 mg, Oral, Daily    melatonin tab 3 mg, 3 mg, Oral, Nightly PRN    ondansetron (Zofran) 4 MG/2ML injection 4 mg, 4 mg, Intravenous, Q6H PRN    metoclopramide (Reglan) 5 mg/mL injection 5 mg, 5 mg, Intravenous, Q8H PRN    sodium chloride 0.9% infusion, , Intravenous, Continuous    acetaminophen (Tylenol Extra Strength) tab 500 mg, 500 mg, Oral, Q4H PRN    HYDROmorphone (Dilaudid) 1 MG/ML injection 0.2 mg, 0.2 mg, Intravenous, Q2H PRN **OR** HYDROmorphone (Dilaudid) 1 MG/ML injection 0.4 mg, 0.4 mg, Intravenous, Q2H PRN **OR** HYDROmorphone (Dilaudid) 1 MG/ML injection 0.8 mg, 0.8 mg, Intravenous, Q2H PRN    polyethylene glycol (PEG 3350) (Miralax) 17 g oral packet 17 g, 17 g, Oral, Daily PRN    sennosides (Senokot)  tab 17.2 mg, 17.2 mg, Oral, Nightly PRN    bisacodyl (Dulcolax) 10 MG rectal suppository 10 mg, 10 mg, Rectal, Daily PRN    fleet enema (Fleet) rectal enema 133 mL, 1 enema, Rectal, Once PRN

## 2025-04-10 ENCOUNTER — APPOINTMENT (OUTPATIENT)
Dept: CT IMAGING | Facility: HOSPITAL | Age: 80
End: 2025-04-10
Attending: HOSPITALIST
Payer: MEDICARE

## 2025-04-10 ENCOUNTER — APPOINTMENT (OUTPATIENT)
Dept: CV DIAGNOSTICS | Facility: HOSPITAL | Age: 80
End: 2025-04-10
Attending: HOSPITALIST
Payer: MEDICARE

## 2025-04-10 ENCOUNTER — APPOINTMENT (OUTPATIENT)
Dept: GENERAL RADIOLOGY | Facility: HOSPITAL | Age: 80
End: 2025-04-10
Attending: INTERNAL MEDICINE
Payer: MEDICARE

## 2025-04-10 LAB
ALBUMIN SERPL-MCNC: 3.9 G/DL (ref 3.2–4.8)
ALT SERPL-CCNC: 129 U/L (ref 10–49)
ANION GAP SERPL CALC-SCNC: 8 MMOL/L (ref 0–18)
AST SERPL-CCNC: 92 U/L (ref ?–34)
BILIRUB SERPL-MCNC: 4.3 MG/DL (ref 0.2–1.1)
BLOOD TYPE BARCODE: 7300
BLOOD TYPE BARCODE: 8400
BUN BLD-MCNC: 16 MG/DL (ref 9–23)
CALCIUM BLD-MCNC: 9.2 MG/DL (ref 8.7–10.6)
CHLORIDE SERPL-SCNC: 106 MMOL/L (ref 98–112)
CO2 SERPL-SCNC: 25 MMOL/L (ref 21–32)
CREAT BLD-MCNC: 1 MG/DL (ref 0.7–1.3)
D DIMER PPP FEU-MCNC: 4.1 UG/ML FEU (ref ?–0.81)
EGFRCR SERPLBLD CKD-EPI 2021: 76 ML/MIN/1.73M2 (ref 60–?)
ERYTHROCYTE [DISTWIDTH] IN BLOOD BY AUTOMATED COUNT: 14.6 %
GLUCOSE BLD-MCNC: 130 MG/DL (ref 70–99)
HCT VFR BLD AUTO: 40.7 % (ref 39–53)
HGB BLD-MCNC: 13.9 G/DL (ref 13–17.5)
INR BLD: 1.56 (ref 0.8–1.2)
MCH RBC QN AUTO: 31.2 PG (ref 26–34)
MCHC RBC AUTO-ENTMCNC: 34.2 G/DL (ref 31–37)
MCV RBC AUTO: 91.5 FL (ref 80–100)
NT-PROBNP SERPL-MCNC: 4593 PG/ML (ref ?–450)
OSMOLALITY SERPL CALC.SUM OF ELEC: 291 MOSM/KG (ref 275–295)
PLATELET # BLD AUTO: 151 10(3)UL (ref 150–450)
POTASSIUM SERPL-SCNC: 3.8 MMOL/L (ref 3.5–5.1)
PROT SERPL-MCNC: 6.5 G/DL (ref 5.7–8.2)
PROTHROMBIN TIME: 18.8 SECONDS (ref 11.6–14.8)
RBC # BLD AUTO: 4.45 X10(6)UL (ref 3.8–5.8)
SODIUM SERPL-SCNC: 139 MMOL/L (ref 136–145)
UNIT VOLUME: 271 ML
UNIT VOLUME: 344 ML
WBC # BLD AUTO: 14 X10(3) UL (ref 4–11)

## 2025-04-10 PROCEDURE — 99232 SBSQ HOSP IP/OBS MODERATE 35: CPT | Performed by: SURGERY

## 2025-04-10 PROCEDURE — 71045 X-RAY EXAM CHEST 1 VIEW: CPT | Performed by: INTERNAL MEDICINE

## 2025-04-10 PROCEDURE — 71275 CT ANGIOGRAPHY CHEST: CPT | Performed by: HOSPITALIST

## 2025-04-10 PROCEDURE — 93306 TTE W/DOPPLER COMPLETE: CPT | Performed by: HOSPITALIST

## 2025-04-10 PROCEDURE — 99232 SBSQ HOSP IP/OBS MODERATE 35: CPT | Performed by: HOSPITALIST

## 2025-04-10 RX ORDER — POTASSIUM CHLORIDE 1500 MG/1
40 TABLET, EXTENDED RELEASE ORAL ONCE
Status: COMPLETED | OUTPATIENT
Start: 2025-04-10 | End: 2025-04-10

## 2025-04-10 RX ORDER — FUROSEMIDE 10 MG/ML
40 INJECTION INTRAMUSCULAR; INTRAVENOUS ONCE
Status: COMPLETED | OUTPATIENT
Start: 2025-04-10 | End: 2025-04-10

## 2025-04-10 RX ORDER — FUROSEMIDE 10 MG/ML
40 INJECTION INTRAMUSCULAR; INTRAVENOUS
Status: DISCONTINUED | OUTPATIENT
Start: 2025-04-10 | End: 2025-04-13

## 2025-04-10 NOTE — PROGRESS NOTES
[facility]  Progress Note    Jared Nicole Patient Status:  Inpatient    1945 MRN [id number]   Location [facility] 3NW-A Attending Trent Linn,    Hosp Day # 2 PCP Eric Neil MD     Subjective:  The patient was seen and examined at bedside. He reports shortness of breath this morning. I started when he stood up to urinate. He denies chest pain. CXR with atelectasis. D-dimer elevated. CTA chest and echo are pending.     He reports improvement in his abdominal pain. He denies nausea or vomiting. He is passing flatus, but has not had a bowel movement.     AM labs pending.     Objective/Physical Exam:  /87 (BP Location: Right arm)   Pulse 104   Temp 97.3 °F (36.3 °C) (Oral)   Resp 24   Ht 68\"   Wt 194 lb (88 kg)   SpO2 93%   BMI 29.50 kg/m²     Intake/Output Summary (Last 24 hours) at 4/10/2025 0812  Last data filed at 4/10/2025 0627  Gross per 24 hour   Intake 1205.83 ml   Output 750 ml   Net 455.83 ml         General: Alert, oriented x3. No acute distress.  HEENT: Normocephalic, atraumatic. No scleral icterus.  Pulmonary: No respiratory distress, effort normal.   Abdomen: Mildly-distended, without tympany to percussion. Soft, right upper quadrant tenderness to palpation. No rebound or guarding. No peritoneal signs. Easily reducible ventral hernia.   Extremities: No lower extremity edema. No clubbing or cyanosis.   Skin: Warm, dry. No jaundice.       Labs:      Lab Results   Component Value Date    PT 26.4 (H) 2014    PT 24.7 (H) 2014    PT 18.0 (H) 2013    INR 1.81 (H) 2025    INR 4.08 (H) 2025    INR 3.32 (H) 2025     Lab Results   Component Value Date     04/10/2025    K 3.8 04/10/2025     04/10/2025    CO2 25.0 04/10/2025    BUN 16 04/10/2025    CREATSERUM 1.00 04/10/2025     04/10/2025    CA 9.2 04/10/2025    TP 6.5 04/10/2025          Assessment:  Problem List[1]    Cholelithiasis  Common bile duct dilation  A-fib on  Coumadin  Transaminitis  Hyperbilirubinemia  PPD 1 ERCP with stent placement    Plan:  Continue NPO  CTA chest and echo today to further evaluate SOB   Antiemetics and analgesics as needed  Surgical timing for laparoscopic cholecystectomy pending workup of the above  The patient will require cardiac clearance  Appreciate GI recommendations  Medical management per primary  Continue to trend PT, LFTs and total bilirubin  DVT prophylaxis with SCDs-okay for anticoagulation form a general surgical standpoint  GI prophylaxis with protonix       My total face time with this patient was 25 minutes. Greater than half of the visit was spent in counseling the patient on the above listed diagnoses and treatment options.     Mikayla Rock PA-C  4/10/2025  8:12 AM    Patient is being visited with his wife at the bedside.  Review of gastroenterology procedure note reveals biliary stent in place however stone extraction and/or manipulation was not performed due to the patient's anticoagulation status.    Plan from GI standpoint is to return patient for retrieval of choledocholithiasis in 1 to 2 weeks on an outpatient basis    Serology today reveals total bilirubin mildly diminished to 4.3, SGOT and SGPT also slightly lower but remain elevated.  Chest x-ray from 3 AM reveals cardiomegaly with interstitial opacities likely representing edema.  Patchy opacities in the left lung may represent edema versus atelectasis versus infiltrate.  CTA of the chest performed earlier today reveals no evidence of pulmonary emboli, groundglass opacities possibly pneumonia versus edema.  Mild gallbladder wall thickening is also noted.  Intrahepatic air due to CBD stent is seen.  The patient reports that his abdominal pain has improved.  On physical examination there is some residual tenderness in the right upper quadrant.  There is no evidence of guarding, rebound or percussion tenderness.  Patient was noted to be somewhat short of breath  with elevated D-dimer.  Cardiology is now seeing patient.  Further recommendations regarding possible cholecystectomy versus cholecystostomy tube will be based on clinical course and medical status.  Patient will be seen by Dr. Dais as the on-call physician tomorrow.  Jared and his wife have no further questions at this time and will proceed as discussed    I agree with the note above and attest to its accuracy with the following changes below after my interview and examination of the patient    The patient was seen and examined.  Available labs and radiology is noted.    Irma Hitchcock MD FACS    Please note that this report has been produced using speech recognition software and may contain errors related to that system including but not limited to errors in grammar, punctuation and spelling as well as words and phrases that possibly may have been recognized inappropriately.  If there are any questions or concerns please contact the dictating provider for clarification.    The 21st Century Cures Act makes medical notes like these available to patients in the interest of trans parency. Please be advised this is a medical document. Medical documents are intended to carry relevant information, facts as evident, and the clinical opinion of the practitioner. The medical note is intended as peer to peer communication and may appear blunt or direct. It is written in medical language and may contain abbreviations or verbiage that are unfamiliar.   Again if there are any questions or concerns please contact the dictating provider for clarification.                [1]   Patient Active Problem List  Diagnosis    Chronic systolic congestive heart failure (HCC)    Mixed hyperlipidemia    Mitral regurgitation    Prediabetes    COPD (chronic obstructive pulmonary disease) (HCC)    Monitoring for long-term anticoagulant use    Permanent atrial fibrillation (HCC)    Aortic atherosclerosis    Aortic valve sclerosis    Benign  prostatic hyperplasia with urinary hesitancy    Primary hypertension    Common bile duct dilatation    Cholangitis (HCC)    Choledocholithiasis    Calculus of gallbladder with acute cholecystitis and obstruction    Anticoagulated on Coumadin    Paroxysmal atrial fibrillation (HCC)

## 2025-04-10 NOTE — PROGRESS NOTES
[facility] Follow-up    4/10/2025    Jared Davisen  male   Marv Jolley MD     [id number]  12/17/1945 Primary Care Physician  Eric Neil MD     S: NAEO, ERCP completed last night w/ decompressive stent placement (stones could not be removed d/t elevated INR, but draining well). No further RUQ, resolved following procedure.    Some acute CP/SOB this morning when seen Surgery (none at time of my visit), w/u pending.    PROBLEM LIST:   Problem List[1]    Medications:  Current Hospital Medications[2]       EXAM:   /87 (BP Location: Right arm)   Pulse 104   Temp 97.3 °F (36.3 °C) (Oral)   Resp 24   Ht 5' 8\" (1.727 m)   Wt 194 lb (88 kg)   SpO2 93%   BMI 29.50 kg/m²  Body mass index is 29.5 kg/m².  Gen: cooperative, pleasant, not diaphoretic, nad   HEENT: ncat, normal neck ROM, normal op w/o ulcer/exudate, anicteric, mmm   Resp/Chest: normal respiratory effort, not dyspneic, no incr WOB/cough  CV: no reported palpitations or syncope  Abd: nt, nd, no clinical features suggestive of peritoneal s/s noted  Ext: no c/c/e   Skin: warm, perfused, no jaundice, no pallor  Neuro: aaox3, grossly intact, no asterixis noted, normal speech       LAB/IMAGING RESULTS:     Lab Results   Component Value Date    WBC 14.0 04/10/2025    HGB 13.9 04/10/2025    HCT 40.7 04/10/2025    .0 04/10/2025     [unfilled]  Lab Results   Component Value Date    WBC 14.0 04/10/2025    HGB 13.9 04/10/2025    HCT 40.7 04/10/2025    .0 04/10/2025    CREATSERUM 1.00 04/10/2025    BUN 16 04/10/2025     04/10/2025    K 3.8 04/10/2025     04/10/2025    CO2 25.0 04/10/2025     04/10/2025    CA 9.2 04/10/2025    ALB 3.9 04/10/2025    BILT 4.3 04/10/2025    TP 6.5 04/10/2025    AST 92 04/10/2025     04/10/2025    INR 1.81 04/09/2025    PTP 21.1 04/09/2025    DDIMER 4.10 04/10/2025     LFTs + WBC improved today.    ERCP 4/9  The side-viewing duodenoscope was introduced into the esophagus  and advanced to the 2nd portion.  Ampulla was visualized and appeared normal but was buried in diverticulum.  Biliary cannulation was attempted to be achieved with autotome 44 sphincterotome with preloaded with 035\" guidewire. CBD was dilated to 15mm with large 13mm stones x 3.  Due to elevated INR, a 8.5fr x 9cm biliary stent was placed with excellent biliary drainage.  No pus was seen.  No PD cannulation noted. Scope was withdrawn from the patient and patient tolerated the procedure well.  FINDINGS   CBD stones  RECOMMENDATIONS: repeat ERCP in 3-4 weeks off coumadin to safely perform sphincterotomy and stone extraction; advance diet as tolerated. Resume anticoagulation    ASSESSMENT AND PLAN:   #Choledocholithiasis, suspected early ascending cholangitis  #Abnormal CT of abdomen  #Chronic anticoagulation (warfarin held, IV VitK + FFP 4/9)  #Elevated LFTs + WBC (improving, s/p ERCP 4/9 w/ decompressive biliary stent placement in setting of coagulopathy)    No acute GI issues at present, RUQ pain resolved.  Interval improvement in LFT + WBC since yesterday, encouraging.  Further w/u of acute CP/SOB this morning, comfortable resting in bed at time of my visit. Hospitalist + Surgery aware.  Further recs per Surgery re: CCY candidacy if appropriate.  Will sign off for now, call if questions arise.  Discussed w/ pt at beside this morning.  Discussed w/ Int-GI this morning as well. Will need rpt ERCP in 3-4wks off a/c (Int-GI will arrange).    The patient indicates understanding of these issues and agrees to the plan.    A total of 35 minutes was spent in direct patient care + assessment, chart review, and care coordination today.    Marv Jolley MD  Department of Gastroenterology  [facility]  4/10/2025  10:42 AM         [1]   Patient Active Problem List  Diagnosis    Chronic systolic congestive heart failure (HCC)    Mixed hyperlipidemia    Mitral regurgitation    Prediabetes    COPD (chronic obstructive  pulmonary disease) (HCC)    Monitoring for long-term anticoagulant use    Permanent atrial fibrillation (HCC)    Aortic atherosclerosis    Aortic valve sclerosis    Benign prostatic hyperplasia with urinary hesitancy    Primary hypertension    Common bile duct dilatation    Cholangitis (HCC)    Choledocholithiasis    Calculus of gallbladder with acute cholecystitis and obstruction    Anticoagulated on Coumadin    Paroxysmal atrial fibrillation (HCC)   [2]   Current Facility-Administered Medications:     HYDROcodone-acetaminophen (Norco) 5-325 MG per tab 1 tablet, 1 tablet, Oral, Q6H PRN    pantoprazole (Protonix) 40 mg in sodium chloride 0.9% PF 10 mL IV push, 40 mg, Intravenous, Q24H    dextromethorphan-guaiFENesin (Robitussin-DM)  mg/5mL oral solution 5 mL, 5 mL, Oral, Q6H PRN    dilTIAZem ER (Dilacor XR) 24 hr cap 240 mg, 240 mg, Oral, Daily    metoprolol tartrate (Lopressor) tab 50 mg, 50 mg, Oral, 2x Daily(Beta Blocker)    tamsulosin (Flomax) cap 0.4 mg, 0.4 mg, Oral, Daily    melatonin tab 3 mg, 3 mg, Oral, Nightly PRN    ondansetron (Zofran) 4 MG/2ML injection 4 mg, 4 mg, Intravenous, Q6H PRN    metoclopramide (Reglan) 5 mg/mL injection 5 mg, 5 mg, Intravenous, Q8H PRN    acetaminophen (Tylenol Extra Strength) tab 500 mg, 500 mg, Oral, Q4H PRN    HYDROmorphone (Dilaudid) 1 MG/ML injection 0.2 mg, 0.2 mg, Intravenous, Q2H PRN **OR** HYDROmorphone (Dilaudid) 1 MG/ML injection 0.4 mg, 0.4 mg, Intravenous, Q2H PRN **OR** HYDROmorphone (Dilaudid) 1 MG/ML injection 0.8 mg, 0.8 mg, Intravenous, Q2H PRN    polyethylene glycol (PEG 3350) (Miralax) 17 g oral packet 17 g, 17 g, Oral, Daily PRN    sennosides (Senokot) tab 17.2 mg, 17.2 mg, Oral, Nightly PRN    bisacodyl (Dulcolax) 10 MG rectal suppository 10 mg, 10 mg, Rectal, Daily PRN    fleet enema (Fleet) rectal enema 133 mL, 1 enema, Rectal, Once PRN

## 2025-04-10 NOTE — PROGRESS NOTES
[facility]   part of [facility]     Hospitalist Progress Note     Jared Nicole Patient Status:  Inpatient    1945 MRN [id number]   Location [facility] 3NW-A Attending Trent Linn,    Hosp Day # 2 PCP Eric Neil MD     Chief Complaint: Abdominal pain    Subjective:     Patient seen and examined. Developed SOB this morning, a bit better when seen. Right abdomen still a bit sore.     Objective:    Review of Systems:   A comprehensive review of systems was completed; pertinent positive and negatives stated in subjective.    Vital signs:  Temp:  [97.3 °F (36.3 °C)-98.7 °F (37.1 °C)] 98.7 °F (37.1 °C)  Pulse:  [] 66  Resp:  [16-24] 20  BP: (102-143)/(60-93) 102/61  SpO2:  [92 %-96 %] 95 %    Physical Exam:    General: No acute distress  Respiratory: No wheezes, no rhonchi  Cardiovascular: S1, S2, regular rate and rhythm  Abdomen: Soft, mild RUQ tenderness, non-distended, positive bowel sounds  Neuro: No new focal deficits.   Extremities: No edema    Diagnostic Data:    Labs:  Recent Labs   Lab 25  1121 25  1804 25  0540 25  0839 25  1356 04/10/25  0809   WBC 11.3*  --  15.6*  --   --  14.0*   HGB 15.4  --  14.0  --   --  13.9   MCV 91.0  --  91.6  --   --  91.5   .0  --  170.0  --   --  151.0   INR 3.12* 3.32*  --  4.08* 1.81*  --        Recent Labs   Lab 25  1121 25  0540 04/10/25  0707   * 159* 130*   BUN 15 15 16   CREATSERUM 1.13 1.34* 1.00   CA 9.1 9.1 9.2   ALB 4.3 4.1 3.9    139 139   K 4.1 4.4 3.8    105 106   CO2 24.0 25.0 25.0   ALKPHO 86 106  --    * 215* 92*   ALT 54* 224* 129*   BILT 2.1* 5.5* 4.3*   TP 7.2 6.6 6.5       Estimated Glomerular Filtration Rate: 76 mL/min/1.73m2 (result from lab).    Recent Labs   Lab 25  1121   TROPHS <3       Recent Labs   Lab 25  1804 25  0839 25  1356   PTP 33.9* 39.9* 21.1*   INR 3.32* 4.08* 1.81*              Microbiology    No results  found for this visit on 04/08/25.      Imaging: Reviewed in Epic.    Medications: Scheduled Medications[1]    Assessment & Plan:      #Biliary colic  #Dilated CBD with filling defect   #Elevated LFTs  -S/p ERCP 4/9 with biliary stent placement   -Monitor LFTs - improving   -General surgery following - cholecystectomy planned - timing TBD    #Acute hypoxia   #Acute pulmonary edema  #Elevated pro-BNP  -Suspect transfusion related fluid overload v ? Component of CHF  -Pending echo, recent echo from 12/24 without evidence of CHF  -D-dimer elevated - CTA chest without PE  -Cardiology consulted by surgery for pre-op evaluation   -Wean oxygen as able    #Chronic afib  -Coumadin reversed with vitamin K and FFP  -BB/cardizem    #BPH  -Flomax     #CKDIII  -At baseline     #DL  -Statin on hold due to elevated LFTs    Trent Linn,     Supplementary Documentation:     Quality:  DVT Mechanical Prophylaxis:   SCDs,    DVT Pharmacologic Prophylaxis   Medication   None                Code Status: Prior  Echols: No urinary catheter in place  Echols Duration (in days):   Central line:    DAT:     Discharge is dependent on: clinical progress  At this point Mr. Nicole is expected to be discharge to: home    The 21st Century Cures Act makes medical notes like these available to patients in the interest of transparency. Please be advised this is a medical document. Medical documents are intended to carry relevant information, facts as evident, and the clinical opinion of the practitioner. The medical note is intended as peer to peer communication and may appear blunt or direct. It is written in medical language and may contain abbreviations or verbiage that are unfamiliar.                         [1]    pantoprazole  40 mg Intravenous Q24H    dilTIAZem ER  240 mg Oral Daily    metoprolol tartrate  50 mg Oral 2x Daily(Beta Blocker)    tamsulosin  0.4 mg Oral Daily

## 2025-04-10 NOTE — PLAN OF CARE
Patient is alert and oriented. On 2L of oxygen via nasal cannula. Patient practicing IS at bedside. Patient denies SOB while resting. Patient c/o cough. IV lasix given x1. Tele with A-fib. Patient denies passing gas. Patient denies nausea. Voiding freely. Saline locked. NPO. Dr. Schmidt made aware of cardiology consult. Orders received to stop IVF. POC updated with patient. Patient verbalized understanding.

## 2025-04-10 NOTE — PLAN OF CARE
A&Ox4. Telemetry: Afib 2L NC  Denies chest pain, shortness of breath GI: Abdomen soft, nondistended. Passing flatus : Voids. Pain controlled with PRN pain medications. Up with standby assist.  Diet: NPO IVF per order. All appropriate safety measures in place.     0200 Pt co sob when using urinal. VSS. Chest xray & D-dimer ordered by MD.

## 2025-04-11 ENCOUNTER — APPOINTMENT (OUTPATIENT)
Dept: INTERVENTIONAL RADIOLOGY/VASCULAR | Facility: HOSPITAL | Age: 80
End: 2025-04-11
Payer: MEDICARE

## 2025-04-11 PROBLEM — I50.31 ACUTE DIASTOLIC HEART FAILURE (HCC): Status: ACTIVE | Noted: 2025-04-11

## 2025-04-11 LAB
ALBUMIN SERPL-MCNC: 3.9 G/DL (ref 3.2–4.8)
ALBUMIN/GLOB SERPL: 1.4 {RATIO} (ref 1–2)
ALK PHOSPHATASE: 135 IU/L
ALP LIVER SERPL-CCNC: 130 U/L (ref 45–117)
ALT SERPL-CCNC: 91 U/L (ref 10–49)
ANION GAP SERPL CALC-SCNC: 11 MMOL/L (ref 0–18)
AST SERPL-CCNC: 55 U/L (ref ?–34)
BILIRUB SERPL-MCNC: 3.6 MG/DL (ref 0.2–1.1)
BONE FRAC: 32 %
BUN BLD-MCNC: 18 MG/DL (ref 9–23)
CALCIUM BLD-MCNC: 9.1 MG/DL (ref 8.7–10.6)
CHLORIDE SERPL-SCNC: 104 MMOL/L (ref 98–112)
CO2 SERPL-SCNC: 26 MMOL/L (ref 21–32)
CREAT BLD-MCNC: 0.96 MG/DL (ref 0.7–1.3)
EGFRCR SERPLBLD CKD-EPI 2021: 79 ML/MIN/1.73M2 (ref 60–?)
ERYTHROCYTE [DISTWIDTH] IN BLOOD BY AUTOMATED COUNT: 14.3 %
GLOBULIN PLAS-MCNC: 2.7 G/DL (ref 2–3.5)
GLUCOSE BLD-MCNC: 109 MG/DL (ref 70–99)
HCT VFR BLD AUTO: 39.9 % (ref 39–53)
HGB BLD-MCNC: 13.4 G/DL (ref 13–17.5)
INR BLD: 1.24 (ref 0.8–1.2)
INTESTINAL FRAC: 3 %
LIVER FRAC: 65 %
MCH RBC QN AUTO: 30.5 PG (ref 26–34)
MCHC RBC AUTO-ENTMCNC: 33.6 G/DL (ref 31–37)
MCV RBC AUTO: 90.9 FL (ref 80–100)
OSMOLALITY SERPL CALC.SUM OF ELEC: 294 MOSM/KG (ref 275–295)
PLATELET # BLD AUTO: 150 10(3)UL (ref 150–450)
POTASSIUM SERPL-SCNC: 3.5 MMOL/L (ref 3.5–5.1)
POTASSIUM SERPL-SCNC: 3.5 MMOL/L (ref 3.5–5.1)
POTASSIUM SERPL-SCNC: 4.2 MMOL/L (ref 3.5–5.1)
PROT SERPL-MCNC: 6.6 G/DL (ref 5.7–8.2)
PROTHROMBIN TIME: 15.7 SECONDS (ref 11.6–14.8)
RBC # BLD AUTO: 4.39 X10(6)UL (ref 3.8–5.8)
SODIUM SERPL-SCNC: 141 MMOL/L (ref 136–145)
WBC # BLD AUTO: 11.5 X10(3) UL (ref 4–11)

## 2025-04-11 PROCEDURE — 0F9430Z DRAINAGE OF GALLBLADDER WITH DRAINAGE DEVICE, PERCUTANEOUS APPROACH: ICD-10-PCS | Performed by: RADIOLOGY

## 2025-04-11 PROCEDURE — 99232 SBSQ HOSP IP/OBS MODERATE 35: CPT | Performed by: HOSPITALIST

## 2025-04-11 RX ORDER — PIPERACILLIN SODIUM, TAZOBACTAM SODIUM 3; .375 G/15ML; G/15ML
INJECTION, POWDER, LYOPHILIZED, FOR SOLUTION INTRAVENOUS
Status: COMPLETED
Start: 2025-04-11 | End: 2025-04-11

## 2025-04-11 RX ORDER — IOPAMIDOL 612 MG/ML
25 INJECTION, SOLUTION INTRAVASCULAR
Status: COMPLETED | OUTPATIENT
Start: 2025-04-11 | End: 2025-04-11

## 2025-04-11 RX ORDER — METOPROLOL TARTRATE 1 MG/ML
5 INJECTION, SOLUTION INTRAVENOUS EVERY 6 HOURS PRN
Status: DISCONTINUED | OUTPATIENT
Start: 2025-04-11 | End: 2025-04-15

## 2025-04-11 RX ORDER — POTASSIUM CHLORIDE 1500 MG/1
40 TABLET, EXTENDED RELEASE ORAL EVERY 4 HOURS
Status: COMPLETED | OUTPATIENT
Start: 2025-04-11 | End: 2025-04-11

## 2025-04-11 RX ORDER — HYDROMORPHONE HYDROCHLORIDE 1 MG/ML
INJECTION, SOLUTION INTRAMUSCULAR; INTRAVENOUS; SUBCUTANEOUS
Status: DISCONTINUED
Start: 2025-04-11 | End: 2025-04-11 | Stop reason: WASHOUT

## 2025-04-11 RX ORDER — DEXTROSE 5 % IN WATER
PIGGYBACK WITH THREADED PORT (ML) INTRAVENOUS
Status: COMPLETED
Start: 2025-04-11 | End: 2025-04-11

## 2025-04-11 RX ORDER — LIDOCAINE HYDROCHLORIDE 10 MG/ML
INJECTION, SOLUTION INFILTRATION; PERINEURAL
Status: COMPLETED
Start: 2025-04-11 | End: 2025-04-11

## 2025-04-11 RX ORDER — MIDAZOLAM HYDROCHLORIDE 1 MG/ML
INJECTION INTRAMUSCULAR; INTRAVENOUS
Status: COMPLETED
Start: 2025-04-11 | End: 2025-04-11

## 2025-04-11 RX ORDER — DILTIAZEM HYDROCHLORIDE 5 MG/ML
10 INJECTION INTRAVENOUS ONCE
Status: COMPLETED | OUTPATIENT
Start: 2025-04-11 | End: 2025-04-11

## 2025-04-11 RX ORDER — METOPROLOL TARTRATE 1 MG/ML
5 INJECTION, SOLUTION INTRAVENOUS ONCE
Status: COMPLETED | OUTPATIENT
Start: 2025-04-11 | End: 2025-04-11

## 2025-04-11 NOTE — PROGRESS NOTES
[facility]  Progress Note    Jared Nicole Patient Status:  Inpatient    1945 MRN [id number]   Location [facility] 3NW-A Attending Trent Linn, DO   Hosp Day # 3 PCP Eric Neil MD     Subjective:  He is seen and examined resting in bed. He reports continued improvement in his abdominal pain.     Objective/Physical Exam:  /65 (BP Location: Right arm)   Pulse 80   Temp 98.4 °F (36.9 °C) (Oral)   Resp 16   Ht 5' 8\" (1.727 m)   Wt 194 lb (88 kg)   SpO2 95%   BMI 29.50 kg/m²     Intake/Output Summary (Last 24 hours) at 2025 1013  Last data filed at 2025 0757  Gross per 24 hour   Intake 120 ml   Output 1800 ml   Net -1680 ml         General: Awake, Alert, Cooperative.  No apparent distress.  HEENT: normocephalic, atraumatic. No scleral icterus  Pulm: no respiratory distress, no increased work of breathing  Abdomen:  Soft, mildly distended,tender to palpation in right upper quadrant, with no rebound or guarding.  No peritoneal signs.      Labs:  Lab Results   Component Value Date    WBC 11.5 2025    HGB 13.4 2025    HCT 39.9 2025    .0 2025      Lab Results   Component Value Date    PT 26.4 (H) 2014    PT 24.7 (H) 2014    PT 18.0 (H) 2013    INR 1.56 (H) 04/10/2025    INR 1.81 (H) 2025    INR 4.08 (H) 2025     Lab Results   Component Value Date     2025    K 3.5 2025    K 3.5 2025     2025    CO2 26.0 2025    BUN 18 2025    CREATSERUM 0.96 2025     2025    CA 9.1 2025    ALKPHO 130 2025    ALT 91 2025    AST 55 2025    BILT 3.6 2025    ALB 3.9 2025    TP 6.6 2025            Assessment:  Problem List[1]    Cholelithiasis  Common bile duct dilation  A-fib on Coumadin  Transaminitis  Hyperbilirubinemia  PPD 2 ERCP with stent placement    Plan:  Given patient is deemed high-risk for surgery by cardiology,  recommend IR cholecystostomy tube placement.   NPO pending procedure  Analgesics and antiemetics as needed  Encourage ambulation and up to chair  DVT prophylaxis- with SCDs  GI prophylaxis with protonix .     Cynthia Albrecht PA-C  4/11/2025  10:13 AM    Addendum:    The patient was independently examined. I agree with the PA's assessment and plan.     Care plan discussed with patient.  All questions answered.    More than 50% of clinical time and 100% MDM was performed by me.     I, Dr. Janes Dias, personally performed the services described in this documentation, as documented  by Ms Ambrocio PA-C,   and they are both accurate and complete.      Janes Dias MD FACS  Trauma Medical Director, [facility]    The 21st Century Cures Act makes medical notes like these available to patients in the interest of transparency. Please be advised this is a medical document. Medical documents are intended to carry relevant information, facts as evident, and the clinical opinion of the practitioner. The medical note is intended as peer to peer communication and may appear blunt or direct. It is written in medical language and may contain abbreviations or verbiage that are unfamiliar.    This note was prepared using Dragon Medical voice recognition dictation software. As a result, errors may occur. When identified, these errors have been corrected. While every attempt is made to correct errors during dictation, discrepancies may still exist.           [1]   Patient Active Problem List  Diagnosis    Chronic systolic congestive heart failure (HCC)    Mixed hyperlipidemia    Mitral regurgitation    Prediabetes    COPD (chronic obstructive pulmonary disease) (HCC)    Monitoring for long-term anticoagulant use    Permanent atrial fibrillation (HCC)    Aortic atherosclerosis    Aortic valve sclerosis    Benign prostatic hyperplasia with urinary hesitancy    Primary hypertension    Common bile duct  dilatation    Cholangitis (HCC)    Choledocholithiasis    Calculus of gallbladder with acute cholecystitis and obstruction    Anticoagulated on Coumadin    Paroxysmal atrial fibrillation (HCC)

## 2025-04-11 NOTE — PROGRESS NOTES
Assumed care of patient around 2330.  Patient is alert and oriented x 4.   Vital signs stable. Afebrile. On  2L of oxygen via nasal cannula with . A-fib on tele.  NPO at midnight. Denies nausea or vomiting. Abdomen soft, passing gas and belching.  Patient voids freely.  Patient denies pain at this time.  Ambulating with standby assist.  Plan of care discussed with patient; no further questions at this time.  All appropriate safety measures in place. Call light in reach.   Reports given to ROBEL Abdi at 0130.

## 2025-04-11 NOTE — PLAN OF CARE
Assumed care of patient at 0700  PRN Bryce for pain  Weaned to RA  K+ replaced per protocol  NPO for IR cholecystostomy drain placement  Contact isolation dc'd  Continue IV diuretics  Patient having bursts of RVR, Cardiology notified. One time dose of IV metoprolol and cardiac meds adjusted.   Family at the bedside, updated on POC  Patient currently resting in bed, call light within reach    Addendum: Patient and family educated on importance to use the urinal for urine output. Patient was getting up to the bathroom with family assistance and urinating in the toilet. Patient and family educated on diuretic use and the need to track urine output with urinal. Patient and family verbalized understanding                Problem: PAIN - ADULT  Goal: Verbalizes/displays adequate comfort level or patient's stated pain goal  Description: INTERVENTIONS:- Encourage pt to monitor pain and request assistance- Assess pain using appropriate pain scale- Administer analgesics based on type and severity of pain and evaluate response- Implement non-pharmacological measures as appropriate and evaluate response- Consider cultural and social influences on pain and pain management- Manage/alleviate anxiety- Utilize distraction and/or relaxation techniques- Monitor for opioid side effects- Notify MD/LIP if interventions unsuccessful or patient reports new pain- Anticipate increased pain with activity and pre-medicate as appropriate  Outcome: Progressing     Problem: RISK FOR INFECTION - ADULT  Goal: Absence of fever/infection during anticipated neutropenic period  Description: INTERVENTIONS- Monitor WBC- Administer growth factors as ordered- Implement neutropenic guidelines  Outcome: Progressing     Problem: SAFETY ADULT - FALL  Goal: Free from fall injury  Description: INTERVENTIONS:- Assess pt frequently for physical needs- Identify cognitive and physical deficits and behaviors that affect risk of falls.- [place] fall precautions as  indicated by assessment.- Educate pt/family on patient safety including physical limitations- Instruct pt to call for assistance with activity based on assessment- Modify environment to reduce risk of injury- Provide assistive devices as appropriate- Consider OT/PT consult to assist with strengthening/mobility- Encourage toileting schedule  Outcome: Progressing     Problem: DISCHARGE PLANNING  Goal: Discharge to home or other facility with appropriate resources  Description: INTERVENTIONS:- Identify barriers to discharge w/pt and caregiver- Include patient/family/discharge partner in discharge planning- Arrange for needed discharge resources and transportation as appropriate- Identify discharge learning needs (meds, wound care, etc)- Arrange for interpreters to assist at discharge as needed- Consider post-discharge preferences of patient/family/discharge partner- Complete POLST form as appropriate- Assess patient's ability to be responsible for managing their own health- Refer to Case Management Department for coordinating discharge planning if the patient needs post-hospital services based on physician/LIP order or complex needs related to functional status, cognitive ability or social support system  Outcome: Progressing     Problem: Patient/Family Goals  Goal: Patient/Family Long Term Goal  Description: Patient's Long Term Goal: Discharge home   Interventions:  - Pain management   -IVF   - IV abx   - See additional Care Plan goals for specific interventions  Outcome: Progressing  Goal: Patient/Family Short Term Goal  Description: Patient's Short Term Goal: Pain management   Interventions: - PRN pain medications   - See additional Care Plan goals for specific interventions  Outcome: Progressing

## 2025-04-11 NOTE — PROGRESS NOTES
[facility]   part of [facility]     Hospitalist Progress Note     Jared Nicole Patient Status:  Inpatient    1945 MRN [id number]   Location [facility] 3NW-A Attending Trent Linn,    Hosp Day # 3 PCP Eric Neil MD     Chief Complaint: Abdominal pain    Subjective:     Patient seen and examined. Breathing a bit better today. Minimal abdominal pain.     Objective:    Review of Systems:   A comprehensive review of systems was completed; pertinent positive and negatives stated in subjective.    Vital signs:  Temp:  [97.5 °F (36.4 °C)-98.9 °F (37.2 °C)] 98.1 °F (36.7 °C)  Pulse:  [] 76  Resp:  [16-20] 20  BP: (110-144)/(62-94) 110/62  SpO2:  [94 %-96 %] 94 %    Physical Exam:    General: No acute distress  Respiratory: No wheezes, no rhonchi  Cardiovascular: S1, S2, regular rate and rhythm  Abdomen: Soft, mild RUQ tenderness, non-distended, positive bowel sounds  Neuro: No new focal deficits.   Extremities: No edema    Diagnostic Data:    Labs:  Recent Labs   Lab 25  1121 25  1804 25  0540 25  0839 25  1356 04/10/25  0523 04/10/25  0809 25  0602 25  1025   WBC 11.3*  --  15.6*  --   --   --  14.0* 11.5*  --    HGB 15.4  --  14.0  --   --   --  13.9 13.4  --    MCV 91.0  --  91.6  --   --   --  91.5 90.9  --    .0  --  170.0  --   --   --  151.0 150.0  --    INR 3.12* 3.32*  --  4.08* 1.81* 1.56*  --   --  1.24*       Recent Labs   Lab 25  1121 25  0540 04/10/25  0707 25  0602   * 159* 130* 109*   BUN 15 15 16 18   CREATSERUM 1.13 1.34* 1.00 0.96   CA 9.1 9.1 9.2 9.1   ALB 4.3 4.1 3.9 3.9    139 139 141   K 4.1 4.4 3.8 3.5  3.5    105 106 104   CO2 24.0 25.0 25.0 26.0   ALKPHO 86 106  --  130*   * 215* 92* 55*   ALT 54* 224* 129* 91*   BILT 2.1* 5.5* 4.3* 3.6*   TP 7.2 6.6 6.5 6.6       Estimated Glomerular Filtration Rate: 79 mL/min/1.73m2 (result from lab).    Recent Labs   Lab  04/08/25  1121   TROPHS <3       Recent Labs   Lab 04/09/25  1356 04/10/25  0523 04/11/25  1025   PTP 21.1* 18.8* 15.7*   INR 1.81* 1.56* 1.24*              Microbiology    No results found for this visit on 04/08/25.      Imaging: Reviewed in Epic.    Medications: Scheduled Medications[1]    Assessment & Plan:      #Biliary colic  #Dilated CBD with filling defect   #Elevated LFTs  -S/p ERCP 4/9 with biliary stent placement   -Monitor LFTs - improving   -General surgery following - cholecystostomy planned given cardiac status     #Acute diastolic heart failure  #Acute pulmonary edema  #Mod MR/TR  #Pulmonary HTN  -Echo reviewed  -Diuresis per cardiology     #Chronic afib  -Coumadin reversed with vitamin K and FFP  -BB/cardizem  -Resume coumadin once procedures complete     #BPH  -Flomax     #CKDIII  -At baseline     #DL  -Statin on hold due to elevated LFTs    Trent Linn DO    Supplementary Documentation:     Quality:  DVT Mechanical Prophylaxis:   SCDs,    DVT Pharmacologic Prophylaxis   Medication   None                Code Status: Prior  Echols: No urinary catheter in place  Echols Duration (in days):   Central line:    DAT:     Discharge is dependent on: clinical progress  At this point Mr. Nicole is expected to be discharge to: home    The 21st Century Cures Act makes medical notes like these available to patients in the interest of transparency. Please be advised this is a medical document. Medical documents are intended to carry relevant information, facts as evident, and the clinical opinion of the practitioner. The medical note is intended as peer to peer communication and may appear blunt or direct. It is written in medical language and may contain abbreviations or verbiage that are unfamiliar.                         [1]    potassium chloride  40 mEq Oral Q4H    furosemide  40 mg Intravenous BID (Diuretic)    pantoprazole  40 mg Intravenous Q24H    dilTIAZem ER  240 mg Oral Daily    metoprolol tartrate  50  mg Oral 2x Daily(Beta Blocker)    tamsulosin  0.4 mg Oral Daily

## 2025-04-11 NOTE — PLAN OF CARE
A&Ox4. Telemetry: Afib 2L NC  Denies chest pain, shortness of breath GI: Abdomen soft, nondistended. Passing flatus : Voids. Denies pain. Up with standby assist. Lasix given per order. Educated on its se & why it's indicated. Also, let pt know urinal is nearby at bedside. Pt agreeable. Diet: NPO Saline locked per order. All appropriate safety measures in place.

## 2025-04-11 NOTE — PROGRESS NOTES
[facility] Cardiology  Consultation Note      Jared Nicole Patient Status:  Inpatient    1945 MRN [id number]   Location [facility] 3NW-A Attending Trent Linn,    Hosp Day # 3 PCP Eric Neil MD     Reason for consult: SOB    Events: Stable. Denies CP. Still some SOB but better. Weaned off O2. Leg swelling improved.     Primary cardiologist: Brayan     History of Present Illness:  Jared Nicole is a 79 year old male who presented to [facility] on 2025 with abdominal pain. Found to have dilated CBD now s/p ERCP and biliary stent . Planning cholecystectomy. Overnight c/o SOB and orthopnea, now on O2. No CP. + LE edema.      Medications:  Current Hospital Medications[1]    Past Medical History[2]    Past Surgical History[3]    Family History  family history includes Cancer in his mother; Lung Disorder in an other family member.    Social History   reports that he quit smoking about 43 years ago. His smoking use included cigarettes. He started smoking about 63 years ago. He has a 20 pack-year smoking history. He has never used smokeless tobacco. He reports current alcohol use. He reports that he does not use drugs.     Allergies  Allergies[4]    Review of Systems:  As per HPI, otherwise 10 point ROS is negative in detail.    Physical Exam:  Blood pressure 116/69, pulse 96, temperature 98.1 °F (36.7 °C), temperature source Oral, resp. rate 20, height 68\", weight 194 lb (88 kg), SpO2 93%.  Temp (24hrs), Av.2 °F (36.8 °C), Min:97.7 °F (36.5 °C), Max:98.9 °F (37.2 °C)    Wt Readings from Last 3 Encounters:   25 194 lb (88 kg)   24 197 lb (89.4 kg)   24 197 lb (89.4 kg)       General: Awake and alert; in no acute distress  HEENT: Extraocular movements are intact; sclerae are anicteric; scalp is atraumatic  Neck: Supple; no JVD; no carotid bruits  Cardiac: Irreg ireg, variable S1, nl S2,  no murmurs, rubs, or gallops are appreciated  Lungs: Clear to  auscultation bilaterally; no accessory muscle use is noted, no wheezes, rhonci or rales  Abdomen: Soft, non-distended, non-tender; bowel sounds are normoactive  Extremities: Warm, 1+ LE edema, clubbing or cyanosis; moves all 4 extremities normally, distal pulses intact and equal  Psychiatric: Normal mood and affect; answers questions appropriately  Dermatologic: No rashes; normal skin turgor    Diagnostic testing:    Labs:   Lab Results   Component Value Date    PT 26.4 (H) 04/04/2014    PT 24.7 (H) 04/03/2014    INR 1.24 (H) 04/11/2025    INR 1.56 (H) 04/10/2025        Lab Results   Component Value Date    WBC 11.5 04/11/2025    HGB 13.4 04/11/2025    HCT 39.9 04/11/2025    .0 04/11/2025    CREATSERUM 0.96 04/11/2025    BUN 18 04/11/2025     04/11/2025    K 3.5 04/11/2025    K 3.5 04/11/2025     04/11/2025    CO2 26.0 04/11/2025     04/11/2025    CA 9.1 04/11/2025    ALB 3.9 04/11/2025    ALKPHO 130 04/11/2025    BILT 3.6 04/11/2025    TP 6.6 04/11/2025    AST 55 04/11/2025    ALT 91 04/11/2025    INR 1.24 04/11/2025    PTP 15.7 04/11/2025       Cardiac diagnostics:    EKG 4/10/2025: AF RBBB    Echo 4/10/2025:  1. Left ventricle: The cavity size was normal. Wall thickness was normal.      Systolic function was normal. The estimated ejection fraction was 55-60%,      by visual assessment. No diagnostic evidence for regional wall motion      abnormalities. Unable to assess LV diastolic function due to heart      rhythm.   2. Right ventricle: The cavity size was increased.   3. Left atrium: The left atrial volume was moderately increased.   4. Right atrium: The atrium was markedly dilated.   5. Mitral valve: There was moderate regurgitation.   6. Tricuspid valve: There was moderate regurgitation.   7. Pulmonary arteries: Systolic pressure was mildly increased, in the range      of 40mm Hg to 45mm Hg. Estimated pulmonary artery diastolic pressure was      6mm Hg.   8. Pericardium,  extracardiac: There was a left pleural effusion.     CTA chest 4/10/2025  1. No acute pulmonary embolus.   2. Scattered bilateral ground-glass opacities which may represent pneumonia.   3. Mild interlobular septal thickening suggestive of edema.   4. Trace bilateral pleural effusions, right greater than left.   5. Mildly prominent mediastinal lymph nodes, likely reactive in nature.   6. There is mild gallbladder wall thickening.  Correlation with clinical symptoms to exclude acute cholecystitis.    7. Intrahepatic biliary air secondary to common bile duct stent partially visualized.       Impression:  79 year old male presenting with abdominal pain  Cholecystitis s/p biliary stent, now planning cholecystectomy  Acute hypoxemic resp failure due to acute HFpEF   Pers AF, rate controlled  CHADS VASC 3 on coumadin - reversed this admit  Mod MR/TR  Mild pulm HTN  COPD  H/o smoking  HLD    Recommendations:  Continue Lasix 40mg IV BID. Consider change to PO this weekend.   Weaned off O2 4/11  Given acute decompensated HF, surgery deferred. Plan for cholecystostomy tube today, then cholecystectomy in 6 weeks once more stable from CV stand point.   Rates fast today. Increase metoprolol to 75mg BID, continue cardizem 240mg daily  If coumadin will be on hold for extended period, consider heparin IV bridge  Statin on hold    Thank you for allowing our practice to participate in the care of your patient. Please do not hesitate to contact me if you have any questions.    Med Schmidt MD  Interventional Cardiology  [facility]  Office: [phone]         [1]   Current Facility-Administered Medications   Medication Dose Route Frequency    furosemide (Lasix) 10 mg/mL injection 40 mg  40 mg Intravenous BID (Diuretic)    HYDROcodone-acetaminophen (Norco) 5-325 MG per tab 1 tablet  1 tablet Oral Q6H PRN    pantoprazole (Protonix) 40 mg in sodium chloride 0.9% PF 10 mL IV push  40 mg Intravenous Q24H     dextromethorphan-guaiFENesin (Robitussin-DM)  mg/5mL oral solution 5 mL  5 mL Oral Q6H PRN    dilTIAZem ER (Dilacor XR) 24 hr cap 240 mg  240 mg Oral Daily    metoprolol tartrate (Lopressor) tab 50 mg  50 mg Oral 2x Daily(Beta Blocker)    tamsulosin (Flomax) cap 0.4 mg  0.4 mg Oral Daily    melatonin tab 3 mg  3 mg Oral Nightly PRN    ondansetron (Zofran) 4 MG/2ML injection 4 mg  4 mg Intravenous Q6H PRN    metoclopramide (Reglan) 5 mg/mL injection 5 mg  5 mg Intravenous Q8H PRN    acetaminophen (Tylenol Extra Strength) tab 500 mg  500 mg Oral Q4H PRN    HYDROmorphone (Dilaudid) 1 MG/ML injection 0.2 mg  0.2 mg Intravenous Q2H PRN    Or    HYDROmorphone (Dilaudid) 1 MG/ML injection 0.4 mg  0.4 mg Intravenous Q2H PRN    Or    HYDROmorphone (Dilaudid) 1 MG/ML injection 0.8 mg  0.8 mg Intravenous Q2H PRN    polyethylene glycol (PEG 3350) (Miralax) 17 g oral packet 17 g  17 g Oral Daily PRN    sennosides (Senokot) tab 17.2 mg  17.2 mg Oral Nightly PRN    bisacodyl (Dulcolax) 10 MG rectal suppository 10 mg  10 mg Rectal Daily PRN    fleet enema (Fleet) rectal enema 133 mL  1 enema Rectal Once PRN   [2]   Past Medical History:   Anemia    Arrhythmia    atrial fibrillation    Atrial fibrillation (HCC)    Cancer (HCC)    SKIN    CHF (congestive heart failure) (HCC)    mild     Colitis    DIVERTICULITIS    Colonic diverticular abscess    Congestive heart disease (HCC)    Hx musculoskletl dis NEC    BACK/DISC ISSUES    Long term (current) use of anticoagulants    Mitral regurgitation    Mild to moderate     Mitral valve prolapse    Monitoring for long-term anticoagulant use    Other and unspecified hyperlipidemia    Perforated bowel (HCC)    Prediabetes    SOB (shortness of breath)    Unspecified essential hypertension    Visual impairment   [3]   Past Surgical History:  Procedure Laterality Date    Colectomy      Colon surgery      Colonoscopy  4/2016    No polyps/inflammation noted, moderate to severe diverticulosis.  Fecal transplant completed.    Colostomy      with reversal 2012    Other surgical history Left 2001    eye surgery, detached retina    Other surgical history N/A 4/12/2016    Procedure: COLONOSCOPY W/FECAL MICROBIOTA TRANSPLANT;  Surgeon: Dave Valle MD;  Location:  ENDOSCOPY   [4] No Known Allergies

## 2025-04-11 NOTE — PROGRESS NOTES
Assumed care of the patient from Luis Felipe at 0130.    Patient maintained NPO for possible procedure with surgery service. According to Luis Felipe, patient was upset about receiving IV Lasix late in the evening. Charge RN reported that she had explained that the second dose of IV lasix was a new order entered by cardiology and that the patient will receive lasix at 9 am and 5 pm going forward.    No acute changes noted in the patient's status. Will continue to wean O2 as tolerated by the patient.

## 2025-04-11 NOTE — CONSULTS
[facility] Cardiology  Consultation Note      Jared Nicole Patient Status:  Inpatient    1945 MRN [id number]   Location [facility] 3NW-A Attending Trent Linn,    Hosp Day # 2 PCP Eric Neil MD     Reason for consult: SOB    Primary cardiologist: Brayan     History of Present Illness:  Jared Nicole is a 79 year old male who presented to [facility] on 2025 with abdominal pain. Found to have dilated CBD now s/p ERCP and biliary stent . Planning cholecystectomy. Overnight c/o SOB and orthopnea, now on O2. No CP. + LE edema.      Medications:  Current Hospital Medications[1]    Past Medical History[2]    Past Surgical History[3]    Family History  family history includes Cancer in his mother; Lung Disorder in an other family member.    Social History   reports that he quit smoking about 43 years ago. His smoking use included cigarettes. He started smoking about 63 years ago. He has a 20 pack-year smoking history. He has never used smokeless tobacco. He reports current alcohol use. He reports that he does not use drugs.     Allergies  Allergies[4]    Review of Systems:  As per HPI, otherwise 10 point ROS is negative in detail.    Physical Exam:  Blood pressure 128/66, pulse 86, temperature 98.9 °F (37.2 °C), temperature source Oral, resp. rate 19, height 68\", weight 194 lb (88 kg), SpO2 96%.  Temp (24hrs), Av.9 °F (36.6 °C), Min:97.3 °F (36.3 °C), Max:98.9 °F (37.2 °C)    Wt Readings from Last 3 Encounters:   25 194 lb (88 kg)   24 197 lb (89.4 kg)   24 197 lb (89.4 kg)       General: Awake and alert; in no acute distress  HEENT: Extraocular movements are intact; sclerae are anicteric; scalp is atraumatic  Neck: Supple; no JVD; no carotid bruits  Cardiac: Irreg ireg, variable S1, nl S2,  no murmurs, rubs, or gallops are appreciated  Lungs: Clear to auscultation bilaterally; no accessory muscle use is noted, no wheezes, rhonci or rales  Abdomen: Soft,  non-distended, non-tender; bowel sounds are normoactive  Extremities: Warm, 1+ LE edema, clubbing or cyanosis; moves all 4 extremities normally, distal pulses intact and equal  Psychiatric: Normal mood and affect; answers questions appropriately  Dermatologic: No rashes; normal skin turgor    Diagnostic testing:    Labs:   Lab Results   Component Value Date    PT 26.4 (H) 04/04/2014    PT 24.7 (H) 04/03/2014    INR 1.81 (H) 04/09/2025    INR 4.08 (H) 04/09/2025        Lab Results   Component Value Date    WBC 14.0 04/10/2025    HGB 13.9 04/10/2025    HCT 40.7 04/10/2025    .0 04/10/2025    CREATSERUM 1.00 04/10/2025    BUN 16 04/10/2025     04/10/2025    K 3.8 04/10/2025     04/10/2025    CO2 25.0 04/10/2025     04/10/2025    CA 9.2 04/10/2025    ALB 3.9 04/10/2025    BILT 4.3 04/10/2025    TP 6.5 04/10/2025    AST 92 04/10/2025     04/10/2025    DDIMER 4.10 04/10/2025       Cardiac diagnostics:    EKG 4/10/2025: AF RBBB    Echo 4/10/2025:  1. Left ventricle: The cavity size was normal. Wall thickness was normal.      Systolic function was normal. The estimated ejection fraction was 55-60%,      by visual assessment. No diagnostic evidence for regional wall motion      abnormalities. Unable to assess LV diastolic function due to heart      rhythm.   2. Right ventricle: The cavity size was increased.   3. Left atrium: The left atrial volume was moderately increased.   4. Right atrium: The atrium was markedly dilated.   5. Mitral valve: There was moderate regurgitation.   6. Tricuspid valve: There was moderate regurgitation.   7. Pulmonary arteries: Systolic pressure was mildly increased, in the range      of 40mm Hg to 45mm Hg. Estimated pulmonary artery diastolic pressure was      6mm Hg.   8. Pericardium, extracardiac: There was a left pleural effusion.     CTA chest 4/10/2025  1. No acute pulmonary embolus.   2. Scattered bilateral ground-glass opacities which may represent  pneumonia.   3. Mild interlobular septal thickening suggestive of edema.   4. Trace bilateral pleural effusions, right greater than left.   5. Mildly prominent mediastinal lymph nodes, likely reactive in nature.   6. There is mild gallbladder wall thickening.  Correlation with clinical symptoms to exclude acute cholecystitis.    7. Intrahepatic biliary air secondary to common bile duct stent partially visualized.       Impression:  79 year old male presenting with abdominal pain  Cholecystitis s/p biliary stent, now planning cholecystectomy  Acute hypoxemic resp failure due to acute HFpEF   Pers AF, rate controlled  CHADS VASC 3 on coumadin - reversed this admit  Mod MR/TR  Mild pulm HTN  COPD  H/o smoking  HLD    Recommendations:  Lasix 40mg IV BID  Wean O2  Given acute decompensated HF, he is at high CV risk for surgery. If surgery is not emergent, would allow several days for diuresis until closer to euvolemia.   Rate control: metoprolol, cardizem  If coumadin will be on hold for extended period, consider heparin IV bridge  Statin on hold    Thank you for allowing our practice to participate in the care of your patient. Please do not hesitate to contact me if you have any questions.    Med Schmidt MD  Interventional Cardiology  [facility]  Office: [phone]         [1]   Current Facility-Administered Medications   Medication Dose Route Frequency    HYDROcodone-acetaminophen (Norco) 5-325 MG per tab 1 tablet  1 tablet Oral Q6H PRN    pantoprazole (Protonix) 40 mg in sodium chloride 0.9% PF 10 mL IV push  40 mg Intravenous Q24H    dextromethorphan-guaiFENesin (Robitussin-DM)  mg/5mL oral solution 5 mL  5 mL Oral Q6H PRN    dilTIAZem ER (Dilacor XR) 24 hr cap 240 mg  240 mg Oral Daily    metoprolol tartrate (Lopressor) tab 50 mg  50 mg Oral 2x Daily(Beta Blocker)    tamsulosin (Flomax) cap 0.4 mg  0.4 mg Oral Daily    melatonin tab 3 mg  3 mg Oral Nightly PRN    ondansetron (Zofran) 4 MG/2ML  injection 4 mg  4 mg Intravenous Q6H PRN    metoclopramide (Reglan) 5 mg/mL injection 5 mg  5 mg Intravenous Q8H PRN    acetaminophen (Tylenol Extra Strength) tab 500 mg  500 mg Oral Q4H PRN    HYDROmorphone (Dilaudid) 1 MG/ML injection 0.2 mg  0.2 mg Intravenous Q2H PRN    Or    HYDROmorphone (Dilaudid) 1 MG/ML injection 0.4 mg  0.4 mg Intravenous Q2H PRN    Or    HYDROmorphone (Dilaudid) 1 MG/ML injection 0.8 mg  0.8 mg Intravenous Q2H PRN    polyethylene glycol (PEG 3350) (Miralax) 17 g oral packet 17 g  17 g Oral Daily PRN    sennosides (Senokot) tab 17.2 mg  17.2 mg Oral Nightly PRN    bisacodyl (Dulcolax) 10 MG rectal suppository 10 mg  10 mg Rectal Daily PRN    fleet enema (Fleet) rectal enema 133 mL  1 enema Rectal Once PRN   [2]   Past Medical History:   Anemia    Arrhythmia    atrial fibrillation    Atrial fibrillation (HCC)    Cancer (HCC)    SKIN    CHF (congestive heart failure) (HCC)    mild     Colitis    DIVERTICULITIS    Colonic diverticular abscess    Congestive heart disease (HCC)    Hx musculoskletl dis NEC    BACK/DISC ISSUES    Long term (current) use of anticoagulants    Mitral regurgitation    Mild to moderate     Mitral valve prolapse    Monitoring for long-term anticoagulant use    Other and unspecified hyperlipidemia    Perforated bowel (HCC)    Prediabetes    SOB (shortness of breath)    Unspecified essential hypertension    Visual impairment   [3]   Past Surgical History:  Procedure Laterality Date    Colectomy      Colon surgery      Colonoscopy  4/2016    No polyps/inflammation noted, moderate to severe diverticulosis. Fecal transplant completed.    Colostomy      with reversal 2012    Other surgical history Left 2001    eye surgery, detached retina    Other surgical history N/A 4/12/2016    Procedure: COLONOSCOPY W/FECAL MICROBIOTA TRANSPLANT;  Surgeon: Dave Valle MD;  Location:  ENDOSCOPY   [4] No Known Allergies

## 2025-04-12 LAB
ALBUMIN SERPL-MCNC: 4.2 G/DL (ref 3.2–4.8)
ALBUMIN/GLOB SERPL: 1.5 {RATIO} (ref 1–2)
ALP LIVER SERPL-CCNC: 127 U/L (ref 45–117)
ALT SERPL-CCNC: 68 U/L (ref 10–49)
ANION GAP SERPL CALC-SCNC: 10 MMOL/L (ref 0–18)
AST SERPL-CCNC: 42 U/L (ref ?–34)
BILIRUB SERPL-MCNC: 3.2 MG/DL (ref 0.2–1.1)
BUN BLD-MCNC: 20 MG/DL (ref 9–23)
CALCIUM BLD-MCNC: 9.4 MG/DL (ref 8.7–10.6)
CHLORIDE SERPL-SCNC: 103 MMOL/L (ref 98–112)
CO2 SERPL-SCNC: 28 MMOL/L (ref 21–32)
CREAT BLD-MCNC: 1.25 MG/DL (ref 0.7–1.3)
EGFRCR SERPLBLD CKD-EPI 2021: 58 ML/MIN/1.73M2 (ref 60–?)
GLOBULIN PLAS-MCNC: 2.8 G/DL (ref 2–3.5)
GLUCOSE BLD-MCNC: 120 MG/DL (ref 70–99)
MAGNESIUM SERPL-MCNC: 2.1 MG/DL (ref 1.6–2.6)
OSMOLALITY SERPL CALC.SUM OF ELEC: 296 MOSM/KG (ref 275–295)
PHOSPHATE SERPL-MCNC: 2.3 MG/DL (ref 2.4–5.1)
POTASSIUM SERPL-SCNC: 3.8 MMOL/L (ref 3.5–5.1)
PROT SERPL-MCNC: 7 G/DL (ref 5.7–8.2)
SODIUM SERPL-SCNC: 141 MMOL/L (ref 136–145)

## 2025-04-12 PROCEDURE — 99232 SBSQ HOSP IP/OBS MODERATE 35: CPT | Performed by: HOSPITALIST

## 2025-04-12 RX ORDER — ACETAMINOPHEN 500 MG
500 TABLET ORAL EVERY 4 HOURS PRN
Status: DISCONTINUED | OUTPATIENT
Start: 2025-04-12 | End: 2025-04-15

## 2025-04-12 NOTE — PROGRESS NOTES
Cardizem bolus given. Cardizem gtt started. Full report given to Nancy HUSTON on telemetry & update GI and SX team of transfer in the AM. Attempted to call patient son and wife, voicemail left with son cell phone. Will attempt again in the morning.      Transferring to room 8621.

## 2025-04-12 NOTE — PLAN OF CARE
Pt returned to floor stable condition; AOx4. Fast HR, AFIB on monitor continues. Bilat LE edema, non-pitting; Lasix continued this evening. Voids into bedside urinal usually, DTV since start of shift. Pt reports mild pain. 4 L o2 continued from IR procedure, denies SOB/Lightheadedness. Incentive spirometer pt achieves 500 ml. RUQ heath drain insertion site remains covered with gauze and tegaderm; dressing c/d/I. IV SL. Clear liquids tolerated well, no nausea. Pain managed per MAR. Up SB and walker, steady gait. HR increases with ambulation. Fall & safety precautions in place. POC discussed.

## 2025-04-12 NOTE — PROGRESS NOTES
Name:Jared Nicole  MRN#:[id number]  :1945      Subjective:  Occasional abdominal pain.  No drain related complaints.     Objective:  RUQ drain site is CDI    Vital Signs:  Blood pressure 122/71, pulse 110, temperature 98.1 °F (36.7 °C), temperature source Oral, resp. rate 18, height 68\", weight 188 lb 15 oz, SpO2 94%.    Input/Output:    Intake/Output Summary (Last 24 hours) at 2025 1353  Last data filed at 2025 1330  Gross per 24 hour   Intake 1326.87 ml   Output 1305 ml   Net 21.87 ml     24 hour drain output: 30 ml bile with sediment.    Labs:       Microbiology:  G + cocci and Gram - rods.  Culture pending.     Assessment/Plan:  78 yo with acute cholecystitis.  S/p percutaneous cholecystostomy placed on 25. Bacteria in bile.  Cultures pending.  Unless cholecystectomy is planned before 6 weeks, the gb drain needs to be left in place a minimum of 6 weeks. If drain is going to be left in long term then it should be exchanged q 3 months. Would recommend cholecystostogram before removal.     Gianni Vang MD  2025  1:53 PM

## 2025-04-12 NOTE — PROGRESS NOTES
Cardiology Progress Note    [facility]   part of [facility]      Jared Nicole Patient Status:  Inpatient    1945 MRN [id number]   Location [facility] 8NE-A Attending Trent Linn,    Hosp Day # 4 PCP Eric Neil MD     Impression:  79 year old male presenting with abdominal pain  Cholecystitis s/p biliary stent, now planning cholecystectomy as OP  Acute hypoxemic resp failure due to acute HFpEF    - Negative 1.9L   - weight from 194 lb on admission to 188 lb this morning   - Currently on 4L O2  Pers AF, rate controlled  CHADS VASC 3 on coumadin - reversed this admit  Mod MR/TR  Mild pulm HTN  COPD  H/o smoking  HLD    Recommendations:  Continue Lasix 40mg IV BID. Consider change to PO tomorrow.   BMP pending  Daily weights/ strict I&Os  Given acute decompensated HF, surgery deferred. Cholecystostomy placed. Plan for cholecystectomy in 6 weeks once more stable from CV stand point.   Cardizem gtt started overnight. Rate reasonably controlled although there were some issues with his IV so gtt was off for a while.   Continue metoprolol tartrate 75 mg BID  Restart coumadin when OK per surgery/ IR. If coumadin will be on hold for extended period, consider heparin IV bridge  Statin on hold for elevated LFTs    Subjective: Feeling tired this morning. No chest pain.  No shortness of breath.  No new focal cardiovascular complaints reported.     Objective:     Medications:  Current Hospital Medications[1]    Past Medical History[2]    Past Surgical History[3]    Family History  family history includes Cancer in his mother; Lung Disorder in an other family member.    Social History   reports that he quit smoking about 43 years ago. His smoking use included cigarettes. He started smoking about 63 years ago. He has a 20 pack-year smoking history. He has never used smokeless tobacco. He reports current alcohol use. He reports that he does not use drugs.     Allergies  Allergies[4]    Review of  Systems:  As per HPI, otherwise 10 point ROS is negative in detail.    Physical Exam:  Blood pressure 115/74, pulse 90, temperature 97.6 °F (36.4 °C), temperature source Oral, resp. rate 16, height 5' 8\" (1.727 m), weight 188 lb 15 oz (85.7 kg), SpO2 98%.  Temp (24hrs), Av.9 °F (36.6 °C), Min:97.6 °F (36.4 °C), Max:98.1 °F (36.7 °C)    Wt Readings from Last 3 Encounters:   25 188 lb 15 oz (85.7 kg)   24 197 lb (89.4 kg)   24 197 lb (89.4 kg)       General: Awake and alert; in no acute distress  HEENT: Extraocular movements are intact; sclerae are anicteric; scalp is atraumatic  Neck: Supple; no JVD; no carotid bruits  Cardiac: Irreg ireg, variable S1, nl S2,  no murmurs, rubs, or gallops are appreciated  Lungs: Clear to auscultation bilaterally; no accessory muscle use is noted, no wheezes, rhonci or rales  Abdomen: Soft, non-distended, non-tender; bowel sounds are normoactive  Extremities: Warm, 1+ LE edema, clubbing or cyanosis; moves all 4 extremities normally, distal pulses intact and equal  Psychiatric: Normal mood and affect; answers questions appropriately  Dermatologic: No rashes; normal skin turgor    Diagnostic testing:    Labs:   Lab Results   Component Value Date    PT 26.4 (H) 2014    PT 24.7 (H) 2014    INR 1.24 (H) 2025    INR 1.56 (H) 04/10/2025        Lab Results   Component Value Date    K 4.2 2025    INR 1.24 2025    PTP 15.7 2025    MG 2.1 2025    PHOS 2.3 2025       Cardiac diagnostics:    EKG 4/10/2025: AF RBBB    Echo 4/10/2025:  1. Left ventricle: The cavity size was normal. Wall thickness was normal.      Systolic function was normal. The estimated ejection fraction was 55-60%,      by visual assessment. No diagnostic evidence for regional wall motion      abnormalities. Unable to assess LV diastolic function due to heart      rhythm.   2. Right ventricle: The cavity size was increased.   3. Left atrium: The left atrial  volume was moderately increased.   4. Right atrium: The atrium was markedly dilated.   5. Mitral valve: There was moderate regurgitation.   6. Tricuspid valve: There was moderate regurgitation.   7. Pulmonary arteries: Systolic pressure was mildly increased, in the range      of 40mm Hg to 45mm Hg. Estimated pulmonary artery diastolic pressure was      6mm Hg.   8. Pericardium, extracardiac: There was a left pleural effusion.     CTA chest 4/10/2025  1. No acute pulmonary embolus.   2. Scattered bilateral ground-glass opacities which may represent pneumonia.   3. Mild interlobular septal thickening suggestive of edema.   4. Trace bilateral pleural effusions, right greater than left.   5. Mildly prominent mediastinal lymph nodes, likely reactive in nature.   6. There is mild gallbladder wall thickening.  Correlation with clinical symptoms to exclude acute cholecystitis.    7. Intrahepatic biliary air secondary to common bile duct stent partially visualized.         Thank you for allowing our practice to participate in the care of your patient. Please do not hesitate to contact me if you have any questions.      Nikki Matos, ANIVAL  4/12/2025  8:05 AM         [1]   Current Facility-Administered Medications   Medication Dose Route Frequency    acetaminophen (Tylenol Extra Strength) tab 500 mg  500 mg Oral Q4H PRN    sodium phosphate 15 mmol in 0.9% NaCl 100mL IVPB premix  15 mmol Intravenous Once    metoprolol tartrate (Lopressor) tab 75 mg  75 mg Oral 2x Daily(Beta Blocker)    metoprolol (Lopressor) 5 mg/5mL injection 5 mg  5 mg Intravenous Q6H PRN    dilTIAZem 10 mg BOLUS FROM BAG infusion  10 mg Intravenous Q1H PRN    dilTIAZem (cardIZEM) 100 mg in sodium chloride 0.9% 100 mL IVPB-ADDV  2.5-20 mg/hr Intravenous Continuous    furosemide (Lasix) 10 mg/mL injection 40 mg  40 mg Intravenous BID (Diuretic)    HYDROcodone-acetaminophen (Norco) 5-325 MG per tab 1 tablet  1 tablet Oral Q6H PRN    pantoprazole (Protonix) 40 mg  in sodium chloride 0.9% PF 10 mL IV push  40 mg Intravenous Q24H    dextromethorphan-guaiFENesin (Robitussin-DM)  mg/5mL oral solution 5 mL  5 mL Oral Q6H PRN    tamsulosin (Flomax) cap 0.4 mg  0.4 mg Oral Daily    melatonin tab 3 mg  3 mg Oral Nightly PRN    ondansetron (Zofran) 4 MG/2ML injection 4 mg  4 mg Intravenous Q6H PRN    metoclopramide (Reglan) 5 mg/mL injection 5 mg  5 mg Intravenous Q8H PRN    HYDROmorphone (Dilaudid) 1 MG/ML injection 0.2 mg  0.2 mg Intravenous Q2H PRN    Or    HYDROmorphone (Dilaudid) 1 MG/ML injection 0.4 mg  0.4 mg Intravenous Q2H PRN    Or    HYDROmorphone (Dilaudid) 1 MG/ML injection 0.8 mg  0.8 mg Intravenous Q2H PRN    polyethylene glycol (PEG 3350) (Miralax) 17 g oral packet 17 g  17 g Oral Daily PRN    sennosides (Senokot) tab 17.2 mg  17.2 mg Oral Nightly PRN    bisacodyl (Dulcolax) 10 MG rectal suppository 10 mg  10 mg Rectal Daily PRN    fleet enema (Fleet) rectal enema 133 mL  1 enema Rectal Once PRN   [2]   Past Medical History:   Anemia    Arrhythmia    atrial fibrillation    Atrial fibrillation (HCC)    Cancer (HCC)    SKIN    CHF (congestive heart failure) (HCC)    mild     Colitis    DIVERTICULITIS    Colonic diverticular abscess    Congestive heart disease (HCC)    Hx musculoskletl dis NEC    BACK/DISC ISSUES    Long term (current) use of anticoagulants    Mitral regurgitation    Mild to moderate     Mitral valve prolapse    Monitoring for long-term anticoagulant use    Other and unspecified hyperlipidemia    Perforated bowel (HCC)    Prediabetes    SOB (shortness of breath)    Unspecified essential hypertension    Visual impairment   [3]   Past Surgical History:  Procedure Laterality Date    Colectomy      Colon surgery      Colonoscopy  4/2016    No polyps/inflammation noted, moderate to severe diverticulosis. Fecal transplant completed.    Colostomy      with reversal 2012    Other surgical history Left 2001    eye surgery, detached retina    Other surgical  history N/A 4/12/2016    Procedure: COLONOSCOPY W/FECAL MICROBIOTA TRANSPLANT;  Surgeon: Dave Valle MD;  Location:  ENDOSCOPY   [4] No Known Allergies

## 2025-04-12 NOTE — PROGRESS NOTES
HR increasing at rest. MD hospitalist aware and placed orders. Cardiologist paged and aware; Cardizem Gtt ordered, orders to transfer to cardiac-tele for titration.

## 2025-04-12 NOTE — PLAN OF CARE
Shift Note:  Patient alert and oriented x4. Very pleasant, spouse at the bedside.   Patient weaned down to room air, lizz >92%, denies difficulty breathing, lung sounds diminished.   Denies any cardiac symptoms, AFib on tele, heart rate ranging from 100-120s. Denies pain at this time.   Continent of bowel and bladder, last BM 4/11.   Ambulates with standby assist, call light within reach, tolerating care well.     POC:  - Cardizem gtts for rate control  - IV Lasix BID / Daily weight   - IV ABX         Problem: PAIN - ADULT  Goal: Verbalizes/displays adequate comfort level or patient's stated pain goal  Description: INTERVENTIONS:- Encourage pt to monitor pain and request assistance- Assess pain using appropriate pain scale- Administer analgesics based on type and severity of pain and evaluate response- Implement non-pharmacological measures as appropriate and evaluate response- Consider cultural and social influences on pain and pain management- Manage/alleviate anxiety- Utilize distraction and/or relaxation techniques- Monitor for opioid side effects- Notify MD/LIP if interventions unsuccessful or patient reports new pain- Anticipate increased pain with activity and pre-medicate as appropriate  Outcome: Progressing     Problem: RISK FOR INFECTION - ADULT  Goal: Absence of fever/infection during anticipated neutropenic period  Description: INTERVENTIONS- Monitor WBC- Administer growth factors as ordered- Implement neutropenic guidelines  Outcome: Progressing     Problem: SAFETY ADULT - FALL  Goal: Free from fall injury  Description: INTERVENTIONS:- Assess pt frequently for physical needs- Identify cognitive and physical deficits and behaviors that affect risk of falls.- [place] fall precautions as indicated by assessment.- Educate pt/family on patient safety including physical limitations- Instruct pt to call for assistance with activity based on assessment- Modify environment to reduce risk of injury- Provide  assistive devices as appropriate- Consider OT/PT consult to assist with strengthening/mobility- Encourage toileting schedule  Outcome: Progressing     Problem: DISCHARGE PLANNING  Goal: Discharge to home or other facility with appropriate resources  Description: INTERVENTIONS:- Identify barriers to discharge w/pt and caregiver- Include patient/family/discharge partner in discharge planning- Arrange for needed discharge resources and transportation as appropriate- Identify discharge learning needs (meds, wound care, etc)- Arrange for interpreters to assist at discharge as needed- Consider post-discharge preferences of patient/family/discharge partner- Complete POLST form as appropriate- Assess patient's ability to be responsible for managing their own health- Refer to Case Management Department for coordinating discharge planning if the patient needs post-hospital services based on physician/LIP order or complex needs related to functional status, cognitive ability or social support system  Outcome: Progressing     Problem: Patient/Family Goals  Goal: Patient/Family Long Term Goal  Description: Patient's Long Term Goal: Discharge home   Interventions:  - Pain management   -IVF   - IV abx   - See additional Care Plan goals for specific interventions  Outcome: Progressing  Goal: Patient/Family Short Term Goal  Description: Patient's Short Term Goal: Pain management   Interventions: - PRN pain medications   - See additional Care Plan goals for specific interventions  Outcome: Progressing

## 2025-04-12 NOTE — PROCEDURES
[facility]    Jared Nicole Patient Status:  Inpatient    1945 MRN [id number]   Location [facility] INTERVENTIONAL SUITES Attending Trent Linn DO   Hosp Day # 3 PCP Eric Neil MD         Brief Procedure Report    Pre-Operative Diagnosis: Acute cholecystitis    Post-Operative Diagnosis: Same as above.    Procedure Performed: Percutaneous cholecystostomy catheter placement    Anesthesia: 1% lidocaine, Moderate sedation    EBL: 0    Complications: None    Summary of Case: 8.5 Fr MPD placed into GB with US and fluoro guidance. Transhepatic approach.  Patient tolerated procedure well without immediate complication. Full report to follow in PACS.    Gianni Vang

## 2025-04-12 NOTE — PLAN OF CARE
Received patient from room 317 to room 8621. Alert and oriented. On O2 at 4L, breath sounds diminished. Cardizem gtt infusing at 2.5mg /hour. On tele, A fib.  0100 Cardizem gtt adjusted after bolus given. HR  130s to 140s.  0430 medicated with Tylenol for c/o spasms at drain site. Also given a warm pack with relief.  0530 Cardizem gtt adjusted after bolus given. [id number] to 140s.  Dr Dias and Dr Jolley notified of room change.

## 2025-04-12 NOTE — PROGRESS NOTES
Son Alfredo returned phone call, aware of room change. Updated on patient's condition. Denies any questions at this time

## 2025-04-12 NOTE — PROCEDURES
[facility]  Pre-Procedure Note    Name: Jared Nicole  MRN#: [id number]  : 1945    Procedure:  Percutaneous cholecystostomy    Indication: Acute cholecystitis    Allergies:    Allergies[1]    Pertinent Medications:    Is patient on any Aspirin, Coumadin, or any other Anticoagulations/Antiplatelet medications?  no      Mental Status:  Alert and Oriented      Health Status: Acceptable for Procedure    Impression and Plans:    Acute cholecystitis.  Not currently a surgical candidate.  For percutaneous cholecystostomy catheter placement.     I have reviewed the above information prior to procedure.    I have discussed the risks and benefits and alternatives with the patient.  The patient understands and agrees to proceed with plan of care.    Gianni Vang MD           [1] No Known Allergies

## 2025-04-12 NOTE — PROGRESS NOTES
[facility]   part of [facility]     Hospitalist Progress Note     Jared Nicole Patient Status:  Inpatient    1945 MRN [id number]   Location [facility] 3NW-A Attending Trent Linn,    Hosp Day # 4 PCP Eric Neil MD     Chief Complaint: Abdominal pain    Subjective:     Patient seen and examined. Overnight events noted. Denies CP/palpitations. SOB improved.     Objective:    Review of Systems:   A comprehensive review of systems was completed; pertinent positive and negatives stated in subjective.    Vital signs:  Temp:  [97.6 °F (36.4 °C)-98.1 °F (36.7 °C)] 98.1 °F (36.7 °C)  Pulse:  [] 110  Resp:  [16-19] 18  BP: (111-127)/() 122/71  SpO2:  [93 %-98 %] 94 %    Physical Exam:    General: No acute distress  Respiratory: No wheezes, no rhonchi  Cardiovascular: Irregularly irregular.   Abdomen: Soft, mild RUQ tenderness, non-distended, positive bowel sounds  Neuro: No new focal deficits.   Extremities: No edema    Diagnostic Data:    Labs:  Recent Labs   Lab 25  1121 25  1804 25  0540 25  0839 25  1356 04/10/25  0523 04/10/25  0809 25  0625  1025   WBC 11.3*  --  15.6*  --   --   --  14.0* 11.5*  --    HGB 15.4  --  14.0  --   --   --  13.9 13.4  --    MCV 91.0  --  91.6  --   --   --  91.5 90.9  --    .0  --  170.0  --   --   --  151.0 150.0  --    INR 3.12* 3.32*  --  4.08* 1.81* 1.56*  --   --  1.24*       Recent Labs   Lab 25  0540 04/10/25  0707 25  0602 25  0903   * 130* 109*  --  120*   BUN 15 16 18  --  20   CREATSERUM 1.34* 1.00 0.96  --  1.25   CA 9.1 9.2 9.1  --  9.4   ALB 4.1 3.9 3.9  --  4.2    139 141  --  141   K 4.4 3.8 3.5  3.5 4.2 3.8    106 104  --  103   CO2 25.0 25.0 26.0  --  28.0   ALKPHO 106  --  130*  --  127*   * 92* 55*  --  42*   * 129* 91*  --  68*   BILT 5.5* 4.3* 3.6*  --  3.2*   TP 6.6 6.5 6.6  --  7.0       Estimated  Glomerular Filtration Rate: 58 mL/min/1.73m2 (A) (result from lab).    Recent Labs   Lab 04/08/25  1121   TROPHS <3       Recent Labs   Lab 04/09/25  1356 04/10/25  0523 04/11/25  1025   PTP 21.1* 18.8* 15.7*   INR 1.81* 1.56* 1.24*              Microbiology    Hospital Encounter on 04/08/25   1. Body Fluid Cult Aerobic and Anaerobic     Status: Abnormal (Preliminary result)    Collection Time: 04/11/25  7:25 PM    Specimen: Abdominal fluid; Body fluid, unspecified   Result Value Ref Range    Body Fluid Smear Gram stain of positive bottle shows: (A) N/A    Body Fluid Smear Gram Positive Cocci (A) N/A    Body Fluid Smear Gram Negative Rods (A) N/A         Imaging: Reviewed in Epic.    Medications: Scheduled Medications[1]    Assessment & Plan:      #Biliary colic  #Dilated CBD with filling defect   #Elevated LFTs  -S/p ERCP 4/9 with biliary stent placement   -Monitor LFTs - improving   -General surgery following - s/p cholecystostomy 4/11  -Culture from cholecystostomy GNR/GPC    #Acute diastolic heart failure  #Acute pulmonary edema  #Mod MR/TR  #Pulmonary HTN  -Echo reviewed  -Diuresis per cardiology     #Chronic afib with RVR  -Cardizem gtt  -Coumadin reversed with vitamin K and FFP  -Resume coumadin once procedures complete     #BPH  -Flomax     #CKDIII  -At baseline     #DL  -Statin on hold due to elevated LFTs    Trent Linn,     Supplementary Documentation:     Quality:  DVT Mechanical Prophylaxis:   SCDs,    DVT Pharmacologic Prophylaxis   Medication   None                Code Status: Prior  Echols: No urinary catheter in place  Echols Duration (in days):   Central line:    DAT: 4/14/2025    Discharge is dependent on: clinical progress  At this point Mr. Nicole is expected to be discharge to: home    The 21st Century Cures Act makes medical notes like these available to patients in the interest of transparency. Please be advised this is a medical document. Medical documents are intended to carry relevant  information, facts as evident, and the clinical opinion of the practitioner. The medical note is intended as peer to peer communication and may appear blunt or direct. It is written in medical language and may contain abbreviations or verbiage that are unfamiliar.                         [1]    sodium phosphate  15 mmol Intravenous Once    metoprolol tartrate  75 mg Oral 2x Daily(Beta Blocker)    furosemide  40 mg Intravenous BID (Diuretic)    pantoprazole  40 mg Intravenous Q24H    tamsulosin  0.4 mg Oral Daily

## 2025-04-13 LAB
ALBUMIN SERPL-MCNC: 3.7 G/DL (ref 3.2–4.8)
ALBUMIN/GLOB SERPL: 1.4 {RATIO} (ref 1–2)
ALP LIVER SERPL-CCNC: 109 U/L (ref 45–117)
ALT SERPL-CCNC: 50 U/L (ref 10–49)
ANION GAP SERPL CALC-SCNC: 10 MMOL/L (ref 0–18)
AST SERPL-CCNC: 29 U/L (ref ?–34)
BILIRUB SERPL-MCNC: 2.8 MG/DL (ref 0.2–1.1)
BUN BLD-MCNC: 20 MG/DL (ref 9–23)
CALCIUM BLD-MCNC: 8.8 MG/DL (ref 8.7–10.6)
CHLORIDE SERPL-SCNC: 102 MMOL/L (ref 98–112)
CO2 SERPL-SCNC: 27 MMOL/L (ref 21–32)
CREAT BLD-MCNC: 1.14 MG/DL (ref 0.7–1.3)
EGFRCR SERPLBLD CKD-EPI 2021: 65 ML/MIN/1.73M2 (ref 60–?)
GLOBULIN PLAS-MCNC: 2.7 G/DL (ref 2–3.5)
GLUCOSE BLD-MCNC: 106 MG/DL (ref 70–99)
OSMOLALITY SERPL CALC.SUM OF ELEC: 291 MOSM/KG (ref 275–295)
PHOSPHATE SERPL-MCNC: 3.6 MG/DL (ref 2.4–5.1)
POTASSIUM SERPL-SCNC: 3 MMOL/L (ref 3.5–5.1)
POTASSIUM SERPL-SCNC: 3.6 MMOL/L (ref 3.5–5.1)
PROT SERPL-MCNC: 6.4 G/DL (ref 5.7–8.2)
SODIUM SERPL-SCNC: 139 MMOL/L (ref 136–145)

## 2025-04-13 PROCEDURE — 99232 SBSQ HOSP IP/OBS MODERATE 35: CPT | Performed by: HOSPITALIST

## 2025-04-13 PROCEDURE — 99232 SBSQ HOSP IP/OBS MODERATE 35: CPT | Performed by: SURGERY

## 2025-04-13 RX ORDER — FUROSEMIDE 40 MG/1
40 TABLET ORAL
Status: DISCONTINUED | OUTPATIENT
Start: 2025-04-13 | End: 2025-04-15

## 2025-04-13 RX ORDER — POTASSIUM CHLORIDE 1500 MG/1
40 TABLET, EXTENDED RELEASE ORAL EVERY 4 HOURS
Status: COMPLETED | OUTPATIENT
Start: 2025-04-13 | End: 2025-04-13

## 2025-04-13 NOTE — PROGRESS NOTES
Cardiology Progress Note    [facility]   part of [facility]      Jared Nicole Patient Status:  Inpatient    1945 MRN [id number]   Location [facility] 8NE-A Attending Trent Linn,    Hosp Day # 4 PCP Eric Neil MD     Impression:  79 year old male presenting with abdominal pain  Cholecystitis s/p biliary stent, now planning cholecystectomy as OP  Acute hypoxemic resp failure due to acute HFpEF    - Negative 2.3 L   - weight from 194 lb on admission to 185 lb this morning   - Currently on room air. O2 weaned off yesterday  Persistent AF, rate controlled  CHADS VASC 3 on coumadin - reversed this admit  Mod MR/TR  Mild pulm HTN  COPD  H/o smoking  HLD    Recommendations:  Change furosemide to 40 mg PO BID  Daily weights/ strict I&Os  Given acute decompensated HF, surgery deferred. Cholecystostomy placed. Plan for cholecystectomy in 6 weeks once more stable from CV stand point.   Continue Cardizem gtt  Continue metoprolol tartrate 75 mg BID  Restart coumadin when OK per surgery/ IR. If coumadin will be on hold for extended period, consider heparin IV bridge  Statin remains on hold for elevated LFTs  Maintain normal electrolyte levels    Subjective: Feeling a little better this morning. No chest pain.  No shortness of breath.  No new focal cardiovascular complaints reported.     Objective:     Medications:  Current Hospital Medications[1]    Past Medical History[2]    Past Surgical History[3]    Family History  family history includes Cancer in his mother; Lung Disorder in an other family member.    Social History   reports that he quit smoking about 43 years ago. His smoking use included cigarettes. He started smoking about 63 years ago. He has a 20 pack-year smoking history. He has never used smokeless tobacco. He reports current alcohol use. He reports that he does not use drugs.     Allergies  Allergies[4]    Review of Systems:  As per HPI, otherwise 10 point ROS is negative in  detail.    Physical Exam:  Blood pressure 114/61, pulse 95, temperature 98.3 °F (36.8 °C), temperature source Oral, resp. rate 18, height 5' 8\" (1.727 m), weight 185 lb 13.6 oz (84.3 kg), SpO2 94%.  Temp (24hrs), Av.1 °F (36.7 °C), Min:97.6 °F (36.4 °C), Max:98.5 °F (36.9 °C)    Wt Readings from Last 3 Encounters:   25 185 lb 13.6 oz (84.3 kg)   24 197 lb (89.4 kg)   24 197 lb (89.4 kg)       General: Awake and alert; in no acute distress  HEENT: Extraocular movements are intact; sclerae are anicteric; scalp is atraumatic  Neck: Supple; no JVD; no carotid bruits  Cardiac: Irreg ireg, variable S1, nl S2,  no murmurs, rubs, or gallops are appreciated  Lungs: Clear to auscultation bilaterally; no accessory muscle use is noted, no wheezes, rhonci or rales  Abdomen: Soft, non-distended, non-tender; bowel sounds are normoactive  Extremities: Warm, 1+ LE edema, clubbing or cyanosis; moves all 4 extremities normally, distal pulses intact and equal  Psychiatric: Normal mood and affect; answers questions appropriately  Dermatologic: No rashes; normal skin turgor    Diagnostic testing:    Labs:   Lab Results   Component Value Date    PT 26.4 (H) 2014    PT 24.7 (H) 2014    INR 1.24 (H) 2025    INR 1.56 (H) 04/10/2025        Lab Results   Component Value Date    CREATSERUM 1.14 2025    BUN 20 2025     2025    K 3.0 2025     2025    CO2 27.0 2025     2025    CA 8.8 2025    ALB 3.7 2025    ALKPHO 109 2025    BILT 2.8 2025    TP 6.4 2025    AST 29 2025    ALT 50 2025    PHOS 3.6 2025       Cardiac diagnostics:    EKG 4/10/2025: AF RBBB    Echo 4/10/2025:  1. Left ventricle: The cavity size was normal. Wall thickness was normal.      Systolic function was normal. The estimated ejection fraction was 55-60%,      by visual assessment. No diagnostic evidence for regional wall motion       abnormalities. Unable to assess LV diastolic function due to heart      rhythm.   2. Right ventricle: The cavity size was increased.   3. Left atrium: The left atrial volume was moderately increased.   4. Right atrium: The atrium was markedly dilated.   5. Mitral valve: There was moderate regurgitation.   6. Tricuspid valve: There was moderate regurgitation.   7. Pulmonary arteries: Systolic pressure was mildly increased, in the range      of 40mm Hg to 45mm Hg. Estimated pulmonary artery diastolic pressure was      6mm Hg.   8. Pericardium, extracardiac: There was a left pleural effusion.     CTA chest 4/10/2025  1. No acute pulmonary embolus.   2. Scattered bilateral ground-glass opacities which may represent pneumonia.   3. Mild interlobular septal thickening suggestive of edema.   4. Trace bilateral pleural effusions, right greater than left.   5. Mildly prominent mediastinal lymph nodes, likely reactive in nature.   6. There is mild gallbladder wall thickening.  Correlation with clinical symptoms to exclude acute cholecystitis.    7. Intrahepatic biliary air secondary to common bile duct stent partially visualized.         Thank you for allowing our practice to participate in the care of your patient. Please do not hesitate to contact me if you have any questions.    ANIVAL Gomez, 04/13/25, 7:38 AM           [1]   Current Facility-Administered Medications   Medication Dose Route Frequency    potassium chloride (Klor-Con M20) tab 40 mEq  40 mEq Oral Q4H    acetaminophen (Tylenol Extra Strength) tab 500 mg  500 mg Oral Q4H PRN    piperacillin-tazobactam (Zosyn) 3.375 g in dextrose 5% 100 mL IVPB-ADDV  3.375 g Intravenous Q8H    metoprolol tartrate (Lopressor) tab 75 mg  75 mg Oral 2x Daily(Beta Blocker)    metoprolol (Lopressor) 5 mg/5mL injection 5 mg  5 mg Intravenous Q6H PRN    dilTIAZem 10 mg BOLUS FROM BAG infusion  10 mg Intravenous Q1H PRN    dilTIAZem (cardIZEM) 100 mg in sodium chloride 0.9% 100 mL  IVPB-ADDV  2.5-20 mg/hr Intravenous Continuous    furosemide (Lasix) 10 mg/mL injection 40 mg  40 mg Intravenous BID (Diuretic)    HYDROcodone-acetaminophen (Norco) 5-325 MG per tab 1 tablet  1 tablet Oral Q6H PRN    pantoprazole (Protonix) 40 mg in sodium chloride 0.9% PF 10 mL IV push  40 mg Intravenous Q24H    dextromethorphan-guaiFENesin (Robitussin-DM)  mg/5mL oral solution 5 mL  5 mL Oral Q6H PRN    tamsulosin (Flomax) cap 0.4 mg  0.4 mg Oral Daily    melatonin tab 3 mg  3 mg Oral Nightly PRN    ondansetron (Zofran) 4 MG/2ML injection 4 mg  4 mg Intravenous Q6H PRN    metoclopramide (Reglan) 5 mg/mL injection 5 mg  5 mg Intravenous Q8H PRN    HYDROmorphone (Dilaudid) 1 MG/ML injection 0.2 mg  0.2 mg Intravenous Q2H PRN    Or    HYDROmorphone (Dilaudid) 1 MG/ML injection 0.4 mg  0.4 mg Intravenous Q2H PRN    Or    HYDROmorphone (Dilaudid) 1 MG/ML injection 0.8 mg  0.8 mg Intravenous Q2H PRN    polyethylene glycol (PEG 3350) (Miralax) 17 g oral packet 17 g  17 g Oral Daily PRN    sennosides (Senokot) tab 17.2 mg  17.2 mg Oral Nightly PRN    bisacodyl (Dulcolax) 10 MG rectal suppository 10 mg  10 mg Rectal Daily PRN    fleet enema (Fleet) rectal enema 133 mL  1 enema Rectal Once PRN   [2]   Past Medical History:   Anemia    Arrhythmia    atrial fibrillation    Atrial fibrillation (HCC)    Cancer (HCC)    SKIN    CHF (congestive heart failure) (HCC)    mild     Colitis    DIVERTICULITIS    Colonic diverticular abscess    Congestive heart disease (HCC)    Hx musculoskletl dis NEC    BACK/DISC ISSUES    Long term (current) use of anticoagulants    Mitral regurgitation    Mild to moderate     Mitral valve prolapse    Monitoring for long-term anticoagulant use    Other and unspecified hyperlipidemia    Perforated bowel (HCC)    Prediabetes    SOB (shortness of breath)    Unspecified essential hypertension    Visual impairment   [3]   Past Surgical History:  Procedure Laterality Date    Colectomy      Colon  surgery      Colonoscopy  4/2016    No polyps/inflammation noted, moderate to severe diverticulosis. Fecal transplant completed.    Colostomy      with reversal 2012    Other surgical history Left 2001    eye surgery, detached retina    Other surgical history N/A 4/12/2016    Procedure: COLONOSCOPY W/FECAL MICROBIOTA TRANSPLANT;  Surgeon: Dave Valle MD;  Location:  ENDOSCOPY   [4] No Known Allergies

## 2025-04-13 NOTE — PROGRESS NOTES
Name:Jared Nicole  MRN#:[id number]  :1945      Subjective:  Feeling better today.  Occasional abdominal pain but improved.     Objective:  RUQ drain site is CDI  Abdomen is soft and non tender.     Vital Signs:  Blood pressure 112/69, pulse 102, temperature 98.3 °F (36.8 °C), temperature source Oral, resp. rate 18, height 68\", weight 185 lb 13.6 oz, SpO2 95%.    Input/Output:    Intake/Output Summary (Last 24 hours) at 2025 1210  Last data filed at 2025 1203  Gross per 24 hour   Intake 1070.5 ml   Output 1535 ml   Net -464.5 ml     24 hour drain output: 170 ml    Labs:       Microbiology:    Gram Negative Wesly Abnormal    Enterococcus species Abnormal           Assessment/Plan:  78 yo with acute cholecystitis. S/p percutaneous cholecystostomy placed on 25. Abdominal pain improving. Enterococcus in bile. Unless cholecystectomy is planned before next 6 weeks, the gb drain needs to be left in place a minimum of 6 weeks. If drain is going to be left in long term then it should be exchanged q 3 months. Would recommend cholecystostogram before removal.     Gianni Vang MD  2025  12:10 PM

## 2025-04-13 NOTE — PLAN OF CARE
Patient alert and oriented x4. On room air. AFIB on tele, Cardizem gtt infusing and titrated per protocol . Vital signs stable.Continent of bowel and bladder. R cholecystotomy drain in place. Up with standby assist. No complaints of pain. Patient resting comfortably at this time. Plan of of care discussed with pt,all needs addressed and call light within reach .Fall precautions in place and maintained     POC: Cardizem gtt, iv abx, right cholecystomy in place needs to be irrigate every 8h, IV lasix       Problem: RISK FOR INFECTION - ADULT  Goal: Absence of fever/infection during anticipated neutropenic period  Description: INTERVENTIONS- Monitor WBC- Administer growth factors as ordered- Implement neutropenic guidelines  Outcome: Progressing     Problem: SAFETY ADULT - FALL  Goal: Free from fall injury  Description: INTERVENTIONS:- Assess pt frequently for physical needs- Identify cognitive and physical deficits and behaviors that affect risk of falls.- [place] fall precautions as indicated by assessment.- Educate pt/family on patient safety including physical limitations- Instruct pt to call for assistance with activity based on assessment- Modify environment to reduce risk of injury- Provide assistive devices as appropriate- Consider OT/PT consult to assist with strengthening/mobility- Encourage toileting schedule  Outcome: Progressing     Problem: PAIN - ADULT  Goal: Verbalizes/displays adequate comfort level or patient's stated pain goal  Description: INTERVENTIONS:- Encourage pt to monitor pain and request assistance- Assess pain using appropriate pain scale- Administer analgesics based on type and severity of pain and evaluate response- Implement non-pharmacological measures as appropriate and evaluate response- Consider cultural and social influences on pain and pain management- Manage/alleviate anxiety- Utilize distraction and/or relaxation techniques- Monitor for opioid side effects- Notify MD/LIP if  interventions unsuccessful or patient reports new pain- Anticipate increased pain with activity and pre-medicate as appropriate  Outcome: Progressing     Problem: DISCHARGE PLANNING  Goal: Discharge to home or other facility with appropriate resources  Description: INTERVENTIONS:- Identify barriers to discharge w/pt and caregiver- Include patient/family/discharge partner in discharge planning- Arrange for needed discharge resources and transportation as appropriate- Identify discharge learning needs (meds, wound care, etc)- Arrange for interpreters to assist at discharge as needed- Consider post-discharge preferences of patient/family/discharge partner- Complete POLST form as appropriate- Assess patient's ability to be responsible for managing their own health- Refer to Case Management Department for coordinating discharge planning if the patient needs post-hospital services based on physician/LIP order or complex needs related to functional status, cognitive ability or social support system  Outcome: Progressing

## 2025-04-13 NOTE — PLAN OF CARE
Shift Note:  Patient alert and oriented x4. Very pleasant, no new complaints today, occasional muscle spasms at the drain site.   Patient on room air, denies difficulty breathing, lung sounds clear. Strong cough with some secretions.   Denies any cardiac symptoms, controlled Afib with HR 80-90s while on IV Cardizem on tele. Denies pain at this time.   Continent of bowel and bladder, last BM 4/11.   Ambulates with standby assist, call light within reach, tolerating care well.     POC:  - IV Cardizem   - IV Lasix BID // Daily weight   - IV ABX for (+) cultures         Problem: PAIN - ADULT  Goal: Verbalizes/displays adequate comfort level or patient's stated pain goal  Description: INTERVENTIONS:- Encourage pt to monitor pain and request assistance- Assess pain using appropriate pain scale- Administer analgesics based on type and severity of pain and evaluate response- Implement non-pharmacological measures as appropriate and evaluate response- Consider cultural and social influences on pain and pain management- Manage/alleviate anxiety- Utilize distraction and/or relaxation techniques- Monitor for opioid side effects- Notify MD/LIP if interventions unsuccessful or patient reports new pain- Anticipate increased pain with activity and pre-medicate as appropriate  Outcome: Progressing     Problem: RISK FOR INFECTION - ADULT  Goal: Absence of fever/infection during anticipated neutropenic period  Description: INTERVENTIONS- Monitor WBC- Administer growth factors as ordered- Implement neutropenic guidelines  Outcome: Progressing     Problem: SAFETY ADULT - FALL  Goal: Free from fall injury  Description: INTERVENTIONS:- Assess pt frequently for physical needs- Identify cognitive and physical deficits and behaviors that affect risk of falls.- [place] fall precautions as indicated by assessment.- Educate pt/family on patient safety including physical limitations- Instruct pt to call for assistance with activity based on  assessment- Modify environment to reduce risk of injury- Provide assistive devices as appropriate- Consider OT/PT consult to assist with strengthening/mobility- Encourage toileting schedule  Outcome: Progressing     Problem: DISCHARGE PLANNING  Goal: Discharge to home or other facility with appropriate resources  Description: INTERVENTIONS:- Identify barriers to discharge w/pt and caregiver- Include patient/family/discharge partner in discharge planning- Arrange for needed discharge resources and transportation as appropriate- Identify discharge learning needs (meds, wound care, etc)- Arrange for interpreters to assist at discharge as needed- Consider post-discharge preferences of patient/family/discharge partner- Complete POLST form as appropriate- Assess patient's ability to be responsible for managing their own health- Refer to Case Management Department for coordinating discharge planning if the patient needs post-hospital services based on physician/LIP order or complex needs related to functional status, cognitive ability or social support system  Outcome: Progressing     Problem: Patient/Family Goals  Goal: Patient/Family Long Term Goal  Description: Patient's Long Term Goal: Discharge home   Interventions:  - Pain management   -IVF   - IV abx   - See additional Care Plan goals for specific interventions  Outcome: Progressing  Goal: Patient/Family Short Term Goal  Description: Patient's Short Term Goal: Pain management   Interventions: - PRN pain medications   - See additional Care Plan goals for specific interventions  Outcome: Progressing

## 2025-04-13 NOTE — PROGRESS NOTES
[facility]  Progress Note    Jared Nicole Patient Status:  Inpatient    1945 MRN [id number]   [facility] 8NE-A Attending Trent Linn,    Hosp Day # 5 PCP Eric Neil MD     Subjective:  Patient resting comfortably in hospital at time encounter.  He states he is feeling improved since cholecystostomy tube was placed.  He states pain is improving, still endorses intermittent discomfort in the right upper quadrant.  He has been tolerating liquid diet, no nausea or vomiting.    Objective/Physical Exam:  General: Alert, orientated x3.  Cooperative.  No apparent distress.  Vital Signs:  Blood pressure 112/69, pulse 102, temperature 97.9 °F (36.6 °C), temperature source Oral, resp. rate 18, height 68\", weight 185 lb 13.6 oz (84.3 kg), SpO2 95%.  Wt Readings from Last 3 Encounters:   25 185 lb 13.6 oz (84.3 kg)   24 197 lb (89.4 kg)   24 197 lb (89.4 kg)     Lungs: No respiratory distress.  Cardiac: Regular rate and rhythm.   Abdomen:  Soft, non-distended, mild tenderness to RUQ, with no rebound or guarding.  No peritoneal signs.   Extremities:  No lower extremity edema noted.    Incision: Clean, dry, intact, no erythema  Drain: Cholecystostomy tube in place, draining clear bile-tinged fluid.    Intake/Output:    Intake/Output Summary (Last 24 hours) at 2025 1513  Last data filed at 2025 1500  Gross per 24 hour   Intake 1124.83 ml   Output 1235 ml   Net -110.17 ml     I/O last 3 completed shifts:  In: 1934.2 [P.O.:1280; I.V.:234.2; IV PIGGYBACK:400]  Out: 2425 [Urine:2225; Drains:200]  I/O this shift:  In: 629 [P.O.:420; I.V.:109; IV PIGGYBACK:100]  Out: 415 [Urine:400; Drains:15]    Medications: Scheduled Medications[1]    Labs:     Lab Results   Component Value Date     2025    K 3.0 2025     2025    CO2 27.0 2025    BUN 20 2025    CREATSERUM 1.14 2025     2025    CA 8.8 2025    ALKPHO 109  04/13/2025    ALT 50 04/13/2025    AST 29 04/13/2025    BILT 2.8 04/13/2025    ALB 3.7 04/13/2025    TP 6.4 04/13/2025     Lab Results   Component Value Date    PT 26.4 (H) 04/04/2014    PT 24.7 (H) 04/03/2014    PT 18.0 (H) 06/29/2013    INR 1.24 (H) 04/11/2025    INR 1.56 (H) 04/10/2025    INR 1.81 (H) 04/09/2025         Assessment  Problem List[2]    Biliary colic  Status post cholecystostomy tube placement on 4/11/2025  Status post ERCP with biliary stent placement on 4/9/2025    Plan:  Start general diet, as patient tolerates  Continue IV antibiotics  Continue analgesics and antiemetics as needed  Continue drain care  Ambulate and up to chair  DVT prophylaxis with SCDs, ambulation  GI prophylaxis with Protonix    Quality:  DVT Mechanical Prophylaxis:   SCDs,    DVT Pharmacologic Prophylaxis   Medication   None                Code Status: Prior  Echols: No urinary catheter in place  Echols Duration (in days):   Central line:    DAT: 4/14/2025        Winsome Wells PA-C  4/13/2025  3:13 PM      Addendum:    The patient was independently examined. I agree with the PA's assessment and plan.     Patient feels good following cholecystostomy tube placement.  Tolerating diet.  Will advance as tolerated.  Continue current management per primary team.  Outpatient follow-up and cholangiogram via cholecystostomy tube in approximately 6 weeks.  If cystic duct becomes patent then tube can be clamped and removed if tolerated.  Otherwise consideration for prolonged maintenance of cholecystostomy tube versus cholecystectomy based on patient's overall clinical status.    General surgery will follow the patient peripherally for the remainder of the hospitalization.  Please reconsult should the need arise.    More than 50% of clinical time and 100% MDM was performed by me.     I, Dr. Janes Dias, personally performed the services described in this documentation, as documented  by MAGGIE Pak,  and they are both accurate and  complete.      Janes Dias MD FACS  Trauma Medical Director, [facility] General Surgery    The 21st Century Cures Act makes medical notes like these available to patients in the interest of transparency. Please be advised this is a medical document. Medical documents are intended to carry relevant information, facts as evident, and the clinical opinion of the practitioner. The medical note is intended as peer to peer communication and may appear blunt or direct. It is written in medical language and may contain abbreviations or verbiage that are unfamiliar.    This note was prepared using Dragon Medical voice recognition dictation software. As a result, errors may occur. When identified, these errors have been corrected. While every attempt is made to correct errors during dictation, discrepancies may still exist.           [1]    furosemide  40 mg Oral BID (Diuretic)    piperacillin-tazobactam  3.375 g Intravenous Q8H    metoprolol tartrate  75 mg Oral 2x Daily(Beta Blocker)    pantoprazole  40 mg Intravenous Q24H    tamsulosin  0.4 mg Oral Daily   [2]   Patient Active Problem List  Diagnosis    Chronic systolic congestive heart failure (HCC)    Mixed hyperlipidemia    Mitral regurgitation    Prediabetes    COPD (chronic obstructive pulmonary disease) (HCC)    Monitoring for long-term anticoagulant use    Permanent atrial fibrillation (HCC)    Aortic atherosclerosis    Aortic valve sclerosis    Benign prostatic hyperplasia with urinary hesitancy    Primary hypertension    Common bile duct dilatation    Cholangitis (HCC)    Choledocholithiasis    Calculus of gallbladder with acute cholecystitis and obstruction    Anticoagulated on Coumadin    Paroxysmal atrial fibrillation (HCC)    Acute diastolic heart failure (HCC)

## 2025-04-13 NOTE — PROGRESS NOTES
[facility]   part of [facility]     Hospitalist Progress Note     aJred Nicole Patient Status:  Inpatient    1945 MRN [id number]   Location [facility] 3NW-A Attending Trent Linn DO   Hosp Day # 5 PCP Eric Neil MD     Chief Complaint: Abdominal pain    Subjective:     Patient seen and examined. Mild abdominal soreness at drain. Denies CP/SOB.     Objective:    Review of Systems:   A comprehensive review of systems was completed; pertinent positive and negatives stated in subjective.    Vital signs:  Temp:  [97.8 °F (36.6 °C)-98.5 °F (36.9 °C)] 97.9 °F (36.6 °C)  Pulse:  [] 102  Resp:  [16-24] 18  BP: ()/(54-84) 112/69  SpO2:  [89 %-95 %] 95 %    Physical Exam:    General: No acute distress  Respiratory: No wheezes, no rhonchi  Cardiovascular: Irregularly irregular.   Abdomen: Soft, mild RUQ tenderness, non-distended, positive bowel sounds  Neuro: No new focal deficits.   Extremities: No edema    Diagnostic Data:    Labs:  Recent Labs   Lab 25  1121 25  1804 25  0540 25  0839 25  1356 04/10/25  0523 04/10/25  0809 25  0602 25  1025   WBC 11.3*  --  15.6*  --   --   --  14.0* 11.5*  --    HGB 15.4  --  14.0  --   --   --  13.9 13.4  --    MCV 91.0  --  91.6  --   --   --  91.5 90.9  --    .0  --  170.0  --   --   --  151.0 150.0  --    INR 3.12* 3.32*  --  4.08* 1.81* 1.56*  --   --  1.24*       Recent Labs   Lab 25  0602 25  0903 25  0428   *  --  120* 106*   BUN 18  --  20 20   CREATSERUM 0.96  --  1.25 1.14   CA 9.1  --  9.4 8.8   ALB 3.9  --  4.2 3.7     --  141 139   K 3.5  3.5 4.2 3.8 3.0*     --  103 102   CO2 26.0  --  28.0 27.0   ALKPHO 130*  --  127* 109   AST 55*  --  42* 29   ALT 91*  --  68* 50*   BILT 3.6*  --  3.2* 2.8*   TP 6.6  --  7.0 6.4       Estimated Glomerular Filtration Rate: 65 mL/min/1.73m2 (result from lab).    Recent Labs   Lab 25  1121    TROPHS <3       Recent Labs   Lab 04/09/25  1356 04/10/25  0523 04/11/25  1025   PTP 21.1* 18.8* 15.7*   INR 1.81* 1.56* 1.24*              Microbiology    Hospital Encounter on 04/08/25   1. Body Fluid Cult Aerobic and Anaerobic     Status: Abnormal (Preliminary result)    Collection Time: 04/11/25  7:25 PM    Specimen: Abdominal fluid; Body fluid, unspecified   Result Value Ref Range    Body Fluid Culture Result Gram Negative Wesly (A) N/A    Body Fluid Culture Result Enterococcus species (A) N/A    Body Fluid Smear Gram stain of positive bottle shows: (A) N/A    Body Fluid Smear Gram Positive Cocci (A) N/A    Body Fluid Smear Gram Negative Rods (A) N/A         Imaging: Reviewed in Epic.    Medications: Scheduled Medications[1]    Assessment & Plan:      #Biliary colic  #Dilated CBD with filling defect   #Elevated LFTs  -S/p ERCP 4/9 with biliary stent placement   -Monitor LFTs - improving   -General surgery following - s/p cholecystostomy 4/11  -Culture from cholecystostomy growing enterococcus so far     #Acute diastolic heart failure  #Acute pulmonary edema  #Mod MR/TR  #Pulmonary HTN  -Echo reviewed  -Diuresis per cardiology     #Chronic afib with RVR  -Cardizem gtt   -Coumadin reversed with vitamin K and FFP  -Resume coumadin when ok with surgery/IR    #BPH  -Flomax     #CKDIII  -At baseline     #DL  -Statin on hold due to elevated LFTs    Trent Linn, DO    Supplementary Documentation:     Quality:  DVT Mechanical Prophylaxis:   SCDs,    DVT Pharmacologic Prophylaxis   Medication   None                Code Status: Prior  Echols: No urinary catheter in place  Echols Duration (in days):   Central line:    DAT: 4/14/2025    Discharge is dependent on: clinical progress  At this point Mr. Nicole is expected to be discharge to: home    The 21st Century Cures Act makes medical notes like these available to patients in the interest of transparency. Please be advised this is a medical document. Medical documents are  intended to carry relevant information, facts as evident, and the clinical opinion of the practitioner. The medical note is intended as peer to peer communication and may appear blunt or direct. It is written in medical language and may contain abbreviations or verbiage that are unfamiliar.                         [1]    furosemide  40 mg Oral BID (Diuretic)    piperacillin-tazobactam  3.375 g Intravenous Q8H    metoprolol tartrate  75 mg Oral 2x Daily(Beta Blocker)    pantoprazole  40 mg Intravenous Q24H    tamsulosin  0.4 mg Oral Daily

## 2025-04-14 LAB
ALBUMIN SERPL-MCNC: 4.2 G/DL (ref 3.2–4.8)
ALBUMIN/GLOB SERPL: 1.4 {RATIO} (ref 1–2)
ALP LIVER SERPL-CCNC: 113 U/L (ref 45–117)
ALT SERPL-CCNC: 45 U/L (ref 10–49)
ANION GAP SERPL CALC-SCNC: 12 MMOL/L (ref 0–18)
AST SERPL-CCNC: 29 U/L (ref ?–34)
BILIRUB SERPL-MCNC: 2.2 MG/DL (ref 0.2–1.1)
BUN BLD-MCNC: 21 MG/DL (ref 9–23)
CALCIUM BLD-MCNC: 9.3 MG/DL (ref 8.7–10.6)
CHLORIDE SERPL-SCNC: 103 MMOL/L (ref 98–112)
CO2 SERPL-SCNC: 24 MMOL/L (ref 21–32)
CREAT BLD-MCNC: 1.23 MG/DL (ref 0.7–1.3)
EGFRCR SERPLBLD CKD-EPI 2021: 59 ML/MIN/1.73M2 (ref 60–?)
GLOBULIN PLAS-MCNC: 2.9 G/DL (ref 2–3.5)
GLUCOSE BLD-MCNC: 123 MG/DL (ref 70–99)
OSMOLALITY SERPL CALC.SUM OF ELEC: 292 MOSM/KG (ref 275–295)
POTASSIUM SERPL-SCNC: 3.6 MMOL/L (ref 3.5–5.1)
POTASSIUM SERPL-SCNC: 4.1 MMOL/L (ref 3.5–5.1)
PROT SERPL-MCNC: 7.1 G/DL (ref 5.7–8.2)
SODIUM SERPL-SCNC: 139 MMOL/L (ref 136–145)

## 2025-04-14 PROCEDURE — 99232 SBSQ HOSP IP/OBS MODERATE 35: CPT | Performed by: HOSPITALIST

## 2025-04-14 RX ORDER — PANTOPRAZOLE SODIUM 40 MG/1
40 TABLET, DELAYED RELEASE ORAL
Status: DISCONTINUED | OUTPATIENT
Start: 2025-04-15 | End: 2025-04-15

## 2025-04-14 RX ORDER — POTASSIUM CHLORIDE 1500 MG/1
40 TABLET, EXTENDED RELEASE ORAL EVERY 4 HOURS
Status: COMPLETED | OUTPATIENT
Start: 2025-04-14 | End: 2025-04-14

## 2025-04-14 RX ORDER — DILTIAZEM HYDROCHLORIDE 120 MG/1
120 CAPSULE, EXTENDED RELEASE ORAL DAILY
Status: DISCONTINUED | OUTPATIENT
Start: 2025-04-14 | End: 2025-04-15

## 2025-04-14 RX ORDER — WARFARIN SODIUM 5 MG/1
7.5 TABLET ORAL
Status: COMPLETED | OUTPATIENT
Start: 2025-04-14 | End: 2025-04-14

## 2025-04-14 NOTE — PROGRESS NOTES
[facility]   part of [facility]     Hospitalist Progress Note     Jared Nicole Patient Status:  Inpatient    1945 MRN [id number]   Location [facility] 3NW-A Attending Trent Linn,    Hosp Day # 6 PCP Eric Neil MD     Chief Complaint: Abdominal pain    Subjective:     Patient seen and examined. Denies abdominal pain/N/V. Denies CP/SOB.     Objective:    Review of Systems:   A comprehensive review of systems was completed; pertinent positive and negatives stated in subjective.    Vital signs:  Temp:  [97.5 °F (36.4 °C)-98.6 °F (37 °C)] 98.6 °F (37 °C)  Pulse:  [] 91  Resp:  [16-20] 16  BP: ()/(50-96) 121/96  SpO2:  [92 %-94 %] 94 %    Physical Exam:    General: No acute distress  Respiratory: No wheezes, no rhonchi  Cardiovascular: Irregularly irregular.   Abdomen: Soft, mild RUQ tenderness, non-distended, positive bowel sounds  Neuro: No new focal deficits.   Extremities: No edema    Diagnostic Data:    Labs:  Recent Labs   Lab 25  1121 25  1804 25  0540 25  0839 25  1356 04/10/25  0523 04/10/25  0809 25  0602 25  1025   WBC 11.3*  --  15.6*  --   --   --  14.0* 11.5*  --    HGB 15.4  --  14.0  --   --   --  13.9 13.4  --    MCV 91.0  --  91.6  --   --   --  91.5 90.9  --    .0  --  170.0  --   --   --  151.0 150.0  --    INR 3.12* 3.32*  --  4.08* 1.81* 1.56*  --   --  1.24*       Recent Labs   Lab 25  0903 25  0428 25  1524 25  0519   * 106*  --  123*   BUN 20 20  --  21   CREATSERUM 1.25 1.14  --  1.23   CA 9.4 8.8  --  9.3   ALB 4.2 3.7  --  4.2    139  --  139   K 3.8 3.0* 3.6 3.6    102  --  103   CO2 28.0 27.0  --  24.0   ALKPHO 127* 109  --  113   AST 42* 29  --  29   ALT 68* 50*  --  45   BILT 3.2* 2.8*  --  2.2*   TP 7.0 6.4  --  7.1       Estimated Glomerular Filtration Rate: 59 mL/min/1.73m2 (A) (result from lab).    Recent Labs   Lab 25  1121   TROPHS <3        Recent Labs   Lab 04/09/25  1356 04/10/25  0523 04/11/25  1025   PTP 21.1* 18.8* 15.7*   INR 1.81* 1.56* 1.24*              Microbiology    Hospital Encounter on 04/08/25   1. Body Fluid Cult Aerobic and Anaerobic     Status: Abnormal (Preliminary result)    Collection Time: 04/11/25  7:25 PM    Specimen: Abdominal fluid; Body fluid, unspecified   Result Value Ref Range    Body Fluid Culture Result Escherichia coli (A) N/A    Body Fluid Culture Result Enterococcus faecalis (A) N/A    Body Fluid Smear Gram stain of positive bottle shows: (A) N/A    Body Fluid Smear Gram Positive Cocci (A) N/A    Body Fluid Smear Gram Negative Rods (A) N/A       Susceptibility    Escherichia coli -  (no method available)     Ampicillin 16 Intermediate      Cefazolin <=4 Sensitive      Ciprofloxacin >=4 Resistant      Gentamicin <=1 Sensitive      Meropenem <=0.25 Sensitive      Levofloxacin >=8 Resistant      Piperacillin + Tazobactam <=4 Sensitive      Trimethoprim/Sulfa <=20 Sensitive     Enterococcus faecalis -  (no method available)     Ampicillin <=2 Sensitive      Vancomycin 2 Sensitive          Imaging: Reviewed in Epic.    Medications: Scheduled Medications[1]    Assessment & Plan:      #Biliary colic  #Dilated CBD with filling defect   #Elevated LFTs  -S/p ERCP 4/9 with biliary stent placement   -Monitor LFTs - improving   -General surgery following - s/p cholecystostomy 4/11  -Culture from cholecystostomy growing enterococcus so far     #Acute diastolic heart failure  #Acute pulmonary edema  #Mod MR/TR  #Pulmonary HTN  -Echo reviewed  -Diuresis per cardiology     #Chronic afib with RVR  -Cardizem gtt being weaned, PO cardizem started  -Coumadin reversed with vitamin K and FFP  -Resume coumadin when ok with surgery/IR    #BPH  -Flomax     #CKDIII  -At baseline     #DL  -Statin on hold due to elevated LFTs    Trent Linn,     Supplementary Documentation:     Quality:  DVT Mechanical Prophylaxis:   SCDs,    DVT  Pharmacologic Prophylaxis   Medication   None                Code Status: Prior  Echols: No urinary catheter in place  Echols Duration (in days):   Central line:    DAT:     Discharge is dependent on: clinical progress  At this point Mr. Nicole is expected to be discharge to: home    The 21st Century Cures Act makes medical notes like these available to patients in the interest of transparency. Please be advised this is a medical document. Medical documents are intended to carry relevant information, facts as evident, and the clinical opinion of the practitioner. The medical note is intended as peer to peer communication and may appear blunt or direct. It is written in medical language and may contain abbreviations or verbiage that are unfamiliar.                         [1]    [START ON 4/15/2025] pantoprazole  40 mg Oral QAM AC    dilTIAZem ER  120 mg Oral Daily    furosemide  40 mg Oral BID (Diuretic)    piperacillin-tazobactam  3.375 g Intravenous Q8H    metoprolol tartrate  75 mg Oral 2x Daily(Beta Blocker)    tamsulosin  0.4 mg Oral Daily

## 2025-04-14 NOTE — SPIRITUAL CARE NOTE
Spiritual Care Visit Note    Patient Name: Jared Nicole Date of Spiritual Care Visit: 25   : 1945 Primary Dx: Common bile duct dilatation       Referred By:    Spiritual Care Taxonomy:  Intended Effects: Promote a sense of peace  Methods: Offer spiritual/Amish support, Offer emotional support  Interventions: Active listening, Ask guided questions, Share words of hope and inspiration  Visit Type/Summary:   - Spiritual Care: Consulted with RN prior to visit. Offered empathic listening and emotional support.    Spiritual Care support can be requested via an Epic consult. For urgent/immediate needs, please contact the On Call  at: Edward: ext [phone]      Rev Sallie Martinez

## 2025-04-14 NOTE — PROGRESS NOTES
[facility] Pharmacy Dosing Service  Warfarin (Coumadin) Initial Dosing    Jared Nicole is a 79 year old patient for whom pharmacy has been consulted to dose warfarin (COUMADIN) for  afib  by Dr. Linn.  Based on this indication, goal INR is 2-3.    Pertinent Patient Medical History:  Afib  Potential Drug Interactions:  no significant interactions identified     Latest INR: No results for input(s): \"INR\" in the last 72 hours.    Home regimen (if applicable):    Sunday 5 mg   Monday 5 mg   Tuesday 5 mg   Wednesday 5 mg   Thursday 2.5 mg   Friday 5 mg   Saturday 2.5 mg   Weekly Dose 30 mg    Date/Time last dose was given (if applicable): NA     Based on above -  1.  For today, Give warfarin (COUMADIN) 7.5 mg at 2100 tonight    2.  PT/INR ordered daily while on warfarin    3.  Pharmacy will continue to follow.  We appreciate the opportunity to assist in the care of this patient.    Jaja Steve, PharmD  4/14/2025  3:08 PM

## 2025-04-14 NOTE — PROGRESS NOTES
Cardiology Progress Note    [facility]   part of [facility]      Jared Nicole Patient Status:  Inpatient    1945 MRN [id number]   Location [facility] 8NE-A Attending Trent Linn,    Hosp Day # 4 PCP Eric Neil MD     Impression:  79 year old male presenting with abdominal pain  Cholecystitis s/p biliary stent, now planning cholecystectomy as OP  Acute hypoxemic resp failure due to acute HFpEF    - Negative 2.5 L   - weight from 194 lb on admission to 186 lb this morning   - Currently on room air. O2 weaned off   Persistent AF, rate controlled  On dilt drip  CHADS VASC 3 on coumadin - reversed this admit  Mod MR/TR  Mild pulm HTN  COPD  H/o smoking  HLD    Recommendations:  S/p IV lasix, now 40 mg PO BID  Start diltiazem 120mg daily this morning and wean off drip   Continue metoprolol tartrate 75 mg BID  Daily weights/ strict I&Os  Given acute decompensated HF, surgery deferred. Cholecystostomy placed. Plan for cholecystectomy in as outpatient once more stable from CV stand point.   Restart coumadin when OK per surgery/ IR. If coumadin will be on hold for extended period, consider heparin IV bridge  Statin on hold for elevated LFTs, resume when able  Maintain normal electrolyte levels    Subjective: No CP or SOB. + cough. No sig abd pain.     Medications:  Current Hospital Medications[1]    Past Medical History[2]    Past Surgical History[3]    Family History  family history includes Cancer in his mother; Lung Disorder in an other family member.    Social History   reports that he quit smoking about 43 years ago. His smoking use included cigarettes. He started smoking about 63 years ago. He has a 20 pack-year smoking history. He has never used smokeless tobacco. He reports current alcohol use. He reports that he does not use drugs.     Allergies  Allergies[4]    Review of Systems:  As per HPI, otherwise 10 point ROS is negative in detail.    Physical Exam:  Blood pressure 103/76,  pulse 84, temperature 97.5 °F (36.4 °C), temperature source Oral, resp. rate 18, height 68\", weight 186 lb 4.6 oz (84.5 kg), SpO2 94%.  Temp (24hrs), Av °F (36.7 °C), Min:97.5 °F (36.4 °C), Max:98.2 °F (36.8 °C)    Wt Readings from Last 3 Encounters:   25 186 lb 4.6 oz (84.5 kg)   24 197 lb (89.4 kg)   24 197 lb (89.4 kg)       General: Awake and alert; in no acute distress  HEENT: Extraocular movements are intact; sclerae are anicteric; scalp is atraumatic  Neck: Supple; no JVD; no carotid bruits  Cardiac: Irreg ireg, variable S1, nl S2,  no murmurs, rubs, or gallops are appreciated  Lungs: Clear to auscultation bilaterally; no accessory muscle use is noted, no wheezes, rhonci or rales  Abdomen: Soft, non-distended, non-tender; bowel sounds are normoactive  Extremities: Warm, trace LE edema, clubbing or cyanosis; moves all 4 extremities normally, distal pulses intact and equal  Psychiatric: Normal mood and affect; answers questions appropriately  Dermatologic: No rashes; normal skin turgor    Diagnostic testing:    Labs:   Lab Results   Component Value Date    PT 26.4 (H) 2014    PT 24.7 (H) 2014    INR 1.24 (H) 2025    INR 1.56 (H) 04/10/2025        Lab Results   Component Value Date    CREATSERUM 1.23 2025    BUN 21 2025     2025    K 3.6 2025     2025    CO2 24.0 2025     2025    CA 9.3 2025    ALB 4.2 2025    ALKPHO 113 2025    BILT 2.2 2025    TP 7.1 2025    AST 29 2025    ALT 45 2025       Cardiac diagnostics:    EKG 4/10/2025: AF RBBB    Echo 4/10/2025:  1. Left ventricle: The cavity size was normal. Wall thickness was normal.      Systolic function was normal. The estimated ejection fraction was 55-60%,      by visual assessment. No diagnostic evidence for regional wall motion      abnormalities. Unable to assess LV diastolic function due to heart      rhythm.   2.  Right ventricle: The cavity size was increased.   3. Left atrium: The left atrial volume was moderately increased.   4. Right atrium: The atrium was markedly dilated.   5. Mitral valve: There was moderate regurgitation.   6. Tricuspid valve: There was moderate regurgitation.   7. Pulmonary arteries: Systolic pressure was mildly increased, in the range      of 40mm Hg to 45mm Hg. Estimated pulmonary artery diastolic pressure was      6mm Hg.   8. Pericardium, extracardiac: There was a left pleural effusion.     CTA chest 4/10/2025  1. No acute pulmonary embolus.   2. Scattered bilateral ground-glass opacities which may represent pneumonia.   3. Mild interlobular septal thickening suggestive of edema.   4. Trace bilateral pleural effusions, right greater than left.   5. Mildly prominent mediastinal lymph nodes, likely reactive in nature.   6. There is mild gallbladder wall thickening.  Correlation with clinical symptoms to exclude acute cholecystitis.    7. Intrahepatic biliary air secondary to common bile duct stent partially visualized.         Thank you for allowing our practice to participate in the care of your patient. Please do not hesitate to contact me if you have any questions.             [1]   Current Facility-Administered Medications   Medication Dose Route Frequency    potassium chloride (Klor-Con M20) tab 40 mEq  40 mEq Oral Q4H    [START ON 4/15/2025] pantoprazole (Protonix) DR tab 40 mg  40 mg Oral QAM AC    dilTIAZem ER (Dilacor XR) 24 hr cap 120 mg  120 mg Oral Daily    furosemide (Lasix) tab 40 mg  40 mg Oral BID (Diuretic)    acetaminophen (Tylenol Extra Strength) tab 500 mg  500 mg Oral Q4H PRN    piperacillin-tazobactam (Zosyn) 3.375 g in dextrose 5% 100 mL IVPB-ADDV  3.375 g Intravenous Q8H    metoprolol tartrate (Lopressor) tab 75 mg  75 mg Oral 2x Daily(Beta Blocker)    metoprolol (Lopressor) 5 mg/5mL injection 5 mg  5 mg Intravenous Q6H PRN    dilTIAZem 10 mg BOLUS FROM BAG infusion  10 mg  Intravenous Q1H PRN    dilTIAZem (cardIZEM) 100 mg in sodium chloride 0.9% 100 mL IVPB-ADDV  2.5-20 mg/hr Intravenous Continuous    HYDROcodone-acetaminophen (Norco) 5-325 MG per tab 1 tablet  1 tablet Oral Q6H PRN    dextromethorphan-guaiFENesin (Robitussin-DM)  mg/5mL oral solution 5 mL  5 mL Oral Q6H PRN    tamsulosin (Flomax) cap 0.4 mg  0.4 mg Oral Daily    melatonin tab 3 mg  3 mg Oral Nightly PRN    ondansetron (Zofran) 4 MG/2ML injection 4 mg  4 mg Intravenous Q6H PRN    metoclopramide (Reglan) 5 mg/mL injection 5 mg  5 mg Intravenous Q8H PRN    HYDROmorphone (Dilaudid) 1 MG/ML injection 0.2 mg  0.2 mg Intravenous Q2H PRN    Or    HYDROmorphone (Dilaudid) 1 MG/ML injection 0.4 mg  0.4 mg Intravenous Q2H PRN    Or    HYDROmorphone (Dilaudid) 1 MG/ML injection 0.8 mg  0.8 mg Intravenous Q2H PRN    polyethylene glycol (PEG 3350) (Miralax) 17 g oral packet 17 g  17 g Oral Daily PRN    sennosides (Senokot) tab 17.2 mg  17.2 mg Oral Nightly PRN    bisacodyl (Dulcolax) 10 MG rectal suppository 10 mg  10 mg Rectal Daily PRN    fleet enema (Fleet) rectal enema 133 mL  1 enema Rectal Once PRN   [2]   Past Medical History:   Anemia    Arrhythmia    atrial fibrillation    Atrial fibrillation (HCC)    Cancer (HCC)    SKIN    CHF (congestive heart failure) (HCC)    mild     Colitis    DIVERTICULITIS    Colonic diverticular abscess    Congestive heart disease (HCC)    Hx musculoskletl dis NEC    BACK/DISC ISSUES    Long term (current) use of anticoagulants    Mitral regurgitation    Mild to moderate     Mitral valve prolapse    Monitoring for long-term anticoagulant use    Other and unspecified hyperlipidemia    Perforated bowel (HCC)    Prediabetes    SOB (shortness of breath)    Unspecified essential hypertension    Visual impairment   [3]   Past Surgical History:  Procedure Laterality Date    Colectomy      Colon surgery      Colonoscopy  4/2016    No polyps/inflammation noted, moderate to severe diverticulosis.  Fecal transplant completed.    Colostomy      with reversal 2012    Other surgical history Left 2001    eye surgery, detached retina    Other surgical history N/A 4/12/2016    Procedure: COLONOSCOPY W/FECAL MICROBIOTA TRANSPLANT;  Surgeon: Dave Valle MD;  Location:  ENDOSCOPY   [4] No Known Allergies

## 2025-04-14 NOTE — IMAGING NOTE
Perc heath tube 24hr output 160ml.  Recommend flushing with 10 ml saline once daily to maintain tube patency. Continue gravity drainage.  Tube must remain in place for at least 4-6 weeks to allow tract maturity. After that time tube injection study can be performed if removal is desired.  Routine outpatient tube exchange is recommended every 8-12 weeks, if tube is still in place.

## 2025-04-14 NOTE — PLAN OF CARE
Patient is admitted for abd pain. Pt is AOX4.  VSS, afebrile and denies any pain. SpO2 maintained on RA. . Denies any SOB. Congested productive cough. Gave PRN cough med. IS.  Tele-Afib. Cardizem gtt off. PO Cardizem. Coumadin is resumed. Reg diet/ low fiber soft, pt is tolerating diet well.  Denies any n/v/d. RUQ cholecystostomy is draining bile color drainage. Daily NS flush. Voids. Up with SBA/walker. IV ABX. PO Lasix. DW, Strict I.O.  Will continue to monitor. Pt is updated with plan of care.        Problem: PAIN - ADULT  Goal: Verbalizes/displays adequate comfort level or patient's stated pain goal  Description: INTERVENTIONS:- Encourage pt to monitor pain and request assistance- Assess pain using appropriate pain scale- Administer analgesics based on type and severity of pain and evaluate response- Implement non-pharmacological measures as appropriate and evaluate response- Consider cultural and social influences on pain and pain management- Manage/alleviate anxiety- Utilize distraction and/or relaxation techniques- Monitor for opioid side effects- Notify MD/LIP if interventions unsuccessful or patient reports new pain- Anticipate increased pain with activity and pre-medicate as appropriate  Outcome: Progressing     Problem: RISK FOR INFECTION - ADULT  Goal: Absence of fever/infection during anticipated neutropenic period  Description: INTERVENTIONS- Monitor WBC- Administer growth factors as ordered- Implement neutropenic guidelines  Outcome: Progressing     Problem: SAFETY ADULT - FALL  Goal: Free from fall injury  Description: INTERVENTIONS:- Assess pt frequently for physical needs- Identify cognitive and physical deficits and behaviors that affect risk of falls.- Murray fall precautions as indicated by assessment.- Educate pt/family on patient safety including physical limitations- Instruct pt to call for assistance with activity based on assessment- Modify environment to reduce risk of injury-  Provide assistive devices as appropriate- Consider OT/PT consult to assist with strengthening/mobility- Encourage toileting schedule  Outcome: Progressing     Problem: DISCHARGE PLANNING  Goal: Discharge to home or other facility with appropriate resources  Description: INTERVENTIONS:- Identify barriers to discharge w/pt and caregiver- Include patient/family/discharge partner in discharge planning- Arrange for needed discharge resources and transportation as appropriate- Identify discharge learning needs (meds, wound care, etc)- Arrange for interpreters to assist at discharge as needed- Consider post-discharge preferences of patient/family/discharge partner- Complete POLST form as appropriate- Assess patient's ability to be responsible for managing their own health- Refer to Case Management Department for coordinating discharge planning if the patient needs post-hospital services based on physician/LIP order or complex needs related to functional status, cognitive ability or social support system  Outcome: Progressing

## 2025-04-15 VITALS
RESPIRATION RATE: 16 BRPM | DIASTOLIC BLOOD PRESSURE: 62 MMHG | WEIGHT: 186.06 LBS | TEMPERATURE: 98 F | HEART RATE: 112 BPM | BODY MASS INDEX: 28.2 KG/M2 | SYSTOLIC BLOOD PRESSURE: 115 MMHG | OXYGEN SATURATION: 97 % | HEIGHT: 68 IN

## 2025-04-15 LAB
INR BLD: 1.31 (ref 0.8–1.2)
PROTHROMBIN TIME: 16.4 SECONDS (ref 11.6–14.8)

## 2025-04-15 PROCEDURE — 99239 HOSP IP/OBS DSCHRG MGMT >30: CPT | Performed by: HOSPITALIST

## 2025-04-15 RX ORDER — FUROSEMIDE 40 MG/1
40 TABLET ORAL DAILY
Qty: 30 TABLET | Refills: 0 | Status: SHIPPED | OUTPATIENT
Start: 2025-04-15 | End: 2025-05-15

## 2025-04-15 RX ORDER — WARFARIN SODIUM 5 MG/1
5 TABLET ORAL
Status: DISCONTINUED | OUTPATIENT
Start: 2025-04-15 | End: 2025-04-15

## 2025-04-15 RX ORDER — METRONIDAZOLE 500 MG/1
500 TABLET ORAL 2 TIMES DAILY
Qty: 20 TABLET | Refills: 0 | Status: SHIPPED | OUTPATIENT
Start: 2025-04-15 | End: 2025-04-28 | Stop reason: ALTCHOICE

## 2025-04-15 RX ORDER — METOPROLOL TARTRATE 75 MG/1
75 TABLET ORAL
Qty: 180 TABLET | Refills: 1 | Status: SHIPPED | OUTPATIENT
Start: 2025-04-15

## 2025-04-15 RX ORDER — METOPROLOL TARTRATE 75 MG/1
75 TABLET ORAL
Qty: 180 TABLET | Refills: 1 | Status: SHIPPED | OUTPATIENT
Start: 2025-04-15 | End: 2025-04-15

## 2025-04-15 RX ORDER — CEPHALEXIN 500 MG/1
500 CAPSULE ORAL 4 TIMES DAILY
Qty: 40 CAPSULE | Refills: 0 | Status: SHIPPED | OUTPATIENT
Start: 2025-04-15 | End: 2025-04-28 | Stop reason: ALTCHOICE

## 2025-04-15 RX ORDER — FUROSEMIDE 40 MG/1
40 TABLET ORAL 2 TIMES DAILY
Qty: 60 TABLET | Refills: 0 | Status: SHIPPED | OUTPATIENT
Start: 2025-04-15 | End: 2025-04-15

## 2025-04-15 NOTE — PLAN OF CARE
Patient is admitted for abd pain. Pt is AOX4.  VSS, afebrile and denies any pain. SpO2 maintained on RA. . Denies any SOB. Congested productive cough. Gave PRN cough med. IS.  Tele-Afib. PO Cardizem. PO Coumadin. Reg diet/ low fiber soft, pt is tolerating diet well.  Denies any n/v/d. RUQ cholecystostomy is draining bile color drainage. Daily NS flush. Voids. Up with SBA/walker. IV ABX. PO Lasix. DW, Strict I.O. ID consulted.  Will continue to monitor. Pt is updated with plan of care         Problem: PAIN - ADULT  Goal: Verbalizes/displays adequate comfort level or patient's stated pain goal  Description: INTERVENTIONS:- Encourage pt to monitor pain and request assistance- Assess pain using appropriate pain scale- Administer analgesics based on type and severity of pain and evaluate response- Implement non-pharmacological measures as appropriate and evaluate response- Consider cultural and social influences on pain and pain management- Manage/alleviate anxiety- Utilize distraction and/or relaxation techniques- Monitor for opioid side effects- Notify MD/LIP if interventions unsuccessful or patient reports new pain- Anticipate increased pain with activity and pre-medicate as appropriate  Outcome: Progressing     Problem: RISK FOR INFECTION - ADULT  Goal: Absence of fever/infection during anticipated neutropenic period  Description: INTERVENTIONS- Monitor WBC- Administer growth factors as ordered- Implement neutropenic guidelines  Outcome: Progressing     Problem: SAFETY ADULT - FALL  Goal: Free from fall injury  Description: INTERVENTIONS:- Assess pt frequently for physical needs- Identify cognitive and physical deficits and behaviors that affect risk of falls.- [place] fall precautions as indicated by assessment.- Educate pt/family on patient safety including physical limitations- Instruct pt to call for assistance with activity based on assessment- Modify environment to reduce risk of injury- Provide assistive  devices as appropriate- Consider OT/PT consult to assist with strengthening/mobility- Encourage toileting schedule  Outcome: Progressing     Problem: DISCHARGE PLANNING  Goal: Discharge to home or other facility with appropriate resources  Description: INTERVENTIONS:- Identify barriers to discharge w/pt and caregiver- Include patient/family/discharge partner in discharge planning- Arrange for needed discharge resources and transportation as appropriate- Identify discharge learning needs (meds, wound care, etc)- Arrange for interpreters to assist at discharge as needed- Consider post-discharge preferences of patient/family/discharge partner- Complete POLST form as appropriate- Assess patient's ability to be responsible for managing their own health- Refer to Case Management Department for coordinating discharge planning if the patient needs post-hospital services based on physician/LIP order or complex needs related to functional status, cognitive ability or social support system  Outcome: Progressing     Problem: Patient/Family Goals  Goal: Patient/Family Long Term Goal  Description: Patient's Long Term Goal: Discharge home   Interventions:  - Pain management   -IVF   - IV abx   - See additional Care Plan goals for specific interventions  Outcome: Progressing  Goal: Patient/Family Short Term Goal  Description: Patient's Short Term Goal: Pain management   Interventions: - PRN pain medications   - See additional Care Plan goals for specific interventions  Outcome: Progressing

## 2025-04-15 NOTE — CONSULTS
[facility]   part of [facility] ID CONSULT NOTE    Jared Nicole Patient Status:  Inpatient    1945 MRN [id number]   Location [facility] 8NE-A Attending Trent Linn,    Hosp Day # 7 PCP Eric Neil MD     Consulted by Dr. Linn    Reason for Consultation:  Abx recs for dc    History of Present Illness:  Jared Nicole is a 79 year old male with a history of A. Fib, CHF, valvular heart disease and C. diff. Patient presented with right upper quadrant pain that started . Associated symptoms include nausea. Denies any vomiting or diarrhea. Denies any fevers or chills.     Patient underwent ERCP with biliary stent placement, followed by percutaneous cholecystostomy catheter placement. Cx with E. Coli and enterococcus faecalis so far. ID consulted for abx recommendations for home.     Patient reports he is feeling better. RUQ pain has improved. He notes a cough that started after his ERCP but feels it is improving.     Afebrile.     History:  Past Medical History[1]  Past Surgical History[2]  Family History[3]   reports that he quit smoking about 43 years ago. His smoking use included cigarettes. He started smoking about 63 years ago. He has a 20 pack-year smoking history. He has never used smokeless tobacco. He reports current alcohol use. He reports that he does not use drugs.    Allergies:  Allergies[4]    Medications:  Current Hospital Medications[5]    Review of Systems:  CONSTITUTIONAL:  No weakness or fatigue.  HEENT:  Eyes:  No visual loss. Ears, Nose, Throat:  No hearing loss  SKIN:  No rash or itching.  CARDIOVASCULAR:  No chest pain  RESPIRATORY:  + cough   GASTROINTESTINAL:  + nausea, no vomiting, no diarrhea, + abdominal pain  GENITOURINARY:  No Burning on urination.   NEUROLOGICAL:  No headache  MUSCULOSKELETAL:  No joint pain.  HEMATOLOGIC:  No bleeding.  ALLERGIES:  No history of asthma    Physical Exam:  Vital signs: Blood pressure 117/68, pulse 117,  temperature 98.4 °F (36.9 °C), temperature source Oral, resp. rate 16, height 172.7 cm (5' 8\"), weight 186 lb 1.1 oz (84.4 kg), SpO2 96%.    General: Alert, oriented, NAD, on room air.   HEENT: Moist mucous membranes.   Neck: Supple.  Cardiovascular: Irregular irregular  Respiratory: Clear to auscultation bilaterally.  No wheezes. No rhonchi.  Abdomen: Soft, mild tenderness to RUQ. + drain in place  Musculoskeletal: Movement of all extremities.  Integument: No lesions. No erythema.    Laboratory Data:  Recent Labs   Lab 04/09/25  0540 04/10/25  0809 04/11/25  0602   RBC 4.51   < > 4.39   HGB 14.0   < > 13.4   HCT 41.3   < > 39.9   MCV 91.6   < > 90.9   MCH 31.0   < > 30.5   MCHC 33.9   < > 33.6   RDW 14.5   < > 14.3   NEPRELIM 13.36*  --   --    WBC 15.6*   < > 11.5*   .0   < > 150.0    < > = values in this interval not displayed.     Recent Labs   Lab 04/12/25  0903 04/13/25  0428 04/13/25  1524 04/14/25  0519 04/14/25  1532   * 106*  --  123*  --    BUN 20 20  --  21  --    CREATSERUM 1.25 1.14  --  1.23  --    CA 9.4 8.8  --  9.3  --    ALB 4.2 3.7  --  4.2  --     139  --  139  --    K 3.8 3.0* 3.6 3.6 4.1    102  --  103  --    CO2 28.0 27.0  --  24.0  --    ALKPHO 127* 109  --  113  --    AST 42* 29  --  29  --    ALT 68* 50*  --  45  --    BILT 3.2* 2.8*  --  2.2*  --    TP 7.0 6.4  --  7.1  --        Microbiology: Reviewed in EMR    Radiology: Reviewed.    PROCEDURE:  IR CHOLECYSTOSTOMY     INDICATIONS:  Acute cholecystitis.  The patient is not currently a surgical candidate for cholecystectomy.  Request for percutaneous cholecystostomy catheter placement.     TECHNIQUE:    The risks, benefits alternatives of the procedure were explained in detail prior to the exam and written informed consent was obtained.  The risks explained included but were not limited to bleeding, infection, bile leak and gallbladder or  hepatic injury.  The patient voiced understanding wished to proceed with  the exam.  A witnessed timeout was performed prior to the procedure.  3.375 g of IV Zosyn was administered prior to the procedure.  All aspects of the routine sterile barrier  technique were followed.     An IV was checked and maintained by the radiology nurse.  Moderate sedation was performed. 15 minutes of moderate sedation time was performed under my direct supervision.  The radiology nurse and myself were in attendance throughout the procedure and  sedation.  A combination of Versed and Fentanyl were utilized as the sedation agents.  The patient was assessed and continuous pulse oximetry monitoring, continuous heart rate monitoring and blood pressure monitoring was performed during the exam.       Patient was placed supine on the fluoroscopic table.  The patient's right upper quadrant was prepped and draped in usual sterile fashion.  Sonography was performed and an image of the gallbladder and liver were obtained and stored in PACs.  1% lidocaine  was then utilized as local anesthetic.  Using real-time direct sonographic guidance, a 21 gauge Chiba needle was then percutaneously passed to the right lobe of the liver into the gallbladder lumen.  Egress of bile from the needle was noted.  Gentle hand   injection of contrast confirms placement within the lumen of the gallbladder.  The micro guidewire was then advanced through the needle into the gallbladder lumen and the needle was removed.  An Accustick kit was then used.  A 0.035 J guidewire was  advanced into the lumen of the gallbladder through the Accustick kit  and a 6 Citizen of [place] dilator was advanced into the gallbladder lumen under direct fluoroscopic control.  An 8.5 Citizen of [place] all-purpose drainage catheter was then advanced over the guidewire into   the gallbladder lumen.  The pigtail was string fixed and locked.  Redundant catheter was placed into the gallbladder lumen.  Gentle hand injection of contrast confirms catheter within the gallbladder lumen.  The  contrast was then evacuated.  A small  sample of the gallbladder bowel was obtained and sent to the microbiology Department.  The catheter was sutured to the skin at the skin exit site with 0 silk suture.  The catheter was left to dependent gravity bag drainage.  The skin site was dressed  sterilely.  Patient tolerated the procedure well without immediate complication and left the department in stable condition.     COMPARISON:  [facility], US, US GALLBLADDER (CPT=76705), 4/08/2025, 2:43 PM.     FINDINGS:  Cholecystostomy am demonstrates a small filling defect within the gallbladder lumen which could reflect gallstone or tumefactive sludge.    Impression:    CONCLUSION:  Successful fluoroscopic and sonographic guided placement of a percutaneous cholecystostomy catheter as detailed above.  Unless there is interim cholecystectomy before 6 weeks, the catheter should be left in place for a minimum of 6 weeks.     LOCATION:  [place]     Dictated by (CST): Gianni Vang MD on 4/11/2025 at 9:08 PM      Finalized by (CST): Gianni Vang MD on 4/11/2025 at 9:14 PM      PROCEDURE:  CT ANGIOGRAPHY, CHEST (CPT=71275)     COMPARISON:  [facility], US, US GALLBLADDER (CPT=76705), 4/08/2025, 2:43 PM.  [place], CT, CTA CHEST, 1/26/2010, 2:07 PM.     INDICATIONS:  elevated d-dimer, dyspnea/hypoxia     TECHNIQUE:  IV contrast-enhanced multislice CT angiography is performed through the pulmonary arterial anatomy. 3D volume renderings are generated.  Dose reduction techniques were used. Dose information is transmitted to the ACR (American College of  Radiology) NRDR (National Radiology Data Registry) which includes the Dose Index Registry.     PATIENT STATED HISTORY:(As transcribed by Technologist)  Patient is here for elevated d-dimer, dyspnea, hypoxia.      CONTRAST USED:  100cc of Isovue 370     FINDINGS:    VASCULATURE:  Normal 3 vessel arch.  No evidence of aortic aneurysm or dissection.  Minimal atheromatous  calcifications of the aorta.  No acute pulmonary embolus.  LUNGS:  Dependent atelectasis bilaterally.  Ground-glass opacities within the right upper and right lower lobe.  Mild interlobular septal thickening.  Minimal ground-glass opacities within the left lower lobe.  MEDIASTINUM/JERSON:  Mildly prominent mediastinal lymph nodes.  PLEURA:  Trace bilateral pleural effusions.  CARDIAC:  Cardiomegaly.  Coronary artery calcifications.  CHEST WALL:  Normal.  LIMITED ABDOMEN:  Nodular contour to the liver.  Intrahepatic biliary air is noted.  There is gallbladder wall thickening.  Bilateral renal cysts.  BONES:  Degenerative changes of the thoracic spine.    Impression:    CONCLUSION:    1. No acute pulmonary embolus.  2. Scattered bilateral ground-glass opacities which may represent pneumonia.  3. Mild interlobular septal thickening suggestive of edema.  4. Trace bilateral pleural effusions, right greater than left.  5. Mildly prominent mediastinal lymph nodes, likely reactive in nature.  6. There is mild gallbladder wall thickening.  Correlation with clinical symptoms to exclude acute cholecystitis.    7. Intrahepatic biliary air secondary to common bile duct stent partially visualized.    LOCATION:  [place]    Dictated by (CST): Macario Del Cid MD on 4/10/2025 at 10:24 AM      Finalized by (CST): Macario Del Cid MD on 4/10/2025 at 10:27 AM        PROCEDURE:  XR CHEST AP PORTABLE  (CPT=71045)     TECHNIQUE:  AP chest radiograph was obtained.     COMPARISON:  [facility], XR, XR CHEST PA + LAT CHEST (CPT=71046), 5/10/2021, 3:48 PM.  [facility] , XR, XR CHEST AP PORTABLE  (CPT=71010), 2/11/2015, 1:47 PM.     INDICATIONS:  shortness of breath     PATIENT STATED HISTORY: (As transcribed by Technologist)        FINDINGS:  Cardiac size is stable.  Interstitial opacities bilaterally.  Patchy opacities within the left lung.  Atheromatous calcifications of the aorta.     Impression:    CONCLUSION:    1. Cardiomegaly with  interstitial opacities likely representing edema.  2. Patchy opacities within the left lung may represent asymmetric edema versus atelectasis/infiltrates .  3. Preliminary report provided by [facility] at time of examination.    LOCATION:  [place]     Dictated by (CST): Macario Del Cid MD on 4/10/2025 at 6:00 AM      Finalized by (CST): Macario Del Cid MD on 4/10/2025 at 6:01 AM      PROCEDURE:  XR ENDO BILE DUCT (CPT=74328)     COMPARISON:  None.     INDICATIONS:  ercp     FLUOROSCOPY IMAGES OBTAINED:  4  FLUOROSCOPY TIME:  1 min 36 sec  TECHNOLOGIST TIME:  15 min  RADIATION DOSE (AIR KERMA PRODUCT):  31.96mGy      FINDINGS:    Fluoroscopy provided for guidance. No radiologist was present for the procedure. There are expected intraoperative changes present. Please refer to the operative report for further details.     Impression:    CONCLUSION:  See above.     LOCATION:  [id number]     Dictated by (CST): Alfredo Daniels MD on 4/09/2025 at 5:01 PM      Finalized by (CST): Alfredo Daniels MD on 4/09/2025 at 5:01 PM        PROCEDURE:  US GALLBLADDER (CPT=76705)     COMPARISON:  [facility], US ABDOMEN COMPLETE (CPT=76700), 2/11/2015, 5:12 PM.     INDICATIONS:  injured back week ago, having abd. pain     TECHNIQUE:  Real time gray-scale ultrasound was used to evaluate the gallbladder.       PATIENT STATED HISTORY: (As transcribed by Technologist)           FINDINGS:       BILIARY:  No evidence of gallstones, pericholecystic fluid or gallbladder wall thickening.  However the gallbladder is prominent in size measuring up to 12.8 x 4.2 x 4.8 cm and the common duct is also enlarged measuring up to 19 mm.  There is probable  intrahepatic biliary ductal dilatation as well.  A discrete obstructing lesion is not able to be clearly visualized.  The pancreas was not able to be clearly identified as well due to overlying bowel gas.  No sonographic Antonio sign is seen.                  Impression:    CONCLUSION:  The central common  bile duct and pancreas were not able to be clearly identified due to overlying bowel gas.  There is known dilatation of the gallbladder and of the intra and extrahepatic bile ducts.  Again these structures could be best  assessed with ERCP or MRCP.        LOCATION:  [id number]           Dictated by (CST): Alfredo Daniels MD on 4/08/2025 at 3:22 PM      Finalized by (CST): Alfredo Daniels MD on 4/08/2025 at 3:25 PM      PROCEDURE:  CT ABDOMEN+PELVIS (CONTRAST ONLY) (CPT=74177)     COMPARISON:  [facility], CT, CT ABDOMEN+PELVIS(CONTRAST ONLY)(CPT=74177), 2/01/2016, 9:18 AM.     INDICATIONS:  injured back week ago, having abd. pain     TECHNIQUE:  CT scanning was performed from the dome of the diaphragm to the pubic symphysis with non-ionic intravenous contrast material. Post contrast coronal MPR imaging was performed.  Dose reduction techniques were used. Dose information is  transmitted to the ACR (American College of Radiology) NRDR (National Radiology Data Registry) which includes the Dose Index Registry.     PATIENT STATED HISTORY:(As transcribed by Technologist)  Patient complains of back pain and right sided abdomen pain after moving a TV two days ago.      CONTRAST USED:  100cc of Isovue 370     FINDINGS:    LIVER:  No focal lesion.  Heterogeneous enhancement, likely transient attenuation difference, this was partially present on the prior exam of 2/1/2016.  BILIARY:  Mild gallbladder distension.  The common bile duct is dilated up to 1.9 cm with mild intrahepatic biliary ductal dilation..  There is a luminal filling defect within the distal common bile duct (series 3, image 49).  PANCREAS:  No lesion, fluid collection, ductal dilatation, or atrophy.    SPLEEN:  No enlargement or focal lesion.    KIDNEYS:  Renal cysts.  No hydronephrosis or obstructing nephrolithiasis.  No enhancing lesions.  ADRENALS:  No mass or enlargement.    AORTA/VASCULAR:  Atherosclerotic disease without aneurysm  RETROPERITONEUM:  No  adenopathy  BOWEL/MESENTERY:  No dilated bowel or wall thickening.  Colonic diverticulosis, without diverticulitis.  Normal appendix.  ABDOMINAL WALL:  Ventral abdominal hernia containing unobstructed colon.  URINARY BLADDER:  No visible focal wall thickening, lesion, or calculus.    PELVIC NODES:  No adenopathy.    PELVIC ORGANS:  No visible mass.  Pelvic organs appropriate for patient age.    BONES:  Scoliosis and degenerative changes of the spine.  Chronic L1 compression fracture.  LUNG BASES:  Bibasilar interstitial changes.  OTHER:  Negative.                    Impression:    CONCLUSION:    1. Dilation of the common bile duct and gallbladder distension with luminal filling defect within the distal common bile duct.  This could represent calculus, sludge, versus neoplasm.  Recommend further evaluation with ERCP.  2. Ventral abdominal hernia containing unobstructed transverse colon.  3. Colonic diverticulosis.  4. Bibasilar interstitial opacities, which may reflect infectious/inflammatory process versus fibrotic lung disease.        LOCATION:  [facility]        Dictated by (CST): Darron Segura MD on 4/08/2025 at 1:39 PM      Finalized by (CST): Darron Segura MD on 4/08/2025 at 1:44 PM        PROCEDURE:  CT SPINE LUMBAR (CPT=72131)     COMPARISON:  [facility], CT, CT ABDOMEN+PELVIS(CONTRAST ONLY)(CPT=74177), 2/01/2016, 9:18 AM.     INDICATIONS:  injured back week ago, having abd. pain     TECHNIQUE:  Noncontrast CT scanning is performed through the lumbar spine.  Multiplanar reconstructions are generated.  Dose reduction techniques were used. Dose information is transmitted to the ACR (American College of Radiology) NRDR (National  Radiology Data Registry) which includes the Dose Index Registry.     PATIENT STATED HISTORY: (As transcribed by Technologist)  Patient complains of back pain and right sided abdomen pain after moving a TV two days ago.         FINDINGS:    There is sigmoid scoliosis.  Multilevel endplate  degenerative changes with osteophyte formation.  There is degenerative disc disease with disc height loss and vacuum disc phenomena.  There is a chronic appearing compression fracture at L1.  There is no  significant listhesis.  No acute fracture.       LUMBAR DISC LEVELS:  L1-L2:  No significant canal or neural foraminal stenosis.  L2-L3:  No significant canal or neural foraminal stenosis.  L3-L4:  Moderate left and mild right neural foraminal stenosis.  Mild canal stenosis.  There is facet joint arthropathy and broad-based posterior disc osteophyte complex.  L4-L5:  Facet joint arthropathy and posterior disc osteophyte complex with minimal canal stenosis, moderate to severe left and mild-to-moderate right neural foraminal stenosis.  L5-S1:  Facet joint arthropathy and posterior disc osteophyte complex with moderate to severe bilateral neural foraminal stenosis and minimal canal stenosis.                  Impression:    CONCLUSION:    1. No acute lumbar spine fracture.  Chronic L1 compression fracture.  2. Scoliosis and degenerative changes of the spine with associated canal and neural foraminal stenosis as detailed.        LOCATION:  [facility]        Dictated by (CST): Darron Segura MD on 4/08/2025 at 1:32 PM      Finalized by (CST): Darron Segura MD on 4/08/2025 at 1:36 PM      ASSESSMENT:  Choledocholithasis, cholecystitis  S/p ERCP with biliary stent placement 4/9- stones not extracted d/t elevated INR  S/p percutaneous cholecystostomy catheter placement 4/11, cx with E. Coli and enterococcus faecalis so far  Elevated LFTs, related to above, improving  Acute CHF with bilateral pleural effusions  A. Fib, valvular heart disease, pHTN  History of c. Diff with fecal transplant about 10 years ago per patient's wife.    PLAN:  - continue IV Zosyn with plans to transition to po abx at the time of dc  - await final culture from IR drainage  - follow temps and wbc  - follow abdominal exam  - reviewed labs, micro, imaging  reports, available old records    Discussed case with RN, jesus, Dr. Monique, patient and patient's wife.     Thank you for allowing us to participate in the care of this patient. Please do not hesitate to call if you have any questions.   We will continue to follow with you and will make further recommendations based on his progress.    [facility]  [phone]  4/15/2025         [1]   Past Medical History:   Anemia    Arrhythmia    atrial fibrillation    Atrial fibrillation (HCC)    Cancer (HCC)    SKIN    CHF (congestive heart failure) (HCC)    mild     Colitis    DIVERTICULITIS    Colonic diverticular abscess    Congestive heart disease (HCC)    Hx musculoskletl dis NEC    BACK/DISC ISSUES    Long term (current) use of anticoagulants    Mitral regurgitation    Mild to moderate     Mitral valve prolapse    Monitoring for long-term anticoagulant use    Other and unspecified hyperlipidemia    Perforated bowel (HCC)    Prediabetes    SOB (shortness of breath)    Unspecified essential hypertension    Visual impairment   [2]   Past Surgical History:  Procedure Laterality Date    Colectomy      Colon surgery      Colonoscopy  4/2016    No polyps/inflammation noted, moderate to severe diverticulosis. Fecal transplant completed.    Colostomy      with reversal 2012    Other surgical history Left 2001    eye surgery, detached retina    Other surgical history N/A 4/12/2016    Procedure: COLONOSCOPY W/FECAL MICROBIOTA TRANSPLANT;  Surgeon: Dave Valle MD;  Location:  ENDOSCOPY   [3]   Family History  Problem Relation Age of Onset    Lung Disorder Other         COPD, Family History of     Cancer Mother         Colon, Family History of    [4] No Known Allergies  [5]   Current Facility-Administered Medications:     pantoprazole (Protonix) DR tab 40 mg, 40 mg, Oral, QAM AC    dilTIAZem ER (Dilacor XR) 24 hr cap 120 mg, 120 mg, Oral, Daily    furosemide (Lasix) tab 40  mg, 40 mg, Oral, BID (Diuretic)    acetaminophen (Tylenol Extra Strength) tab 500 mg, 500 mg, Oral, Q4H PRN    piperacillin-tazobactam (Zosyn) 3.375 g in dextrose 5% 100 mL IVPB-ADDV, 3.375 g, Intravenous, Q8H    metoprolol tartrate (Lopressor) tab 75 mg, 75 mg, Oral, 2x Daily(Beta Blocker)    metoprolol (Lopressor) 5 mg/5mL injection 5 mg, 5 mg, Intravenous, Q6H PRN    dilTIAZem 10 mg BOLUS FROM BAG infusion, 10 mg, Intravenous, Q1H PRN    dilTIAZem (cardIZEM) 100 mg in sodium chloride 0.9% 100 mL IVPB-ADDV, 2.5-20 mg/hr, Intravenous, Continuous    HYDROcodone-acetaminophen (Norco) 5-325 MG per tab 1 tablet, 1 tablet, Oral, Q6H PRN    dextromethorphan-guaiFENesin (Robitussin-DM)  mg/5mL oral solution 5 mL, 5 mL, Oral, Q6H PRN    tamsulosin (Flomax) cap 0.4 mg, 0.4 mg, Oral, Daily    melatonin tab 3 mg, 3 mg, Oral, Nightly PRN    ondansetron (Zofran) 4 MG/2ML injection 4 mg, 4 mg, Intravenous, Q6H PRN    metoclopramide (Reglan) 5 mg/mL injection 5 mg, 5 mg, Intravenous, Q8H PRN    HYDROmorphone (Dilaudid) 1 MG/ML injection 0.2 mg, 0.2 mg, Intravenous, Q2H PRN **OR** HYDROmorphone (Dilaudid) 1 MG/ML injection 0.4 mg, 0.4 mg, Intravenous, Q2H PRN **OR** HYDROmorphone (Dilaudid) 1 MG/ML injection 0.8 mg, 0.8 mg, Intravenous, Q2H PRN    polyethylene glycol (PEG 3350) (Miralax) 17 g oral packet 17 g, 17 g, Oral, Daily PRN    sennosides (Senokot) tab 17.2 mg, 17.2 mg, Oral, Nightly PRN    bisacodyl (Dulcolax) 10 MG rectal suppository 10 mg, 10 mg, Rectal, Daily PRN    fleet enema (Fleet) rectal enema 133 mL, 1 enema, Rectal, Once PRN

## 2025-04-15 NOTE — PROGRESS NOTES
Cardiology Progress Note    [facility]   part of [facility]      Jared Nicole Patient Status:  Inpatient    1945 MRN [id number]   Location [facility] 8NE-A Attending Trent Linn,    Hosp Day # 4 PCP Eric Neil MD     Impression:  79 year old male presenting with abdominal pain  Cholecystitis s/p biliary stent, now planning cholecystectomy as OP  Acute hypoxemic resp failure due to acute HFpEF    - Negative 2.5 L   - weight from 194 lb on admission to 186 lb this morning   - Currently on room air. O2 weaned off   Persistent AF, rate controlled  On dilt drip  CHADS VASC 3 on coumadin - reversed this admit  Mod MR/TR  Mild pulm HTN  COPD  H/o smoking  HLD    Recommendations:  S/p IV lasix, now compensated, continue maintenance dose lasix 40 mg PO BID  Continue diltiazem 120mg daily, weaned off drip   Continue metoprolol tartrate 75 mg BID  Daily weights strict I&Os  Given acute decompensated HF, surgery deferred. Cholecystostomy placed. Plan for cholecystectomy in as outpatient once more stable from CV stand point.   Restart coumadin when OK per surgery and IR. If coumadin will be on hold for extended period, consider heparin IV bridge.  Statin on hold for elevated LFTs, resume when able  Maintain normal electrolyte levels    Subjective: No CP or SOB. No sig abd pain.     Medications:  Current Hospital Medications[1]    Past Medical History[2]    Past Surgical History[3]    Family History  family history includes Cancer in his mother; Lung Disorder in an other family member.    Social History   reports that he quit smoking about 43 years ago. His smoking use included cigarettes. He started smoking about 63 years ago. He has a 20 pack-year smoking history. He has never used smokeless tobacco. He reports current alcohol use. He reports that he does not use drugs.     Allergies  Allergies[4]    Review of Systems:  As per HPI, otherwise 10 point ROS is negative in  detail.    Physical Exam:  Blood pressure 117/68, pulse 117, temperature 98.4 °F (36.9 °C), temperature source Oral, resp. rate 16, height 68\", weight 186 lb 1.1 oz (84.4 kg), SpO2 96%.  Temp (24hrs), Av.1 °F (36.7 °C), Min:97.3 °F (36.3 °C), Max:98.6 °F (37 °C)    Wt Readings from Last 3 Encounters:   04/15/25 186 lb 1.1 oz (84.4 kg)   24 197 lb (89.4 kg)   24 197 lb (89.4 kg)       General: Awake and alert; in no acute distress  HEENT: Extraocular movements are intact; sclerae are anicteric; scalp is atraumatic  Neck: Supple; no JVD; no carotid bruits  Cardiac: Irreg ireg, variable S1, nl S2,  no murmurs, rubs, or gallops are appreciated  Lungs: Clear to auscultation bilaterally; no accessory muscle use is noted, no wheezes, rhonci or rales  Abdomen: Soft, non-distended, non-tender; bowel sounds are normoactive  Extremities: Warm, trace LE edema, clubbing or cyanosis; moves all 4 extremities normally, distal pulses intact and equal  Psychiatric: Normal mood and affect; answers questions appropriately  Dermatologic: No rashes; normal skin turgor    Diagnostic testing:    Labs:   Lab Results   Component Value Date    PT 26.4 (H) 2014    PT 24.7 (H) 2014    INR 1.31 (H) 04/15/2025    INR 1.24 (H) 2025        Lab Results   Component Value Date    K 4.1 2025    INR 1.31 04/15/2025    PTP 16.4 04/15/2025       Cardiac diagnostics:    EKG 4/10/2025: AF RBBB    Echo 4/10/2025:  1. Left ventricle: The cavity size was normal. Wall thickness was normal.      Systolic function was normal. The estimated ejection fraction was 55-60%,      by visual assessment. No diagnostic evidence for regional wall motion      abnormalities. Unable to assess LV diastolic function due to heart      rhythm.   2. Right ventricle: The cavity size was increased.   3. Left atrium: The left atrial volume was moderately increased.   4. Right atrium: The atrium was markedly dilated.   5. Mitral valve: There was  moderate regurgitation.   6. Tricuspid valve: There was moderate regurgitation.   7. Pulmonary arteries: Systolic pressure was mildly increased, in the range      of 40mm Hg to 45mm Hg. Estimated pulmonary artery diastolic pressure was      6mm Hg.   8. Pericardium, extracardiac: There was a left pleural effusion.     CTA chest 4/10/2025  1. No acute pulmonary embolus.   2. Scattered bilateral ground-glass opacities which may represent pneumonia.   3. Mild interlobular septal thickening suggestive of edema.   4. Trace bilateral pleural effusions, right greater than left.   5. Mildly prominent mediastinal lymph nodes, likely reactive in nature.   6. There is mild gallbladder wall thickening.  Correlation with clinical symptoms to exclude acute cholecystitis.    7. Intrahepatic biliary air secondary to common bile duct stent partially visualized.         Thank you for allowing our practice to participate in the care of your patient. Please do not hesitate to contact me if you have any questions.             [1]   Current Facility-Administered Medications   Medication Dose Route Frequency    pantoprazole (Protonix) DR tab 40 mg  40 mg Oral QAM AC    dilTIAZem ER (Dilacor XR) 24 hr cap 120 mg  120 mg Oral Daily    furosemide (Lasix) tab 40 mg  40 mg Oral BID (Diuretic)    acetaminophen (Tylenol Extra Strength) tab 500 mg  500 mg Oral Q4H PRN    piperacillin-tazobactam (Zosyn) 3.375 g in dextrose 5% 100 mL IVPB-ADDV  3.375 g Intravenous Q8H    metoprolol tartrate (Lopressor) tab 75 mg  75 mg Oral 2x Daily(Beta Blocker)    metoprolol (Lopressor) 5 mg/5mL injection 5 mg  5 mg Intravenous Q6H PRN    dilTIAZem 10 mg BOLUS FROM BAG infusion  10 mg Intravenous Q1H PRN    dilTIAZem (cardIZEM) 100 mg in sodium chloride 0.9% 100 mL IVPB-ADDV  2.5-20 mg/hr Intravenous Continuous    HYDROcodone-acetaminophen (Norco) 5-325 MG per tab 1 tablet  1 tablet Oral Q6H PRN    dextromethorphan-guaiFENesin (Robitussin-DM)  mg/5mL oral  solution 5 mL  5 mL Oral Q6H PRN    tamsulosin (Flomax) cap 0.4 mg  0.4 mg Oral Daily    melatonin tab 3 mg  3 mg Oral Nightly PRN    ondansetron (Zofran) 4 MG/2ML injection 4 mg  4 mg Intravenous Q6H PRN    metoclopramide (Reglan) 5 mg/mL injection 5 mg  5 mg Intravenous Q8H PRN    HYDROmorphone (Dilaudid) 1 MG/ML injection 0.2 mg  0.2 mg Intravenous Q2H PRN    Or    HYDROmorphone (Dilaudid) 1 MG/ML injection 0.4 mg  0.4 mg Intravenous Q2H PRN    Or    HYDROmorphone (Dilaudid) 1 MG/ML injection 0.8 mg  0.8 mg Intravenous Q2H PRN    polyethylene glycol (PEG 3350) (Miralax) 17 g oral packet 17 g  17 g Oral Daily PRN    sennosides (Senokot) tab 17.2 mg  17.2 mg Oral Nightly PRN    bisacodyl (Dulcolax) 10 MG rectal suppository 10 mg  10 mg Rectal Daily PRN    fleet enema (Fleet) rectal enema 133 mL  1 enema Rectal Once PRN   [2]   Past Medical History:   Anemia    Arrhythmia    atrial fibrillation    Atrial fibrillation (HCC)    Cancer (HCC)    SKIN    CHF (congestive heart failure) (HCC)    mild     Colitis    DIVERTICULITIS    Colonic diverticular abscess    Congestive heart disease (HCC)    Hx musculoskletl dis NEC    BACK/DISC ISSUES    Long term (current) use of anticoagulants    Mitral regurgitation    Mild to moderate     Mitral valve prolapse    Monitoring for long-term anticoagulant use    Other and unspecified hyperlipidemia    Perforated bowel (HCC)    Prediabetes    SOB (shortness of breath)    Unspecified essential hypertension    Visual impairment   [3]   Past Surgical History:  Procedure Laterality Date    Colectomy      Colon surgery      Colonoscopy  4/2016    No polyps/inflammation noted, moderate to severe diverticulosis. Fecal transplant completed.    Colostomy      with reversal 2012    Other surgical history Left 2001    eye surgery, detached retina    Other surgical history N/A 4/12/2016    Procedure: COLONOSCOPY W/FECAL MICROBIOTA TRANSPLANT;  Surgeon: Dave Valle MD;  Location:   ENDOSCOPY   [4] No Known Allergies

## 2025-04-15 NOTE — CONSULTS
INFECTIOUS DISEASE CONSULTATION    Jared Nicole Patient Status:  Inpatient    1945 MRN [id number]   [facility] 8NE-A Attending Trent Linn DO   Hosp Day # 7 PCP Eric Neil MD       Requested by Dr. Linn    Reason for Consultation:  E coli and Enterococcus faecalis isolated from from fluid sent during cholecystostomy    History of Present Illness:  Jared Nicole is a a(n) 79 year old male admitted  with right upper quadrant pain and CT findings dilated GB and CBD.   He underwent ERCP and billiary stent, as well as cholecystostomy drain.         History:  Past Medical History[1]  Past Surgical History[2]  Family History[3]   reports that he quit smoking about 43 years ago. His smoking use included cigarettes. He started smoking about 63 years ago. He has a 20 pack-year smoking history. He has never used smokeless tobacco. He reports current alcohol use. He reports that he does not use drugs.      Allergies:  Allergies[4]    Medications:  Current Hospital Medications[5]  Medications Ordered Prior to Encounter[6]    Review of Systems:    A comprehensive 10 point review of systems was completed.  Pertinent positives and negatives noted in the the HPI.      Physical Exam:    General: No acute distress. Alert and oriented x 3.  Vital signs: Temp:  [97.3 °F (36.3 °C)-98.5 °F (36.9 °C)] 98.3 °F (36.8 °C)  Pulse:  [] 97  Resp:  [14-18] 18  BP: (104-119)/(65-82) 113/74  SpO2:  [90 %-97 %] 96 %  Body mass index is 28.29 kg/m².  HEENT: Moist mucous membranes. Extraocular muscles are intact.  Neck: No swelling, no masses  Respiratory: Non labored, symmetric exursion  Cardiovascular: No irregularities in rhythm  Abdomen: Soft, nontender, nondistended.  Drain inp lace  Musculoskeletal: Full range of motion of all extremities.  No swelling noted.  Joints: no effusions  Skin: No lesions. No erythema, no open wounds    Laboratory  Data:  Laboratory data reviewed      Recent Labs   Lab 04/09/25  0540 04/10/25  0809 04/11/25  0602   RBC 4.51   < > 4.39   HGB 14.0   < > 13.4   HCT 41.3   < > 39.9   MCV 91.6   < > 90.9   MCH 31.0   < > 30.5   MCHC 33.9   < > 33.6   RDW 14.5   < > 14.3   NEPRELIM 13.36*  --   --    WBC 15.6*   < > 11.5*   .0   < > 150.0    < > = values in this interval not displayed.       Recent Labs   Lab 04/12/25  0903 04/13/25  0428 04/13/25  1524 04/14/25  0519 04/14/25  1532   * 106*  --  123*  --    BUN 20 20  --  21  --    CREATSERUM 1.25 1.14  --  1.23  --    CA 9.4 8.8  --  9.3  --    ALB 4.2 3.7  --  4.2  --     139  --  139  --    K 3.8 3.0* 3.6 3.6 4.1    102  --  103  --    CO2 28.0 27.0  --  24.0  --    ALKPHO 127* 109  --  113  --    AST 42* 29  --  29  --    ALT 68* 50*  --  45  --    BILT 3.2* 2.8*  --  2.2*  --    TP 7.0 6.4  --  7.1  --          Lab Results   Component Value Date    INR 1.31 04/15/2025    PTP 16.4 04/15/2025         Microbiologic Data:   Hospital Encounter on 04/08/25   1. Body Fluid Cult Aerobic and Anaerobic     Status: Abnormal (Preliminary result)    Collection Time: 04/11/25  7:25 PM    Specimen: Abdominal fluid; Body fluid, unspecified   Result Value Ref Range    Body Fluid Culture Result Escherichia coli (A) N/A    Body Fluid Culture Result Enterococcus faecalis (A) N/A    Body Fluid Smear Gram stain of positive bottle shows: (A) N/A    Body Fluid Smear Gram Positive Cocci (A) N/A    Body Fluid Smear Gram Negative Rods (A) N/A       Susceptibility    Escherichia coli -  (no method available)     Ampicillin 16 Intermediate      Cefazolin <=4 Sensitive      Ciprofloxacin >=4 Resistant      Gentamicin <=1 Sensitive      Meropenem <=0.25 Sensitive      Levofloxacin >=8 Resistant      Piperacillin + Tazobactam <=4 Sensitive      Trimethoprim/Sulfa <=20 Sensitive     Enterococcus faecalis -  (no method available)     Ampicillin <=2 Sensitive      Vancomycin 2  Sensitive        Established Problem list:  Problem List[7]    ASSESSMENT/PLAN:  1. Cholecysititis and choledocholithiasis  SP ERCP and cholecystostomy  -E coli and Enterococcus faecalis isolate  E coli is R to quinolones    Consider discharege on PO Cephalexin + PO flagyl  -does not need specific coverage for E faecalis    2. Remote ho of CDIFF 10 years ago  Will be on flagyl which may be protective  But if develops CDIFF infection recommend PO vancomycin or fidaxomicin          Himanshu Monique MD, MD [facility]  [phone]       [1]   Past Medical History:   Anemia    Arrhythmia    atrial fibrillation    Atrial fibrillation (HCC)    Cancer (HCC)    SKIN    CHF (congestive heart failure) (HCC)    mild     Colitis    DIVERTICULITIS    Colonic diverticular abscess    Congestive heart disease (HCC)    Hx musculoskletl dis NEC    BACK/DISC ISSUES    Long term (current) use of anticoagulants    Mitral regurgitation    Mild to moderate     Mitral valve prolapse    Monitoring for long-term anticoagulant use    Other and unspecified hyperlipidemia    Perforated bowel (HCC)    Prediabetes    SOB (shortness of breath)    Unspecified essential hypertension    Visual impairment   [2]   Past Surgical History:  Procedure Laterality Date    Colectomy      Colon surgery      Colonoscopy  4/2016    No polyps/inflammation noted, moderate to severe diverticulosis. Fecal transplant completed.    Colostomy      with reversal 2012    Other surgical history Left 2001    eye surgery, detached retina    Other surgical history N/A 4/12/2016    Procedure: COLONOSCOPY W/FECAL MICROBIOTA TRANSPLANT;  Surgeon: Dave Valle MD;  Location:  ENDOSCOPY   [3]   Family History  Problem Relation Age of Onset    Lung Disorder Other         COPD, Family History of     Cancer Mother         Colon, Family History of    [4] No Known Allergies  [5]   Current Facility-Administered Medications:     warfarin (Coumadin)  tab 5 mg, 5 mg, Oral, Once at night    pantoprazole (Protonix) DR tab 40 mg, 40 mg, Oral, QAM AC    dilTIAZem ER (Dilacor XR) 24 hr cap 120 mg, 120 mg, Oral, Daily    furosemide (Lasix) tab 40 mg, 40 mg, Oral, BID (Diuretic)    acetaminophen (Tylenol Extra Strength) tab 500 mg, 500 mg, Oral, Q4H PRN    piperacillin-tazobactam (Zosyn) 3.375 g in dextrose 5% 100 mL IVPB-ADDV, 3.375 g, Intravenous, Q8H    metoprolol tartrate (Lopressor) tab 75 mg, 75 mg, Oral, 2x Daily(Beta Blocker)    metoprolol (Lopressor) 5 mg/5mL injection 5 mg, 5 mg, Intravenous, Q6H PRN    dilTIAZem 10 mg BOLUS FROM BAG infusion, 10 mg, Intravenous, Q1H PRN    dilTIAZem (cardIZEM) 100 mg in sodium chloride 0.9% 100 mL IVPB-ADDV, 2.5-20 mg/hr, Intravenous, Continuous    HYDROcodone-acetaminophen (Norco) 5-325 MG per tab 1 tablet, 1 tablet, Oral, Q6H PRN    dextromethorphan-guaiFENesin (Robitussin-DM)  mg/5mL oral solution 5 mL, 5 mL, Oral, Q6H PRN    tamsulosin (Flomax) cap 0.4 mg, 0.4 mg, Oral, Daily    melatonin tab 3 mg, 3 mg, Oral, Nightly PRN    ondansetron (Zofran) 4 MG/2ML injection 4 mg, 4 mg, Intravenous, Q6H PRN    metoclopramide (Reglan) 5 mg/mL injection 5 mg, 5 mg, Intravenous, Q8H PRN    HYDROmorphone (Dilaudid) 1 MG/ML injection 0.2 mg, 0.2 mg, Intravenous, Q2H PRN **OR** HYDROmorphone (Dilaudid) 1 MG/ML injection 0.4 mg, 0.4 mg, Intravenous, Q2H PRN **OR** HYDROmorphone (Dilaudid) 1 MG/ML injection 0.8 mg, 0.8 mg, Intravenous, Q2H PRN    polyethylene glycol (PEG 3350) (Miralax) 17 g oral packet 17 g, 17 g, Oral, Daily PRN    sennosides (Senokot) tab 17.2 mg, 17.2 mg, Oral, Nightly PRN    bisacodyl (Dulcolax) 10 MG rectal suppository 10 mg, 10 mg, Rectal, Daily PRN    fleet enema (Fleet) rectal enema 133 mL, 1 enema, Rectal, Once PRN  [6]   No current facility-administered medications on file prior to encounter.     Current Outpatient Medications on File Prior to Encounter   Medication Sig Dispense Refill    DILTIAZEM   MG Oral Capsule SR 24 Hr TAKE 1 CAPSULE EVERY DAY 90 capsule 3    ATORVASTATIN 10 MG Oral Tab TAKE 1 TABLET EVERY DAY 90 tablet 3    METOPROLOL TARTRATE 50 MG Oral Tab TAKE 1 TABLET TWICE DAILY 180 tablet 3    TAMSULOSIN 0.4 MG Oral Cap TAKE 1 CAPSULE EVERY DAY 90 capsule 3    warfarin 2.5 MG Oral Tab TAKE ONE TABLET BY MOUTH 2 DAYS WEEKLY OR AS DIRECTED BY ANTICOAGULATION CLINIC. 30 tablet 3    warfarin 5 MG Oral Tab TAKE ONE TABLET BY MOUTH 5 DAYS WEEKLY OR AS DIRECTED BY ANTICOAGULATION CLINIC. 65 tablet 3    ascorbic acid (VITAMIN C) 500 MG Oral Tab Take 1 tablet (500 mg total) by mouth daily.      Cholecalciferol (VITAMIN D) 1000 UNITS Oral Cap Take 1 Cap by mouth daily.     [7]   Patient Active Problem List  Diagnosis    Chronic systolic congestive heart failure (HCC)    Mixed hyperlipidemia    Mitral regurgitation    Prediabetes    COPD (chronic obstructive pulmonary disease) (HCC)    Monitoring for long-term anticoagulant use    Permanent atrial fibrillation (HCC)    Aortic atherosclerosis    Aortic valve sclerosis    Benign prostatic hyperplasia with urinary hesitancy    Primary hypertension    Common bile duct dilatation    Cholangitis (HCC)    Choledocholithiasis    Calculus of gallbladder with acute cholecystitis and obstruction    Anticoagulated on Coumadin    Paroxysmal atrial fibrillation (HCC)    Acute diastolic heart failure (HCC)

## 2025-04-15 NOTE — PLAN OF CARE
Patient Alert and oriented x 4. Up with stand by assist and walker. On room air. A-fib on tele. Continent of bowel and bladder. No complaint of pain, chest pain, and/or SOB. POC discussed with patient. Call light within reach. Fall precaution in place.    Problem: PAIN - ADULT  Goal: Verbalizes/displays adequate comfort level or patient's stated pain goal  Description: INTERVENTIONS:- Encourage pt to monitor pain and request assistance- Assess pain using appropriate pain scale- Administer analgesics based on type and severity of pain and evaluate response- Implement non-pharmacological measures as appropriate and evaluate response- Consider cultural and social influences on pain and pain management- Manage/alleviate anxiety- Utilize distraction and/or relaxation techniques- Monitor for opioid side effects- Notify MD/LIP if interventions unsuccessful or patient reports new pain- Anticipate increased pain with activity and pre-medicate as appropriate  Outcome: Progressing     Problem: RISK FOR INFECTION - ADULT  Goal: Absence of fever/infection during anticipated neutropenic period  Description: INTERVENTIONS- Monitor WBC- Administer growth factors as ordered- Implement neutropenic guidelines  Outcome: Progressing     Problem: SAFETY ADULT - FALL  Goal: Free from fall injury  Description: INTERVENTIONS:- Assess pt frequently for physical needs- Identify cognitive and physical deficits and behaviors that affect risk of falls.- Union fall precautions as indicated by assessment.- Educate pt/family on patient safety including physical limitations- Instruct pt to call for assistance with activity based on assessment- Modify environment to reduce risk of injury- Provide assistive devices as appropriate- Consider OT/PT consult to assist with strengthening/mobility- Encourage toileting schedule  Outcome: Progressing     Problem: Patient/Family Goals  Goal: Patient/Family Long Term Goal  Description: Patient's Long Term  Goal: Discharge home   Interventions:  - Pain management   -IVF   - IV abx   - See additional Care Plan goals for specific interventions  Outcome: Progressing  Goal: Patient/Family Short Term Goal  Description: Patient's Short Term Goal: Pain management   Interventions: - PRN pain medications   - See additional Care Plan goals for specific interventions  Outcome: Progressing

## 2025-04-15 NOTE — CM/SW NOTE
Chart reviewed for discharge planning needs. PT anticipates HH at discharge, however, pt/family declining HH at this time.     Page KNOX, LSW  Discharge Planner

## 2025-04-15 NOTE — DIETARY NOTE
[facility]   part of [facility]   CLINICAL NUTRITION    Jared Nicole     Admitting diagnosis:  Common bile duct dilatation [K83.8]    Ht: 172.7 cm (5' 8\")  Wt: 84.4 kg (186 lb 1.1 oz).   Body mass index is 28.29 kg/m².  IBW: 70 kg    Wt Readings from Last 6 Encounters:   04/15/25 84.4 kg (186 lb 1.1 oz)   11/18/24 89.4 kg (197 lb)   05/02/24 89.4 kg (197 lb)   11/02/23 86.6 kg (191 lb)   05/02/23 87.1 kg (192 lb)   11/02/22 86.2 kg (190 lb)        Pmhx - includes Afib, BPH, CHF, HLD    Labs/Meds reviewed    Diet:       Procedures    Regular/General diet Is Patient on Accuchecks? No; Misc Restriction: Low Fiber/Soft     Percent Meals Eaten (last 3 days)       Date/Time Percent Meals Eaten (%)    04/12/25 0945 100 %    04/13/25 0757 75 %    04/13/25 1203 100 %    04/14/25 0857 100 %    04/14/25 1233 100 %    04/14/25 1730 100 %    04/15/25 0811 100 %    04/15/25 1251 50 %               80 yo male.  Admitted on 4/8/25 with common bile duct dilation Pt chart reviewed d/t LOS. 4/9 ERCP  with biliary stent placement.  4/11 cholecystectomy. Visited patient in room, wife  at bedside. Pt reports appetite is improving but not yet to baseline. Offered ONS to supplement current intake, patient declined. Per documented PO intake, patient taking 100% or food offered with most meals.     Patient is at low nutrition risk at this time. Please consult if patient status changes or nutrition issues arise.    Dora Liz RDN, LDN, [facility]  Clinical Dietitian   [id number]

## 2025-04-15 NOTE — PHYSICAL THERAPY NOTE
PHYSICAL THERAPY EVALUATION - INPATIENT     Room Number: 8621/8621-A  Evaluation Date: 4/15/2025  Type of Evaluation: Initial  Physician Order: PT Eval and Treat    Presenting Problem: Common bile duct dilation  Co-Morbidities : A-fib on Coumadin, BPH, HTN, COPD  Reason for Therapy: Mobility Dysfunction and Discharge Planning    PHYSICAL THERAPY ASSESSMENT   Patient is a 79 year old male admitted 4/8/2025 for common bile duct dilation s/p ERCP with biliary stent placement on 4/9/25 and percutaneous cholecystostomy catheter on 4/11/25.  Prior to admission, patient's baseline is independent.  Patient is currently functioning below baseline with bed mobility, transfers, gait, stair negotiation, and standing prolonged periods.  Patient is requiring stand-by assist and contact guard assist as a result of the following impairments: decreased endurance/aerobic capacity, impaired standing balance, decreased muscular endurance, and medical status.  Physical therapy will continue to follow for duration of hospitalization.    Patient will benefit from continued skilled PT Services at discharge to promote prior level of function and safety with additional support and return home with home health PT.  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    PLAN DURING HOSPITALIZATION  Nursing Mobility Recommendation : 1 Assist  PT Device Recommendation: Rolling walker  PT Treatment Plan: Endurance, Patient education, Family education, Gait training, Neuromuscular re-educate, Strengthening, Stair training, Transfer training, Balance training  Rehab Potential : Good  Frequency (Obs): 3-5x/week     CURRENT GOALS    Goal #1 Patient is able to demonstrate supine - sit EOB @ level: modified independent     Goal #2 Patient is able to demonstrate transfers Sit to/from Stand at assistance level: modified independent     Goal #3 Patient is able to  ambulate 200 feet with assist device: walker - rolling at assistance level: modified independent     Goal #4 The pt will ascend/descend 2 stairs with use of a handrail with SBA.   Goal #5    Goal #6    Goal Comments: Goals established on 4/15/2025      PHYSICAL THERAPY MEDICAL/SOCIAL HISTORY  History related to current admission: Patient is a 79 year old male admitted on 2025 from home for sudden onset of RUQ abdominal pain.  Pt diagnosed with common bile duct dilation s/p ERCP with biliary stent placement on 25 and percutaneous cholecystostomy catheter on 25.     HOME SITUATION  Type of Home: House  Home Layout: Two level  Stairs to Enter : 2   Railing: Yes    Stairs to Bedroom: 0         Lives With: Spouse, Roommate    Drives: Yes   Patient Regularly Uses:  (Pt owns: walk in shower, shower chair, raised toilet, cane)      Prior Level of Summers: The pt is typically independent with ambulation and I/ADL's.  Pt denies any falls.    SUBJECTIVE  Pt reports he is eager to discharge home.    OBJECTIVE  Precautions: Drain(s)  Fall Risk: High fall risk    WEIGHT BEARING RESTRICTION     PAIN ASSESSMENT  Ratin          COGNITION  Overall Cognitive Status:  WFL - within functional limits    RANGE OF MOTION AND STRENGTH ASSESSMENT  Upper extremity ROM and strength are within functional limits     Lower extremity ROM is within functional limits     Lower extremity strength is within functional limits     BALANCE  Static Sitting: Good  Dynamic Sitting: Good  Static Standing: Fair -  Dynamic Standing: Fair -        ACTIVITY TOLERANCE  Pulse: 97  Heart Rate Source: Monitor                   O2 WALK  Oxygen Therapy  SPO2% on Room Air at Rest: 95      AM-PAC '6-Clicks' INPATIENT SHORT FORM - BASIC MOBILITY  How much difficulty does the patient currently have...  Patient Difficulty: Turning over in bed (including adjusting bedclothes, sheets and blankets)?: None   Patient Difficulty: Sitting down on and  standing up from a chair with arms (e.g., wheelchair, bedside commode, etc.): A Little   Patient Difficulty: Moving from lying on back to sitting on the side of the bed?: A Little   How much help from another person does the patient currently need...   Help from Another: Moving to and from a bed to a chair (including a wheelchair)?: A Little   Help from Another: Need to walk in hospital room?: A Little   Help from Another: Climbing 3-5 steps with a railing?: A Little     AM-PAC Score:  Raw Score: 19   Approx Degree of Impairment: 41.77%   Standardized Score (AM-PAC Scale): 45.44   CMS Modifier (G-Code): CK    FUNCTIONAL ABILITY STATUS  Gait Assessment   Functional Mobility/Gait Assessment  Gait Assistance: Supervision  Distance (ft): 300  Assistive Device: Rolling walker  Pattern:  (step through gait pattern)  Stairs: Stairs  How Many Stairs: 2  Device: 1 Rail  Assist: Contact guard assist  Pattern: Ascend and Descend  Ascend and Descend : Step to    Skilled Therapy Provided     Bed Mobility:  Rolling: SBA  Supine to sit: SBA   Sit to supine: NT     Transfer Mobility:  Sit to stand: SBA  Stand to sit: SBA  Gait = CGA with increased sway sans device, SBA with use of a RW  Stairs: CGA    Therapist's Comments: Pt ambulated 25 feet without use of device, no overt imbalance, but mild decrease in gait speed and increased sway.  Pt exhibits improved stability with the use of the RW, pt agreed he was more stable with the use of RW.  RW fit and issued for home.    Exercise/Education Provided:  Gait training  Strengthening  Transfer training    Patient End of Session: Up in chair, Needs met, Call light within reach, RN aware of session/findings, All patient questions and concerns addressed, Hospital anti-slip socks, Alarm set, Family present      Patient Evaluation Complexity Level:  History High - 3 or more personal factors and/or co-morbidities   Examination of body systems Moderate - addressing a total of 3 or more  elements   Clinical Presentation Low- Stable   Clinical Decision Making Low Complexity       PT Session Time: 30 minutes  Gait Training: 10 minutes

## 2025-04-15 NOTE — DISCHARGE SUMMARY
[place] HOSPITALIST  DISCHARGE SUMMARY     Jared Nicole Patient Status:  Inpatient    1945 MRN [id number]   Location 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  Sharp pain intermittent.  Patient denies any nausea or vomiting or diarrhea.  No fevers, chills.  No chest pain or shortness of breath or palpitations.  No hematochezia, melena, hematuria.  Patient denies looking jaundiced.     Brief Synopsis:     #Biliary colic  #Dilated CBD with filling defect   #Elevated LFTs  -S/p ERCP  with biliary stent placement   -Monitor LFTs - improving   -General surgery following - s/p cholecystostomy   -Culture from cholecystostomy growing enterococcus and E. Coli - antibiotics per ID     #Acute diastolic heart failure  #Acute pulmonary edema  #Mod MR/TR  #Pulmonary HTN  -Echo reviewed  -Diuresis per cardiology   -Appears euvolemic      #Chronic afib with RVR  -Cardizem gtt weaned, PO cardizem and BB  -Coumadin reversed with vitamin K and FFP  -Coumadin now resumed      #BPH  -Flomax      #CKDIII  -At baseline      #DL  -Statin on hold due to elevated LFTs - can resume at DC    #Disposition  -Stable for DC home     Lace+ Score: 70  59-90 High Risk  29-58 Medium Risk  0-28   Low Risk  Patient was referred to the [facility].    TCM Follow-Up Recommendation:   LACE > 58: High Risk of readmission after discharge from the hospital.      Procedures during hospitalization:   Endoscopic Retrograde Cholangiopancreatoscopy with biliary stent placement   Percutaneous cholecystostomy catheter placement     Incidental or significant findings and recommendations (brief descriptions):  None    Lab/Test results pending at Discharge:   None    Consultants:  General surgery  IR  Cardiology  ID    Discharge Medication List:     Discharge Medications        START taking these medications        Instructions Prescription details   cephALEXin 500 MG Caps  Commonly known as: Keflex      Take 1 capsule (500 mg total) by mouth 4 (four) times daily for 10 days.   Stop taking on: April 25, 2025  Quantity: 40 capsule  Refills: 0     metroNIDAZOLE 500 MG Tabs  Commonly known as: Flagyl      Take 1 tablet (500 mg total) by mouth in the morning and 1 tablet (500 mg total) before bedtime. Do all this for 10 days.   Stop taking on: April 25, 2025  Quantity: 20 tablet  Refills: 0            CHANGE how you take these medications        Instructions Prescription details   furosemide 40 MG Tabs  Commonly known as: Lasix  What changed:   medication strength  how much to take      Take 1 tablet (40 mg total) by mouth daily.   Stop taking on: May 15, 2025  Quantity: 30 tablet  Refills: 0     Metoprolol Tartrate 75 MG Tabs  What changed:   medication strength  how much to take  when to take this      Take 75 mg by mouth 2x Daily(Beta Blocker).   Quantity: 180 tablet  Refills: 1            CONTINUE taking these medications        Instructions Prescription details   ascorbic acid 500 MG Tabs  Commonly known as: Vitamin C      Take 1 tablet (500 mg total) by mouth daily.   Refills: 0     atorvastatin 10 MG Tabs  Commonly known as: Lipitor      TAKE 1 TABLET EVERY DAY   Quantity: 90 tablet  Refills: 3     dilTIAZem  MG Cp24  Commonly known as: CardIZEM CD      TAKE 1 CAPSULE EVERY DAY   Quantity: 90  capsule  Refills: 3     tamsulosin 0.4 MG Caps  Commonly known as: Flomax      TAKE 1 CAPSULE EVERY DAY   Quantity: 90 capsule  Refills: 3     Vitamin D 1000 units Caps      Take 1 Cap by mouth daily.   Refills: 0     warfarin 2.5 MG Tabs  Commonly known as: Coumadin      Take as directed. If you are unsure how to take this medication, talk to your nurse or doctor.  Original instructions: TAKE ONE TABLET BY MOUTH 2 DAYS WEEKLY OR AS DIRECTED BY ANTICOAGULATION CLINIC.   Quantity: 30 tablet  Refills: 3     warfarin 5 MG Tabs  Commonly known as: Coumadin      Take as directed. If you are unsure how to take this medication, talk to your nurse or doctor.  Original instructions: TAKE ONE TABLET BY MOUTH 5 DAYS WEEKLY OR AS DIRECTED BY ANTICOAGULATION CLINIC.   Quantity: 65 tablet  Refills: 3               Where to Get Your Medications        These medications were sent to [place] [id number] [place] [facility] [place], [place] - [address] [facility] [address] [phone], [phone] [address] [address] [facility] [address], [facility] [address]      Phone: [phone]   cephALEXin 500 MG Caps  furosemide 40 MG Tabs  Metoprolol Tartrate 75 MG Tabs  metroNIDAZOLE 500 MG Tabs         [place]: [place]    Follow-up appointment:   [facility]  [address] [facility] [address]  [facility] [address] [phone]  Schedule an appointment as soon as possible for a visit      Eric Neil MD  [address] [facility] [address]  [facility] [address]  [phone]    Schedule an appointment as soon as possible for a visit in 1 week(s)      Med Schmidt MD  [address]  [facility] [address]  [facility] [id number] [phone]    Follow up in 1 month(s)      Appointments for Next 30 Days 4/15/2025 - 5/15/2025      None          -----------------------------------------------------------------------------------------------  PATIENT DISCHARGE INSTRUCTIONS: See electronic chart    Trent Ghelani, DO    Total time spent on discharge plannin minutes     The   Cures Act makes medical notes like these available to patients in the interest of transparency. Please be advised this is a medical document. Medical documents are intended to carry relevant information, facts as evident, and the clinical opinion of the practitioner. The medical note is intended as peer to peer communication and may appear blunt or direct. It is written in medical language and may contain abbreviations or verbiage that are unfamiliar.

## 2025-04-15 NOTE — PROGRESS NOTES
Pharmacy Dosing Service: Warfarin (Coumadin)  Jared Nicole is a 79 year old male for whom pharmacy has been dosing warfarin (Coumadin). Goal INR is 2-3    Recent Labs   Lab 25  0839 25  1356 04/10/25  0523 25  1025 04/15/25  0503   INR 4.08* 1.81* 1.56* 1.24* 1.31*     Consulted by: Dr. Linn  Indication: A-fib  Potential Drug Interactions:  Diltiazem (can increase warfarin exposure through CY inhibition), Zosyn (can increase bleeding risk)  Other Anticoagulants:  none  Home regimen (if applicable):     5 mg   Monday 5 mg   Tuesday 5 mg   Wednesday 5 mg   Thursday 2.5 mg   Friday 5 mg   Saturday 2.5 mg   (Per anticoag clinc note from 3/24/25)    Inpatient Dosing History  Date 4/14 4/15       INR -- 1.31       Coumadin dose 7.5 mg          Based on above -  1.  For today, Give warfarin (COUMADIN) 5 mg at 2100 tonight, LFTs now WNL but some DDIs so cautiously decreasing Coumadin dose tonight to 5mg to evaluate trend tomorrow  2   PT/INR ordered daily while on coumadin  3.  Pharmacy will continue to follow.  We appreciate the opportunity to assist in his care.    Alisa Dang, PharmD  4/15/2025  11:47 AM

## 2025-04-15 NOTE — PROGRESS NOTES
[facility]   part of [facility]     Hospitalist Progress Note     Jared Nicole Patient Status:  Inpatient    1945 MRN [id number]   Location [facility] 3NW-A Attending Trent Linn,    Hosp Day # 7 PCP Eric Neil MD     Chief Complaint: Abdominal pain    Subjective:     Patient seen and examined. No acute complaints overnight or this morning.     Objective:    Review of Systems:   A comprehensive review of systems was completed; pertinent positive and negatives stated in subjective.    Vital signs:  Temp:  [97.3 °F (36.3 °C)-98.5 °F (36.9 °C)] 98.3 °F (36.8 °C)  Pulse:  [] 97  Resp:  [14-18] 18  BP: (104-119)/(65-82) 113/74  SpO2:  [90 %-97 %] 96 %    Physical Exam:    General: No acute distress  Respiratory: No wheezes, no rhonchi  Cardiovascular: Irregularly irregular.   Abdomen: Soft, mild RUQ tenderness, non-distended, positive bowel sounds  Neuro: No new focal deficits.   Extremities: No edema    Diagnostic Data:    Labs:  Recent Labs   Lab 25  0540 25  0839 25  1356 04/10/25  0523 04/10/25  0809 25  0602 25  1025 04/15/25  0503   WBC 15.6*  --   --   --  14.0* 11.5*  --   --    HGB 14.0  --   --   --  13.9 13.4  --   --    MCV 91.6  --   --   --  91.5 90.9  --   --    .0  --   --   --  151.0 150.0  --   --    INR  --  4.08* 1.81* 1.56*  --   --  1.24* 1.31*       Recent Labs   Lab 25  0903 25  0428 25  1524 25  0519 25  1532   * 106*  --  123*  --    BUN 20 20  --  21  --    CREATSERUM 1.25 1.14  --  1.23  --    CA 9.4 8.8  --  9.3  --    ALB 4.2 3.7  --  4.2  --     139  --  139  --    K 3.8 3.0* 3.6 3.6 4.1    102  --  103  --    CO2 28.0 27.0  --  24.0  --    ALKPHO 127* 109  --  113  --    AST 42* 29  --  29  --    ALT 68* 50*  --  45  --    BILT 3.2* 2.8*  --  2.2*  --    TP 7.0 6.4  --  7.1  --        Estimated Glomerular Filtration Rate: 59 mL/min/1.73m2 (A) (result from  lab).    No results for input(s): \"TROP\", \"TROPHS\", \"CK\" in the last 168 hours.      Recent Labs   Lab 04/10/25  0523 04/11/25  1025 04/15/25  0503   PTP 18.8* 15.7* 16.4*   INR 1.56* 1.24* 1.31*              Microbiology    Hospital Encounter on 04/08/25   1. Body Fluid Cult Aerobic and Anaerobic     Status: Abnormal (Preliminary result)    Collection Time: 04/11/25  7:25 PM    Specimen: Abdominal fluid; Body fluid, unspecified   Result Value Ref Range    Body Fluid Culture Result Escherichia coli (A) N/A    Body Fluid Culture Result Enterococcus faecalis (A) N/A    Body Fluid Smear Gram stain of positive bottle shows: (A) N/A    Body Fluid Smear Gram Positive Cocci (A) N/A    Body Fluid Smear Gram Negative Rods (A) N/A       Susceptibility    Escherichia coli -  (no method available)     Ampicillin 16 Intermediate      Cefazolin <=4 Sensitive      Ciprofloxacin >=4 Resistant      Gentamicin <=1 Sensitive      Meropenem <=0.25 Sensitive      Levofloxacin >=8 Resistant      Piperacillin + Tazobactam <=4 Sensitive      Trimethoprim/Sulfa <=20 Sensitive     Enterococcus faecalis -  (no method available)     Ampicillin <=2 Sensitive      Vancomycin 2 Sensitive          Imaging: Reviewed in Epic.    Medications: Scheduled Medications[1]    Assessment & Plan:      #Biliary colic  #Dilated CBD with filling defect   #Elevated LFTs  -S/p ERCP 4/9 with biliary stent placement   -Monitor LFTs - improving   -General surgery following - s/p cholecystostomy 4/11  -Culture from cholecystostomy growing enterococcus and E. Coli - ID consulted     #Acute diastolic heart failure  #Acute pulmonary edema  #Mod MR/TR  #Pulmonary HTN  -Echo reviewed  -Diuresis per cardiology     #Chronic afib with RVR  -Cardizem gtt being weaned, PO cardizem started  -Coumadin reversed with vitamin K and FFP  -Coumadin resumed     #BPH  -Flomax     #CKDIII  -At baseline     #DL  -Statin on hold due to elevated LFTs    #Disposition  -DC planning    Trent  DO Temitope    Supplementary Documentation:     Quality:  DVT Mechanical Prophylaxis:   SCDs,    DVT Pharmacologic Prophylaxis   Medication    warfarin (Coumadin) tab 5 mg                Code Status: Prior  Echols: No urinary catheter in place  Echols Duration (in days):   Central line:    DAT: 4/15/2025    Discharge is dependent on: clinical progress  At this point Mr. Nicole is expected to be discharge to: home    The 21st Century Cures Act makes medical notes like these available to patients in the interest of transparency. Please be advised this is a medical document. Medical documents are intended to carry relevant information, facts as evident, and the clinical opinion of the practitioner. The medical note is intended as peer to peer communication and may appear blunt or direct. It is written in medical language and may contain abbreviations or verbiage that are unfamiliar.                         [1]    warfarin  5 mg Oral Once at night    pantoprazole  40 mg Oral QAM AC    dilTIAZem ER  120 mg Oral Daily    furosemide  40 mg Oral BID (Diuretic)    piperacillin-tazobactam  3.375 g Intravenous Q8H    metoprolol tartrate  75 mg Oral 2x Daily(Beta Blocker)    tamsulosin  0.4 mg Oral Daily

## 2025-04-15 NOTE — PLAN OF CARE
Discharged  home via wheelchair  Accompanied by Support staff   Belongings taken by patient/family  PIV removed  Tele box removed & placed in the drawer  Discharge Navigator completed  Discharge instructions reviewed with patient a bedside  All questions & concerns addressed at his time.       Problem: RISK FOR INFECTION - ADULT  Goal: Absence of fever/infection during anticipated neutropenic period  Description: INTERVENTIONS- Monitor WBC- Administer growth factors as ordered- Implement neutropenic guidelines  Outcome: Progressing     Problem: SAFETY ADULT - FALL  Goal: Free from fall injury  Description: INTERVENTIONS:- Assess pt frequently for physical needs- Identify cognitive and physical deficits and behaviors that affect risk of falls.- [place] fall precautions as indicated by assessment.- Educate pt/family on patient safety including physical limitations- Instruct pt to call for assistance with activity based on assessment- Modify environment to reduce risk of injury- Provide assistive devices as appropriate- Consider OT/PT consult to assist with strengthening/mobility- Encourage toileting schedule  Outcome: Progressing     Problem: Patient/Family Goals  Goal: Patient/Family Long Term Goal  Description: Patient's Long Term Goal: Discharge home   Interventions:  - Pain management   -IVF   - IV abx   - See additional Care Plan goals for specific interventions  Outcome: Progressing  Goal: Patient/Family Short Term Goal  Description: Patient's Short Term Goal: Pain management   Interventions: - PRN pain medications   - See additional Care Plan goals for specific interventions  Outcome: Progressing

## 2025-04-16 ENCOUNTER — PATIENT OUTREACH (OUTPATIENT)
Dept: CASE MANAGEMENT | Age: 80
End: 2025-04-16

## 2025-04-16 NOTE — PAYOR COMM NOTE
--------------  DISCHARGE REVIEW    Payor: [facility] MEDICARE ADV PPO  Subscriber #:  [id number]  Authorization Number: [id number]    Admit date: 25  Admit time:   4:28 PM  Discharge Date: 4/15/2025  5:44 PM     Admitting Physician: Raymond Saavedra MD  Attending Physician:  Mary Lou Dunn DO  Primary Care Physician: Eric Neil MD          Discharge Summary Notes        Discharge Summary signed by Trent Linn DO at 4/15/2025  3:43 PM       Author: Trent Linn DO Specialty: HOSPITALIST Author Type: Physician    Filed: 4/15/2025  3:43 PM Date of Service: 4/15/2025  3:39 PM Status: Signed    : Trent Linn DO (Physician)           [facility]     Jared Nicole Patient Status:  Inpatient    1945 MRN [id number]   Location [address] Attending Trent Linn DO   Hosp Day # 7 PCP Eric Neil MD     Date of Admission: 2025  Date of Discharge:   4/15/2025    Discharge Disposition: Home or Self Care    Discharge Diagnosis:  Biliary colic/acute cholecystitis   Dilated CBD with filling defect  Acute diastolic heart failure  Acute cardiogenic pulmonary edema  Moderate MR/TR  Pulmonary HTN  Chronic afib with RVR  BPH  CKDIII  Dyslipidemia     History of Present Illness: Jared Nicole is a 79 year old male with atrial fibrillation, BPH, chronic heart failure preserved ejection fraction, hyperlipidemia presents to the emergency room with right upper quadrant pain that came on suddenly.  Sharp pain intermittent.  Patient denies any nausea or vomiting or diarrhea.  No fevers, chills.  No chest pain or shortness of breath or palpitations.  No hematochezia, melena, hematuria.  Patient denies looking jaundiced.     Brief Synopsis:     #Biliary colic  #Dilated CBD with filling defect   #Elevated LFTs  -S/p ERCP  with biliary stent placement   -Monitor LFTs - improving   -General surgery following - s/p cholecystostomy   -Culture from cholecystostomy  growing enterococcus and E. Coli - antibiotics per ID     #Acute diastolic heart failure  #Acute pulmonary edema  #Mod MR/TR  #Pulmonary HTN  -Echo reviewed  -Diuresis per cardiology   -Appears euvolemic      #Chronic afib with RVR  -Cardizem gtt weaned, PO cardizem and BB  -Coumadin reversed with vitamin K and FFP  -Coumadin now resumed      #BPH  -Flomax      #CKDIII  -At baseline      #DL  -Statin on hold due to elevated LFTs - can resume at DC    #Disposition  -Stable for DC home     Lace+ Score: 70  59-90 High Risk  29-58 Medium Risk  0-28   Low Risk  Patient was referred to [facility].    TCM Follow-Up Recommendation:  LACE > 58: High Risk of readmission after discharge from the hospital.      Procedures during hospitalization:   Endoscopic Retrograde Cholangiopancreatoscopy with biliary stent placement   Percutaneous cholecystostomy catheter placement     Incidental or significant findings and recommendations (brief descriptions):  None    Lab/Test results pending at Discharge:   None    Consultants:  General surgery  IR  Cardiology  ID    Discharge Medication List:     Discharge Medications        START taking these medications        Instructions Prescription details   cephALEXin 500 MG Caps  Commonly known as: Keflex      Take 1 capsule (500 mg total) by mouth 4 (four) times daily for 10 days.   Stop taking on: April 25, 2025  Quantity: 40 capsule  Refills: 0     metroNIDAZOLE 500 MG Tabs  Commonly known as: Flagyl      Take 1 tablet (500 mg total) by mouth in the morning and 1 tablet (500 mg total) before bedtime. Do all this for 10 days.   Stop taking on: April 25, 2025  Quantity: 20 tablet  Refills: 0            CHANGE how you take these medications        Instructions Prescription details   furosemide 40 MG Tabs  Commonly known as: Lasix  What changed:   medication strength  how much to take      Take 1 tablet (40 mg total) by mouth daily.   Stop taking on: May 15,  2025  Quantity: 30 tablet  Refills: 0     Metoprolol Tartrate 75 MG Tabs  What changed:   medication strength  how much to take  when to take this      Take 75 mg by mouth 2x Daily(Beta Blocker).   Quantity: 180 tablet  Refills: 1            CONTINUE taking these medications        Instructions Prescription details   ascorbic acid 500 MG Tabs  Commonly known as: Vitamin C      Take 1 tablet (500 mg total) by mouth daily.   Refills: 0     atorvastatin 10 MG Tabs  Commonly known as: Lipitor      TAKE 1 TABLET EVERY DAY   Quantity: 90 tablet  Refills: 3     dilTIAZem  MG Cp24  Commonly known as: CardIZEM CD      TAKE 1 CAPSULE EVERY DAY   Quantity: 90 capsule  Refills: 3     tamsulosin 0.4 MG Caps  Commonly known as: Flomax      TAKE 1 CAPSULE EVERY DAY   Quantity: 90 capsule  Refills: 3     Vitamin D 1000 units Caps      Take 1 Cap by mouth daily.   Refills: 0     warfarin 2.5 MG Tabs  Commonly known as: Coumadin      Take as directed. If you are unsure how to take this medication, talk to your nurse or doctor.  Original instructions: TAKE ONE TABLET BY MOUTH 2 DAYS WEEKLY OR AS DIRECTED BY ANTICOAGULATION CLINIC.   Quantity: 30 tablet  Refills: 3     warfarin 5 MG Tabs  Commonly known as: Coumadin      Take as directed. If you are unsure how to take this medication, talk to your nurse or doctor.  Original instructions: TAKE ONE TABLET BY MOUTH 5 DAYS WEEKLY OR AS DIRECTED BY ANTICOAGULATION CLINIC.   Quantity: 65 tablet  Refills: 3               Where to Get Your Medications        These medications were sent to [facility] [id number] [place], [place] - [address] [facility] [address] [phone], [phone]  [address] [address] [address]      Phone: [phone]   cephALEXin 500 MG Caps  furosemide 40 MG Tabs  Metoprolol Tartrate 75 MG Tabs  metroNIDAZOLE 500 MG Tabs         [place]    Follow-up appointment:   [facility]  [address]  [address]  [phone]  Schedule an appointment as soon as possible for a visit      Eric Neil MD  [address]  [address]  [facility] [address]  [phone]    Schedule an appointment as soon as possible for a visit in 1 week(s)      Med Schmidt MD  [address] [facility]  [address]  [facility] [address]  [phone]    Follow up in 1 month(s)      Appointments for Next 30 Days 4/15/2025 - 5/15/2025      None          -----------------------------------------------------------------------------------------------  PATIENT DISCHARGE INSTRUCTIONS: See electronic chart    Trent Linn DO    Total time spent on discharge plannin minutes     The  [employer] Cures Act makes medical notes like these available to patients in the interest of transparency. Please be advised this is a medical document. Medical documents are intended to carry relevant information, facts as evident, and the clinical opinion of the practitioner. The medical note is intended as peer to peer communication and may appear blunt or direct. It is written in medical language and may contain abbreviations or verbiage that are unfamiliar.       Electronically signed by Trent Linn DO on 4/15/2025  3:43 PM         REVIEWER COMMENTS

## 2025-04-16 NOTE — PROGRESS NOTES
NCM attempted to reach the patient to complete a transitions of care call. Left message to call back. [facility] provided direct contact info at [phone].

## 2025-04-16 NOTE — PROGRESS NOTES
NCM attempted to reach the patient to complete a transitions of care call. I called home phone (which was listed as the primary preferred contact) however this phone number is incorrect as  states it is for a change to win a free [employer] gift card. I called the mobile phone number and  states it is Rich and to leave a message-  Left message to call back. [facility] provided direct contact info at [phone].    Per discharge instructions:   Perc heath tube 24hr output 160ml. Recommend flushing with 10 ml saline once daily to maintain tube patency. Continue gravity drainage. Tube must remain in place for at least 4-6 weeks to allow tract maturity. After that time tube injection study can be performed if removal is desired. Routine outpatient tube exchange is recommended every 8-12 weeks, if tube is still in place.         Follow-up Information    Follow up With Specialties Details Why Contact Info Additional Information   [facility] Internal Medicine Schedule an appointment as soon as possible for a visit  [address] [facility] [address]  [facility] [address] [phone] Masks are optional for all patients and visitors, unless otherwise indicated. Note: A $50 fee will be charged for missed appointments or same-day cancellations. Please provide 24 hours' notice if you need to cancel or reschedule your appointment.   Eric Neil MD Family Medicine Schedule an appointment as soon as possible for a visit in 1 week(s)  [address]  [address]  [facility] [address]  [phone]    Med Schmidt MD CARDIOLOGY Follow up in 1 month(s)  [address]  [facility] [address]  [facility] [address]  [phone]    Janes Dias MD SURGERY, GENERAL Follow up in 4 week(s)  [address]  [facility] [address] [phone]          Future Appointments   Date Time Provider Department Center   4/23/2025  8:00 AM Eric Neil MD [facility] [address]   5/19/2025  7:30 AM Eric Neil MD [facility] [address]    5/20/2025 10:15 AM Janes Dias MD [id number]

## 2025-04-17 NOTE — PROGRESS NOTES
Hospital follow up.    Eric Neil MD  Family Medicine  [facility]  [address] [place] [address]  [phone]  Wednesday 4/23 @8  Existing appointment.    Closing encounter.  
TCC appointment request (discharged 04/15)    [facility]  [address]   [address]  [place] [address]  [phone]  Patient has existing appointment with PCP, Wed 04/23 @8:00am    Attempt #1:  Left message on voicemail for patient to call transitions specialist back to schedule follow up appointments. Provided Transitions specialist scheduling phone number [phone].      
Clear

## 2025-04-18 NOTE — PROGRESS NOTES
NCM attempted to reach the patient to complete a transitions of care call. Left message to call back. [facility] provided direct contact info at [phone]. Unable to reach the patient after several attempts. [facility] closing encounter.

## 2025-04-23 ENCOUNTER — OFFICE VISIT (OUTPATIENT)
Dept: FAMILY MEDICINE CLINIC | Facility: CLINIC | Age: 80
End: 2025-04-23
Payer: MEDICARE

## 2025-04-23 VITALS
HEIGHT: 68 IN | OXYGEN SATURATION: 97 % | RESPIRATION RATE: 16 BRPM | WEIGHT: 188 LBS | DIASTOLIC BLOOD PRESSURE: 52 MMHG | TEMPERATURE: 98 F | SYSTOLIC BLOOD PRESSURE: 90 MMHG | HEART RATE: 62 BPM | BODY MASS INDEX: 28.49 KG/M2

## 2025-04-23 DIAGNOSIS — I50.31 ACUTE DIASTOLIC HEART FAILURE (HCC): ICD-10-CM

## 2025-04-23 DIAGNOSIS — K80.01 CALCULUS OF GALLBLADDER WITH ACUTE CHOLECYSTITIS AND OBSTRUCTION: Primary | ICD-10-CM

## 2025-04-23 PROCEDURE — 99495 TRANSJ CARE MGMT MOD F2F 14D: CPT | Performed by: FAMILY MEDICINE

## 2025-04-23 PROCEDURE — 1160F RVW MEDS BY RX/DR IN RCRD: CPT | Performed by: FAMILY MEDICINE

## 2025-04-23 PROCEDURE — 1159F MED LIST DOCD IN RCRD: CPT | Performed by: FAMILY MEDICINE

## 2025-04-23 PROCEDURE — 1111F DSCHRG MED/CURRENT MED MERGE: CPT | Performed by: FAMILY MEDICINE

## 2025-04-23 PROCEDURE — 3008F BODY MASS INDEX DOCD: CPT | Performed by: FAMILY MEDICINE

## 2025-04-23 PROCEDURE — 3074F SYST BP LT 130 MM HG: CPT | Performed by: FAMILY MEDICINE

## 2025-04-23 PROCEDURE — 3078F DIAST BP <80 MM HG: CPT | Performed by: FAMILY MEDICINE

## 2025-04-23 NOTE — PROGRESS NOTES
Subjective:   Jared Nicole is a 79 year old male who presents for hospital follow up.   He was discharged from [facility] to Home or Self Care  Admission Date: 4/8/25   Discharge Date: 4/15/25  Hospital Discharge Diagnosis:   Discharge Diagnosis:  Biliary colic/acute cholecystitis   Dilated CBD with filling defect  Acute diastolic heart failure  Acute cardiogenic pulmonary edema  Moderate MR/TR  Pulmonary HTN  Chronic afib with RVR  BPH  CKDIII  Dyslipidemia   Interactive contact within 2 business days post discharge first initiated on Date: 4/16/2025    During the visit, the following was completed:  Obtained and reviewed discharge summary, continuity of care documents, and Hospitalist notes  Reviewed Labs (CBC, CMP)    HPI:   Mr. Nicole is a pleasant 79-year-old gentleman with history of hypertension, A. fib on anticoagulation with warfarin, CHF, COPD, hyperlipidemia, prediabetes, BPH for hospital follow-up.  He is accompanied by his wife.  He was admitted at [facility] on 4/18/2025 and was discharged on 4/15/2025.    He presented with right upper quadrant pain.  Imaging was done which showed acute cholecystitis and dilated CBD with filling defect.  Gastroenterology and general surgery were consulted.  Antibiotics started per infectious disease specialty.  He had cholecystotomy done on 4/11.  Liver enzymes were initially elevated but seem to have trended down during hospital course.  He also had ERCP with biliary stent placement on 4/9.    However he had developed acute pulmonary edema secondary to acute diastolic heart failure.  Echocardiogram was done which showed moderate mitral regurgitation and tricuspid regurgitation with pulmonary hypertension.  Cardiology was subsequently consulted.  He had IV diuresis.  Patient also noted to have A-fib with RVR for which he was started on Cardizem drip and weaned and transition to oral Cardizem and beta-blocker.  Metoprolol dose was increased upon  discharge.      Currently he does not report fever, chest pain, nausea, vomiting, abdominal pain.  He feels tired and has diminished appetite and shortness of breath on exertion.  He is able to walk and ambulate.  He does have nonproductive cough.    He will be having ERCP next May.  He will be seeing cardiology and general surgery in the next few weeks.      I had reviewed past medical and family histories together with allergy and medication lists documented.      History/Other:   Current Medications:  Medication Reconciliation:  I am aware of an inpatient discharge within the last 30 days.  The discharge medication list has been reconciled with the patient's current medication list and reviewed by me. See medication list for additions of new medication, and changes to current doses of medications and discontinued medications.  Active Meds, Sig Only[1]    Review of Systems   Constitutional:  Positive for appetite change. Negative for fatigue, fever and unexpected weight change.   HENT:  Negative for sore throat and trouble swallowing.    Respiratory:  Positive for cough and shortness of breath.    Cardiovascular:  Negative for chest pain, palpitations and leg swelling.   Gastrointestinal:  Negative for abdominal pain, diarrhea, nausea and vomiting.   Neurological:  Negative for dizziness, weakness and headaches.         Objective:   Physical Exam  Vitals reviewed.   Constitutional:       General: He is not in acute distress.  HENT:      Mouth/Throat:      Mouth: Mucous membranes are moist.      Pharynx: Oropharynx is clear.   Eyes:      General: No scleral icterus.        Right eye: No discharge.         Left eye: No discharge.      Conjunctiva/sclera: Conjunctivae normal.   Cardiovascular:      Rate and Rhythm: Normal rate and regular rhythm.      Heart sounds: Normal heart sounds. No murmur heard.  Pulmonary:      Effort: Pulmonary effort is normal. No respiratory distress.      Breath sounds: Normal breath  sounds. No wheezing or rales.   Abdominal:      General: Bowel sounds are normal. There is no distension.      Palpations: Abdomen is soft. There is no mass.      Tenderness: There is no abdominal tenderness. There is no guarding.      Comments: Cholecystotomy drain in place on the right upper quadrant with no noted erythema no warmth no swelling no tenderness.  Cholecystostomy bag in place with mild yellowish output with no sediments nor blood noted.   Musculoskeletal:      Cervical back: Neck supple.      Right lower leg: No edema.      Left lower leg: No edema.   Lymphadenopathy:      Cervical: No cervical adenopathy.   Skin:     General: Skin is warm.   Neurological:      Mental Status: He is alert.   Psychiatric:         Mood and Affect: Mood normal.         Behavior: Behavior normal.         Thought Content: Thought content normal.         BP 90/52   Pulse 62   Temp 97.8 °F (36.6 °C) (Temporal)   Resp 16   Ht 5' 8\" (1.727 m)   Wt 188 lb (85.3 kg)   SpO2 97%   BMI 28.59 kg/m²  Estimated body mass index is 28.59 kg/m² as calculated from the following:    Height as of this encounter: 5' 8\" (1.727 m).    Weight as of this encounter: 188 lb (85.3 kg).    Assessment & Plan:   1. Calculus of gallbladder with acute cholecystitis and obstruction (Primary)  - Status post cholecystotomy and status post biliary stent  - Stable  - Continue cephalexin and metronidazole as managed by infectious disease  - Watch for fever, progressive weakness, change in mental status, vomiting, abdominal pain  - Go to the emergency room if this would be the case  - Await recommendations from surgery and gastroenterology  2. Acute diastolic heart failure (HCC)  -Stable  - Management per cardiology  - Go to the emergency room if with shortness of breath and chest pain    Follow-up as scheduled or as needed    This note was prepared using Dragon Medical voice recognition dictation software. As a result errors may occur. When identified  these errors have been corrected. While every attempt is made to correct errors during dictation discrepancies may still exist              Return if symptoms worsen or fail to improve.          [1]   Outpatient Medications Marked as Taking for the 4/23/25 encounter (Office Visit) with Eric Neil MD   Medication Sig    cephALEXin 500 MG Oral Cap Take 1 capsule (500 mg total) by mouth 4 (four) times daily for 10 days.    metroNIDAZOLE 500 MG Oral Tab Take 1 tablet (500 mg total) by mouth in the morning and 1 tablet (500 mg total) before bedtime. Do all this for 10 days.    Metoprolol Tartrate 75 MG Oral Tab Take 75 mg by mouth 2x Daily(Beta Blocker).    furosemide 40 MG Oral Tab Take 1 tablet (40 mg total) by mouth daily.    DILTIAZEM  MG Oral Capsule SR 24 Hr TAKE 1 CAPSULE EVERY DAY    ATORVASTATIN 10 MG Oral Tab TAKE 1 TABLET EVERY DAY    TAMSULOSIN 0.4 MG Oral Cap TAKE 1 CAPSULE EVERY DAY    warfarin 2.5 MG Oral Tab TAKE ONE TABLET BY MOUTH 2 DAYS WEEKLY OR AS DIRECTED BY ANTICOAGULATION CLINIC.    warfarin 5 MG Oral Tab TAKE ONE TABLET BY MOUTH 5 DAYS WEEKLY OR AS DIRECTED BY ANTICOAGULATION CLINIC.    ascorbic acid (VITAMIN C) 500 MG Oral Tab Take 1 tablet (500 mg total) by mouth daily.    Cholecalciferol (VITAMIN D) 1000 UNITS Oral Cap Take 1 Cap by mouth daily.

## 2025-04-23 NOTE — PROGRESS NOTES
Provider Clarification    Additional information on the patient's respiratory status     Hypoxia only, with respiratory failure ruled out     This note is part of the patient's medical record.

## 2025-04-23 NOTE — PATIENT INSTRUCTIONS
Thank you for choosing Eric Neil MD at [facility]  To Do: Jared DE ANDA Corey  1. Please see below   Call [phone] to schedule the appointment.   Please signup for Frontier[employer], which is electronic access to your record if you have not done so.  All your results will post on there.  [email or web address]   You can NOW use Frontierre to book your appointments with us, or consider using open access scheduling which is through the [place] website [email or web address] and type in Eric Neil MD and follow the links for \"Schedule Online Now\"    To schedule Imaging or tests at [place] call Central Scheduling [phone], Go to [facility] ER Building (For example: CT scans, X rays, Ultrasound, MRI)  Cardiac Testing in ER building Building A second floor Cardiac Testing [phone] (For example: Holter Monitor, Cardiac Stress tests,Event Monitor, or 2D Echocardiograms)  [facility] call [phone] usually in [facility]  Walk in Clinic in Campus at [address] Mon-Fri at 8am-7:30 p.m., and Sat/Sun 9:00a.m.-4:30 p.m.  Also at [address] [place] [address]  Call [phone] for info     Please call our office about any questions regarding your treatment/medicines/tests as a result of today's visit.  For your safety, read the entire package insert of all medicines prescribed to you and be aware of all of the risks of treatment even beyond those discussed today.  All therapies have potential risk of harm or side effects or medication interactions.  It is your duty and for your safety to discuss with the pharmacist and our office with questions, and to notify us and stop treatment if problems arise, but know that our intention is that the benefits outweigh those potential risks and we strive to make you healthier and to improve your quality of life.    Referrals, and Radiology Information:    If your insurance requires a referral to a specialist, please allow 5 business days to process  your referral request.    If Eric Neil MD orders a CT or MRI, it may take up to 10 business days to receive approval from your insurance company. Once our office has called informing you that the insurance company approved your testing, please call Central Scheduling at [phone]  Please allow our office 5 business days to contact you regarding any testing results.    Refill policies:   Allow 3 business days for refills; controlled substances may take longer and must be picked up from the office in person.  Narcotic medications can only be filled in 30 day increments and must be refilled at an office visit only.  If your prescription is due for a refill, you may be due for a follow-up appointment.  We cannot refill your maintenance medications at a preventative wellness visit.  To best provide you care, patients receiving maintenance medications need to be seen at least twice a year.     Detail Level: Detailed

## 2025-05-06 ENCOUNTER — HOSPITAL ENCOUNTER (OUTPATIENT)
Facility: HOSPITAL | Age: 80
Setting detail: HOSPITAL OUTPATIENT SURGERY
Discharge: HOME | End: 2025-05-06
Attending: INTERNAL MEDICINE | Admitting: INTERNAL MEDICINE
Payer: MEDICARE

## 2025-05-06 ENCOUNTER — ANESTHESIA EVENT (OUTPATIENT)
Dept: ENDOSCOPY | Facility: HOSPITAL | Age: 80
End: 2025-05-06
Payer: MEDICARE

## 2025-05-06 ENCOUNTER — APPOINTMENT (OUTPATIENT)
Dept: GENERAL RADIOLOGY | Facility: HOSPITAL | Age: 80
End: 2025-05-06
Attending: INTERNAL MEDICINE
Payer: MEDICARE

## 2025-05-06 ENCOUNTER — ANESTHESIA (OUTPATIENT)
Dept: ENDOSCOPY | Facility: HOSPITAL | Age: 80
End: 2025-05-06
Payer: MEDICARE

## 2025-05-06 VITALS
HEIGHT: 68 IN | BODY MASS INDEX: 28.64 KG/M2 | WEIGHT: 189 LBS | DIASTOLIC BLOOD PRESSURE: 68 MMHG | HEART RATE: 80 BPM | SYSTOLIC BLOOD PRESSURE: 112 MMHG | RESPIRATION RATE: 22 BRPM | TEMPERATURE: 97 F | OXYGEN SATURATION: 95 %

## 2025-05-06 LAB — INR: 1.2 (ref 0.8–1.3)

## 2025-05-06 PROCEDURE — 74328 X-RAY BILE DUCT ENDOSCOPY: CPT | Performed by: INTERNAL MEDICINE

## 2025-05-06 RX ORDER — NALOXONE HYDROCHLORIDE 0.4 MG/ML
0.08 INJECTION, SOLUTION INTRAMUSCULAR; INTRAVENOUS; SUBCUTANEOUS ONCE AS NEEDED
Status: DISCONTINUED | OUTPATIENT
Start: 2025-05-06 | End: 2025-05-06

## 2025-05-06 RX ORDER — SODIUM CHLORIDE, SODIUM LACTATE, POTASSIUM CHLORIDE, CALCIUM CHLORIDE 600; 310; 30; 20 MG/100ML; MG/100ML; MG/100ML; MG/100ML
INJECTION, SOLUTION INTRAVENOUS CONTINUOUS
Status: DISCONTINUED | OUTPATIENT
Start: 2025-05-06 | End: 2025-05-06

## 2025-05-06 RX ORDER — AMPICILLIN SODIUM AND SULBACTAM SODIUM 2; 1 G/1; G/1
INJECTION, POWDER, FOR SOLUTION INTRAVENOUS
Status: DISCONTINUED
Start: 2025-05-06 | End: 2025-05-06

## 2025-05-06 RX ORDER — LIDOCAINE HYDROCHLORIDE 10 MG/ML
INJECTION, SOLUTION EPIDURAL; INFILTRATION; INTRACAUDAL; PERINEURAL AS NEEDED
Status: DISCONTINUED | OUTPATIENT
Start: 2025-05-06 | End: 2025-05-06 | Stop reason: SURG

## 2025-05-06 RX ORDER — 0.9 % SODIUM CHLORIDE 0.9 %
INTRAVENOUS SOLUTION INTRAVENOUS
Status: DISCONTINUED
Start: 2025-05-06 | End: 2025-05-06

## 2025-05-06 RX ORDER — INDOMETHACIN 100 MG
100 SUPPOSITORY, RECTAL RECTAL ONCE
Status: DISCONTINUED | OUTPATIENT
Start: 2025-05-06 | End: 2025-05-06

## 2025-05-06 RX ORDER — SODIUM CHLORIDE, SODIUM LACTATE, POTASSIUM CHLORIDE, CALCIUM CHLORIDE 600; 310; 30; 20 MG/100ML; MG/100ML; MG/100ML; MG/100ML
100 INJECTION, SOLUTION INTRAVENOUS CONTINUOUS
Status: DISCONTINUED | OUTPATIENT
Start: 2025-05-06 | End: 2025-05-06

## 2025-05-06 RX ORDER — INDOMETHACIN 100 MG
SUPPOSITORY, RECTAL RECTAL
Status: DISCONTINUED
Start: 2025-05-06 | End: 2025-05-06

## 2025-05-06 RX ADMIN — LIDOCAINE HYDROCHLORIDE 5 ML: 10 INJECTION, SOLUTION EPIDURAL; INFILTRATION; INTRACAUDAL; PERINEURAL at 15:22:00

## 2025-05-06 RX ADMIN — SODIUM CHLORIDE, SODIUM LACTATE, POTASSIUM CHLORIDE, CALCIUM CHLORIDE: 600; 310; 30; 20 INJECTION, SOLUTION INTRAVENOUS at 15:53:00

## 2025-05-06 RX ADMIN — SODIUM CHLORIDE, SODIUM LACTATE, POTASSIUM CHLORIDE, CALCIUM CHLORIDE: 600; 310; 30; 20 INJECTION, SOLUTION INTRAVENOUS at 15:15:00

## 2025-05-06 NOTE — OPERATIVE REPORT
[facility] OPERATIVE REPORT   PATIENT NAME: Jared Nicole  MRN: [id number]  DATE OF OPERATION: 5/6/2025  PREOPERATIVE DIAGNOSIS: History of choledocholithiasis on Coumadin status post ERCP with biliary stent placement; here for repeat ERCP off Coumadin for stone extraction  POSTOPERATIVE DIAGNOSIS:    1.  Previously placed biliary stent was removed, subsequent large sphincterotomy and balloon papillotomy to 8 mm   2.  Markedly dilated CBD to 17 mm with removal of multiple large stones with balloon and basket sweeps  PROCEDURE PERFORMED: Endoscopic Retrograde Cholangiopancreatoscopy with stent removal, sphincterotomy, balloon papillotomy, stone extraction  SEDATION MEDICATIONS: MAC  PREOPERATIVE MEDS: Unasyn 3gm IVPB;  500cc lactated Ringer's solution IV  INTRAPROCEDURE MEDS:  indomethacin 100 mg Per rectum  PREPROCEDURE ASSESSMENT: The indication for this procedure is to assess for choledocholithiasis and stone extraction. The patient was identified by myself and nursing staff in the exam room. Informed consent was obtained. The patient was seen in clinic and a full H&P was obtained. On brief physical examination, airway is patent. Chest is clear. Heart has regular rate and rhythm. Abdomen is soft, nontender with good bowel sounds. A medication list was taken by nursing today and reviewed by myself. The patient is an ASA grade 2.   PROCEDURE NOTE: The procedure was completed without difficulty. The patient tolerated the procedure well.   The side-viewing duodenoscope was introduced into the esophagus and advanced to the 2nd portion.  Ampulla was visualized and appeared normal.  A large periampullary diverticulum was noted.  Biliary cannulation was achieved with a guidewire alongside the previously placed biliary stent. Previous biliary stent was removed with a snare.  Cholangiogram showed markedly dilated CBD of 17 mm with multiple filling defects.  A balloon sweep was performed using a 12-15 mm extraction  balloon and multiple stones were extracted.  Several smaller stones needed to be removed with a 2 cm trapezoid basket.  After multiple balloon sweeps, the occlusion cholangiogram was performed.  No further filling defects were identified.  Balloon was easily movable throughout the entire CBD.  The distal common bile duct did not have any occluding stones.  No bleeding was seen after sphincterotomy.  On fluoroscopy, cholecystostomy tube was noted.  There was no contrast in the cystic duct or the gallbladder.  No contrast was injected into the pancreatic duct either.  Scope was withdrawn from the patient and patient tolerated the procedure well.  FINDINGS   Markedly dilated CBD with multiple stones all removed with sphincterotomy, balloon papillotomy and balloon and basket sweeps  RECOMMENDATIONS: Resume Coumadin in 48 hours.  Advance diet as tolerated.  Proceed with recommendations by general surgery to remove gallbladder as an outpatient.  DISCHARGE:  On discharge, the patient was given an after-visit summary detailing the procedure, findings, followup plans, and an updated medication list.     Thank you very much for the consultation.  I really appreciate it.    Yoan Cunha MD

## 2025-05-06 NOTE — ANESTHESIA POSTPROCEDURE EVALUATION
[facility]    Jared Nicole Patient Status:  Hospital Outpatient Surgery   Age/Gender 79 year old male MRN [id number]   Location [facility] ENDOSCOPY PAIN [facility]ENTER Attending Yoan Cunha MD   Hosp Day # 0 PCP Eric Neil MD       Anesthesia Post-op Note    ENDOSCOPIC RETROGRADE CHOLANGIOPANCREATOGRAPHY (ERCP) WITHSPHINCTEROTOMY,BALLOON SWEEP, BILIARY DILATION 8MM, TRAPEZOID BASKET RETRIEVAL FLUOROSCOPY    Procedure Summary       Date: 05/06/25 Room / Location:  ENDOSCOPY 01 /  ENDOSCOPY    Anesthesia Start: 1515 Anesthesia Stop: 1558    Procedure: ENDOSCOPIC RETROGRADE CHOLANGIOPANCREATOGRAPHY (ERCP) WITHSPHINCTEROTOMY,BALLOON SWEEP, BILIARY DILATION 8MM, TRAPEZOID BASKET RETRIEVAL FLUOROSCOPY Diagnosis: (CHOLEDOCHOLITHIASIS)    Surgeons: Yoan Cunha MD Anesthesiologist: Good Mckay MD    Anesthesia Type: MAC ASA Status: 3            Anesthesia Type: MAC    Vitals Value Taken Time   BP 90/50 05/06/25 16:00   Temp  05/06/25 16:04   Pulse 86 05/06/25 16:03   Resp 18 05/06/25 16:00   SpO2 98 % 05/06/25 16:03   Vitals shown include unfiled device data.        Patient Location: Endoscopy    Anesthesia Type: MAC    Airway Patency: patent    Postop Pain Control: adequate    Mental Status: preanesthetic baseline    Nausea/Vomiting: none    Cardiopulmonary/Hydration status: stable euvolemic    Complications: no apparent anesthesia related complications    Postop vital signs: stable    Dental Exam: Unchanged from Preop    Patient to be discharged home when criteria met.

## 2025-05-06 NOTE — DISCHARGE INSTRUCTIONS
Home Care Instructions for Gastroscopy with Sedation    Diet:  - Resume your regular diet as tolerated unless otherwise instructed.  - Start with light meals to minimize bloating.  - Do not drink alcohol today.    Medication:  - RESUME COUMADIN IN 48 HOURS (5/8/2025 EVENING)    Activities:  - Take it easy today. Do not return to work today.  - Do not drive today.  - Do not operate any machinery today (including kitchen equipment).    Gastroscopy:  - You may have a sore throat for 2-3 days following the exam. This is normal. Gargling with warm salt water (1/2 tsp salt to 1 glass warm water) or using throat lozenges will help.  - If you experience any sharp pain in your neck, abdomen or chest, vomiting of blood, oral temperature over 100 degrees Fahrenheit, light-headedness or dizziness, or any other problems, contact your doctor.    **If unable to reach your doctor, please go to the [facility] Emergency Room**    - Your referring physician will receive a full report of your examination.  - If you do not hear from your doctor's office within two weeks of your biopsy, please call them for your results.

## 2025-05-06 NOTE — H&P
History & Physical Examination    Patient Name: Jared Nicole  MRN: [id number]  CSN: [id number]  YOB: 1945    Diagnosis: CHOLEDOCHOLITHIASIS      Present Illness:  Jared Nicole is a 79 year old male is here CHOLEDOCHOLITHIASIS.    Body mass index is 28.74 kg/m².    Past Medical History[1]    Procedure: ERCP    Physician Pre-Sedation Assessment    Pre-Sedation Assessment:    Sedation History:     ASA Classification: 2. Patient with mild systemic disease    Cardiac:    Respiratory:    Abdomen:      Plan: [facility] [place]        Current Hospital Medications[2]    Allergies: Allergies[3]    Past Surgical History[4]  Family History[5]  Social History     Tobacco Use    Smoking status: Former     Current packs/day: 0.00     Average packs/day: 1 pack/day for 20.0 years (20.0 ttl pk-yrs)     Types: Cigarettes     Start date: 1962     Quit date: 1982     Years since quittin.3    Smokeless tobacco: Never   Substance Use Topics    Alcohol use: Yes     Alcohol/week: 0.0 - 2.0 standard drinks of alcohol     Comment: MILD       SYSTEM Check if Review is Normal Check if Physical Exam is Normal If not normal, please explain:   HEENT [x ] [x ]    NECK & BACK [x ] [x ]    HEART [x ] [x ]    LUNGS [x ] [x ]    ABDOMEN [x ] [x ]    UROGENITAL [ ] [ ]    EXTREMITIES [ ] [ ]    OTHER        [ x ] I have discussed the risks and benefits and alternatives with the patient/family.  They understand and agree to proceed with plan of care.  [ x ] I have reviewed the History and Physical done within the last 30 days.  Any changes noted above.    Yoan Cunha MD  2025  3:55 PM             [1]   Past Medical History:   Anemia    Arrhythmia    A-FIB    Atrial fibrillation (HCC)    Cancer (HCC)    SKIN    CHF (congestive heart failure) (HCC)    mild     Colitis    DIVERTICULITIS    Colonic diverticular abscess    Congestive heart disease (HCC)    High blood pressure    High cholesterol    Hx musculoskletl dis NEC     BACK/DISC ISSUES    Long term (current) use of anticoagulants    Mitral regurgitation    Mild to moderate     Mitral valve prolapse    Monitoring for long-term anticoagulant use    Other and unspecified hyperlipidemia    Perforated bowel (HCC)    Prediabetes    SOB (shortness of breath)    Unspecified essential hypertension    Visual impairment   [2]   Current Facility-Administered Medications   Medication Dose Route Frequency    lactated ringers infusion   Intravenous Continuous    lactated ringers IV bolus 1,000 mL  1,000 mL Intravenous Once    Followed by    lactated ringers infusion  100 mL/hr Intravenous Continuous    indomethacin (Indocin) 100 MG rectal suppository 100 mg  100 mg Rectal Once    sodium chloride 0.9% IVPB        ampicillin-sulbactam (Unasyn) 3 (2-1) g injection        indomethacin (Indocin) 100 MG rectal suppository         Facility-Administered Medications Ordered in Other Encounters   Medication Dose Route Frequency    lidocaine PF (Xylocaine-MPF) 1% injection   Intravenous PRN    propofol (Diprivan) 10 MG/ML injection   Intravenous PRN    propofol (Diprivan) 10 mg/mL infusion premix   Intravenous Continuous PRN   [3] No Known Allergies  [4]   Past Surgical History:  Procedure Laterality Date    Colectomy      Colon surgery      Colonoscopy  4/2016    No polyps/inflammation noted, moderate to severe diverticulosis. Fecal transplant completed.    Colostomy      with reversal 2012    Other surgical history Left 2001    eye surgery, detached retina    Other surgical history N/A 4/12/2016    Procedure: COLONOSCOPY W/FECAL MICROBIOTA TRANSPLANT;  Surgeon: Dave Valle MD;  Location:  ENDOSCOPY   [5]   Family History  Problem Relation Age of Onset    Lung Disorder Other         COPD, Family History of     Cancer Mother         Colon, Family History of

## 2025-05-06 NOTE — ANESTHESIA PREPROCEDURE EVALUATION
PRE-OP EVALUATION    Patient Name: Jared Nicole    Admit Diagnosis: CHOLEDOCHOLITHIASIS    Pre-op Diagnosis: CHOLEDOCHOLITHIASIS    ENDOSCOPIC RETROGRADE CHOLANGIOPANCREATOGRAPHY (ERCP) WITH FLUOROSCOPY    Anesthesia Procedure: ENDOSCOPIC RETROGRADE CHOLANGIOPANCREATOGRAPHY (ERCP) WITH FLUOROSCOPY    Surgeons and Role:     * Yoan Cunha MD - Primary    Pre-op vitals reviewed.        Body mass index is 28.74 kg/m².    Current medications reviewed.  Hospital Medications:  Current Medications[1]    Outpatient Medications:   Prescriptions Prior to Admission[2]    Allergies: Patient has no known allergies.      Anesthesia Evaluation    Patient summary reviewed.    Anesthetic Complications           GI/Hepatic/Renal                                 Cardiovascular        Exercise tolerance: good     MET: >4      (+) hypertension   (+) hyperlipidemia          (+) valvular problems/murmurs     (+) dysrhythmias and atrial fibrillation  (+) CHF                Endo/Other                                  Pulmonary        (+) COPD       (+) shortness of breath            Neuro/Psych                                    Past Surgical History[3]  Social Hx on file[4]  History   Drug Use No     Available pre-op labs reviewed.  Lab Results   Component Value Date    WBC 11.5 (H) 04/11/2025    RBC 4.39 04/11/2025    HGB 13.4 04/11/2025    HCT 39.9 04/11/2025    MCV 90.9 04/11/2025    MCH 30.5 04/11/2025    MCHC 33.6 04/11/2025    RDW 14.3 04/11/2025    .0 04/11/2025     Lab Results   Component Value Date     04/14/2025    K 4.1 04/14/2025     04/14/2025    CO2 24.0 04/14/2025    BUN 21 04/14/2025    CREATSERUM 1.23 04/14/2025     (H) 04/14/2025    CA 9.3 04/14/2025            Airway      Mallampati: II  Mouth opening: >3 FB  TM distance: > 6 cm   Cardiovascular    Cardiovascular exam normal.         Dental             Pulmonary    Pulmonary exam normal.                 Other findings        ASA: 3    Plan: MAC  NPO status verified and           Plan/risks discussed with: patient            Present on Admission:  **None**               [1]  • lactated ringers infusion   Intravenous Continuous   • lactated ringers IV bolus 1,000 mL  1,000 mL Intravenous Once    Followed by   • lactated ringers infusion  100 mL/hr Intravenous Continuous   • indomethacin (Indocin) 100 MG rectal suppository 100 mg  100 mg Rectal Once   • ampicillin-sulbactam (Unasyn) 3 g in sodium chloride 0.9% 100mL IVPB-ADD  3 g Intravenous Once   • [COMPLETED] potassium chloride (Klor-Con M20) tab 40 mEq  40 mEq Oral Q4H   • [COMPLETED] warfarin (Coumadin) tab 7.5 mg  7.5 mg Oral Once at night   • [COMPLETED] potassium chloride (Klor-Con M20) tab 40 mEq  40 mEq Oral Q4H   • [COMPLETED] sodium phosphate 15 mmol in 0.9% NaCl 100mL IVPB premix  15 mmol Intravenous Once   • [COMPLETED] potassium chloride (Klor-Con M20) tab 40 mEq  40 mEq Oral Q4H   • [COMPLETED] metoprolol (Lopressor) 5 mg/5mL injection 5 mg  5 mg Intravenous Once   • [COMPLETED] piperacillin-tazobactam (Zosyn) 3.375 (3-0.375) g injection       • [COMPLETED] lidocaine (Xylocaine) 1 % injection       • [COMPLETED] dextrose 5% IVPB       • [COMPLETED] fentaNYL (Sublimaze) 50 mcg/mL injection       • [COMPLETED] midazolam (Versed) 2 MG/2ML injection       • [COMPLETED] iopamidol (ISOVUE-300) 61 % injection 25 mL  25 mL Intravascular ONCE PRN   • [COMPLETED] dilTIAZem (cardIZEM) 25 mg/5mL injection 10 mg  10 mg Intravenous Once   [2]  Medications Prior to Admission   Medication Sig Dispense Refill Last Dose/Taking   • Metoprolol Tartrate 75 MG Oral Tab Take 75 mg by mouth 2x Daily(Beta Blocker). 180 tablet 1 Taking   • furosemide 40 MG Oral Tab Take 1 tablet (40 mg total) by mouth daily. 30 tablet 0 Taking   • DILTIAZEM  MG Oral Capsule SR 24 Hr TAKE 1 CAPSULE EVERY DAY 90 capsule 3 Taking   • ATORVASTATIN 10 MG Oral Tab TAKE 1 TABLET EVERY DAY 90 tablet 3 Taking   • TAMSULOSIN  0.4 MG Oral Cap TAKE 1 CAPSULE EVERY DAY 90 capsule 3 Taking   • warfarin 2.5 MG Oral Tab TAKE ONE TABLET BY MOUTH 2 DAYS WEEKLY OR AS DIRECTED BY ANTICOAGULATION CLINIC. 30 tablet 3 Taking   • ascorbic acid (VITAMIN C) 500 MG Oral Tab Take 1 tablet (500 mg total) by mouth in the morning.   Taking   • Cholecalciferol (VITAMIN D) 1000 UNITS Oral Cap Take 1 capsule by mouth in the morning.   Taking   • warfarin 5 MG Oral Tab TAKE ONE TABLET BY MOUTH 5 DAYS WEEKLY OR AS DIRECTED BY ANTICOAGULATION CLINIC. (Patient not taking: Reported on 2025) 65 tablet 3 Not Taking   [3]  Past Surgical History:  Procedure Laterality Date   • Colectomy     • Colon surgery     • Colonoscopy  2016    No polyps/inflammation noted, moderate to severe diverticulosis. Fecal transplant completed.   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

## 2025-05-19 ENCOUNTER — OFFICE VISIT (OUTPATIENT)
Dept: FAMILY MEDICINE CLINIC | Facility: CLINIC | Age: 80
End: 2025-05-19
Payer: MEDICARE

## 2025-05-19 VITALS
OXYGEN SATURATION: 98 % | WEIGHT: 188 LBS | RESPIRATION RATE: 16 BRPM | HEIGHT: 68 IN | HEART RATE: 76 BPM | SYSTOLIC BLOOD PRESSURE: 110 MMHG | DIASTOLIC BLOOD PRESSURE: 70 MMHG | TEMPERATURE: 98 F | BODY MASS INDEX: 28.49 KG/M2

## 2025-05-19 DIAGNOSIS — I10 PRIMARY HYPERTENSION: Primary | ICD-10-CM

## 2025-05-19 DIAGNOSIS — I48.20 CHRONIC ATRIAL FIBRILLATION (HCC): ICD-10-CM

## 2025-05-19 DIAGNOSIS — E78.2 MIXED HYPERLIPIDEMIA: ICD-10-CM

## 2025-05-19 DIAGNOSIS — K80.01 CALCULUS OF GALLBLADDER WITH ACUTE CHOLECYSTITIS AND OBSTRUCTION: ICD-10-CM

## 2025-05-19 DIAGNOSIS — I50.22 CHRONIC SYSTOLIC CONGESTIVE HEART FAILURE (HCC): ICD-10-CM

## 2025-05-19 PROCEDURE — G2211 COMPLEX E/M VISIT ADD ON: HCPCS | Performed by: FAMILY MEDICINE

## 2025-05-19 PROCEDURE — 3074F SYST BP LT 130 MM HG: CPT | Performed by: FAMILY MEDICINE

## 2025-05-19 PROCEDURE — 99215 OFFICE O/P EST HI 40 MIN: CPT | Performed by: FAMILY MEDICINE

## 2025-05-19 PROCEDURE — 1160F RVW MEDS BY RX/DR IN RCRD: CPT | Performed by: FAMILY MEDICINE

## 2025-05-19 PROCEDURE — 3008F BODY MASS INDEX DOCD: CPT | Performed by: FAMILY MEDICINE

## 2025-05-19 PROCEDURE — 3078F DIAST BP <80 MM HG: CPT | Performed by: FAMILY MEDICINE

## 2025-05-19 PROCEDURE — 1159F MED LIST DOCD IN RCRD: CPT | Performed by: FAMILY MEDICINE

## 2025-05-19 RX ORDER — FUROSEMIDE 40 MG/1
40 TABLET ORAL DAILY
COMMUNITY
Start: 2025-05-12

## 2025-05-19 NOTE — PROGRESS NOTES
Subjective:   Patient ID: Jared Nicole is a 79 year old male.    HPI  Mr. Nicole is a pleasant 79-year-old gentleman with history of hypertension, A. fib on anticoagulation with warfarin, CHF, COPD, hyperlipidemia, prediabetes, BPH, recent hospitalization for gallbladder stones with acute cholecystitis here for his follow-up appointment but he will be seeing his surgeon tomorrow to decide if he will undergo cholecystectomy.  He has his cholecystostomy done 411.    He feels well overall.  No fever no cough no chest pain no shortness of breath no nausea no vomiting no abdominal pain.      I had reviewed past medical and family histories together with allergy and medication lists documented.    History/Other:   Review of Systems   Constitutional:  Negative for fatigue and fever.   HENT:  Negative for sore throat and trouble swallowing.    Respiratory:  Negative for cough and shortness of breath.    Cardiovascular:  Negative for chest pain, palpitations and leg swelling.   Gastrointestinal:  Negative for abdominal pain, blood in stool, constipation, diarrhea, nausea and vomiting.   Genitourinary:  Negative for difficulty urinating.   Neurological:  Negative for dizziness.     Current Medications[1]  Allergies:Allergies[2]    Objective:   Physical Exam  Vitals reviewed.   Constitutional:       General: He is not in acute distress.  HENT:      Mouth/Throat:      Mouth: Mucous membranes are moist.      Pharynx: Oropharynx is clear.   Eyes:      General: No scleral icterus.        Right eye: No discharge.         Left eye: No discharge.      Conjunctiva/sclera: Conjunctivae normal.   Cardiovascular:      Rate and Rhythm: Normal rate and regular rhythm.      Heart sounds: Normal heart sounds. No murmur heard.  Pulmonary:      Effort: Pulmonary effort is normal. No respiratory distress.      Breath sounds: Normal breath sounds. No wheezing or rales.   Abdominal:      General: Bowel sounds are normal. There is no distension.       Palpations: Abdomen is soft. There is no mass.      Tenderness: There is no abdominal tenderness. There is no guarding.      Comments: Cholecystotomy drain in place on the right upper quadrant with no noted erythema no warmth no swelling no tenderness.  Cholecystostomy bag in place with mild yellowish output with no sediments nor blood noted.   Musculoskeletal:      Cervical back: Neck supple.      Right lower leg: No edema.      Left lower leg: No edema.   Lymphadenopathy:      Cervical: No cervical adenopathy.   Skin:     General: Skin is warm.   Neurological:      Mental Status: He is alert.   Psychiatric:         Mood and Affect: Mood normal.         Behavior: Behavior normal.         Thought Content: Thought content normal.   Assessment & Plan:   1. Primary hypertension   -Well-controlled  - Continue meds   2. Mixed hyperlipidemia   -Stable  - Continue meds   3. Chronic atrial fibrillation (HCC)   -Currently NSR  - Continue meds and anticoagulation  - Continue follow-up with cardiology   4. Chronic systolic congestive heart failure (HCC)   -Stable and asymptomatic  - Continue medicines continue follow-up and management per cardiology   5. Calculus of gallbladder with acute cholecystitis and obstruction   - Stable  - Await recommendations and management per general surgery   Follow-up in 6 months or as needed    This note was prepared using Dragon [employer] voice recognition dictation software. As a result errors may occur. When identified these errors have been corrected. While every attempt is made to correct errors during dictation discrepancies may still exist            No orders of the defined types were placed in this encounter.      Meds This Visit:  Requested Prescriptions      No prescriptions requested or ordered in this encounter       Imaging & Referrals:  None         [1]   Current Outpatient Medications   Medication Sig Dispense Refill    furosemide 40 MG Oral Tab Take 1 tablet (40 mg total) by  mouth daily.      Metoprolol Tartrate 75 MG Oral Tab Take 75 mg by mouth 2x Daily(Beta Blocker). 180 tablet 1    DILTIAZEM  MG Oral Capsule SR 24 Hr TAKE 1 CAPSULE EVERY DAY 90 capsule 3    ATORVASTATIN 10 MG Oral Tab TAKE 1 TABLET EVERY DAY 90 tablet 3    TAMSULOSIN 0.4 MG Oral Cap TAKE 1 CAPSULE EVERY DAY 90 capsule 3    warfarin 2.5 MG Oral Tab TAKE ONE TABLET BY MOUTH 2 DAYS WEEKLY OR AS DIRECTED BY ANTICOAGULATION CLINIC. 30 tablet 3    warfarin 5 MG Oral Tab TAKE ONE TABLET BY MOUTH 5 DAYS WEEKLY OR AS DIRECTED BY ANTICOAGULATION CLINIC. 65 tablet 3    ascorbic acid (VITAMIN C) 500 MG Oral Tab Take 1 tablet (500 mg total) by mouth in the morning.      Cholecalciferol (VITAMIN D) 1000 UNITS Oral Cap Take 1 capsule by mouth in the morning.     [2] No Known Allergies

## 2025-05-19 NOTE — PATIENT INSTRUCTIONS
Thank you for choosing Eric Neil MD at [facility]  To Do: Jared DE ANDA Corey  1. Please see below   Call [phone] to schedule the appointment.   Please signup for [email or web address], which is electronic access to your record if you have not done so.  All your results will post on there.  [email or web address]   You can NOW use [email or web address] to book your appointments with us, or consider using open access scheduling which is through the [place] website [email or web address] and type in Eric Neil MD and follow the links for \"Schedule Online Now\"    To schedule Imaging or tests at [place] call Central Scheduling [phone], Go to [facility] A ER Building (For example: CT scans, X rays, Ultrasound, MRI)  Cardiac Testing in ER building Building A second floor Cardiac Testing [phone] (For example: Holter Monitor, Cardiac Stress tests,Event Monitor, or 2D Echocardiograms)  [facility] call [phone] usually in [facility] A  Walk in Clinic in [place] at [address] Mon-Fri at 8am-7:30 p.m., and Sat/Sun 9:00a.m.-4:30 p.m.  Also at [address] [place] [address]  Call [phone] for info     Please call our office about any questions regarding your treatment/medicines/tests as a result of today's visit.  For your safety, read the entire package insert of all medicines prescribed to you and be aware of all of the risks of treatment even beyond those discussed today.  All therapies have potential risk of harm or side effects or medication interactions.  It is your duty and for your safety to discuss with the pharmacist and our office with questions, and to notify us and stop treatment if problems arise, but know that our intention is that the benefits outweigh those potential risks and we strive to make you healthier and to improve your quality of life.    Referrals, and Radiology Information:    If your insurance requires a referral to a specialist, please allow 5 business days to process  your referral request.    If Eric Neil MD orders a CT or MRI, it may take up to 10 business days to receive approval from your insurance company. Once our office has called informing you that the insurance company approved your testing, please call Central Scheduling at [phone]  Please allow our office 5 business days to contact you regarding any testing results.    Refill policies:   Allow 3 business days for refills; controlled substances may take longer and must be picked up from the office in person.  Narcotic medications can only be filled in 30 day increments and must be refilled at an office visit only.  If your prescription is due for a refill, you may be due for a follow-up appointment.  We cannot refill your maintenance medications at a preventative wellness visit.  To best provide you care, patients receiving maintenance medications need to be seen at least twice a year.

## 2025-05-20 ENCOUNTER — OFFICE VISIT (OUTPATIENT)
Facility: LOCATION | Age: 80
End: 2025-05-20
Payer: MEDICARE

## 2025-05-20 VITALS
HEART RATE: 45 BPM | TEMPERATURE: 98 F | OXYGEN SATURATION: 98 % | DIASTOLIC BLOOD PRESSURE: 58 MMHG | SYSTOLIC BLOOD PRESSURE: 91 MMHG

## 2025-05-20 DIAGNOSIS — K80.01 CALCULUS OF GALLBLADDER WITH ACUTE CHOLECYSTITIS AND OBSTRUCTION: Primary | ICD-10-CM

## 2025-05-20 PROCEDURE — 3078F DIAST BP <80 MM HG: CPT | Performed by: SURGERY

## 2025-05-20 PROCEDURE — 3074F SYST BP LT 130 MM HG: CPT | Performed by: SURGERY

## 2025-05-20 PROCEDURE — 99213 OFFICE O/P EST LOW 20 MIN: CPT | Performed by: SURGERY

## 2025-05-20 PROCEDURE — 1159F MED LIST DOCD IN RCRD: CPT | Performed by: SURGERY

## 2025-05-20 PROCEDURE — 1160F RVW MEDS BY RX/DR IN RCRD: CPT | Performed by: SURGERY

## 2025-05-20 NOTE — H&P
New Patient Visit Note       Active Problems      1. Calculus of gallbladder with acute cholecystitis and obstruction        Chief Complaint   Chief Complaint   Patient presents with    New Patient     NP - ENDOSCOPIC RETROGRADE CHOLANGIOPANCREATOGRAPHY (ERCP) with biliary stent placement 4/9, Drain located where gallbladder is, Discuss possible gallbladder removal, US GALLBLADDER 4/8, CT ABDOMEN+PELVIS (CONTRAST ONLY) 4/8. Patient states only having back pain.        History of Present Illness     The patient presents for follow-up after recent hospitalization for acute cholecystitis and choledocholithiasis.  Due to increased cardiac risk the patient was treated nonoperatively with percutaneous drainage via cholecystostomy tube.  The patient has completed ERCP, papillotomy and stone extraction by Dr. Cunha.  The patient now presents for further management options.    I explained to the patient and spouse that in view of his elevated cardiac risk we will attempt to forego surgery.  We will, rather, schedule a cholangiogram through his existing catheter and to determine if the cystic duct is patent.  If so the tube can be removed and the patient will follow-up on his as needed basis.  If the cystic duct is occluded the tube will remain in place and the patient will return to my attention for periodic monitoring.    Allergies  Jared has no known allergies.    Past Medical / Surgical / Social / Family History    The past medical and past surgical history have been reviewed by me today.    Past Medical History[1]  Past Surgical History[2]    The family history and social history have been reviewed by me today.    Family History[3]  Social Hx on file[4]   Medications - Current[5]      Review of Systems  The Review of Systems has been reviewed by me during today.  Review of Systems   Constitutional: Negative.    HENT: Negative.     Eyes: Negative.    Respiratory: Negative.     Cardiovascular: Negative.     Gastrointestinal: Negative.    Genitourinary: Negative.    Musculoskeletal: Negative.    Skin: Negative.    Neurological: Negative.    Psychiatric/Behavioral: Negative.         Physical Findings   BP 91/58 (BP Location: Left arm, Patient Position: Sitting, Cuff Size: adult)   Pulse (!) 45   Temp 97.7 °F (36.5 °C) (Temporal)   SpO2 98%   Physical Exam  Vitals and nursing note reviewed.   Constitutional:       General: He is not in acute distress.     Appearance: He is well-developed.   HENT:      Head: Normocephalic and atraumatic.   Eyes:      General: No scleral icterus.     Pupils: Pupils are equal, round, and reactive to light.   Neck:      Vascular: No JVD.      Trachea: No tracheal deviation.   Cardiovascular:      Rate and Rhythm: Normal rate and regular rhythm.   Pulmonary:      Effort: Pulmonary effort is normal. No respiratory distress.      Breath sounds: No stridor.   Abdominal:      General: Bowel sounds are normal. There is no distension.      Palpations: Abdomen is soft. Abdomen is not rigid. There is no mass.      Tenderness: There is no abdominal tenderness. There is no guarding or rebound. Negative signs include Antonio's sign and McBurney's sign.      Hernia: No hernia is present.   Musculoskeletal:         General: Normal range of motion.      Cervical back: Normal range of motion and neck supple.   Skin:     General: Skin is warm and dry.   Neurological:      Mental Status: He is alert and oriented to person, place, and time.   Psychiatric:         Behavior: Behavior normal.             Assessment   1. Calculus of gallbladder with acute cholecystitis and obstruction          Plan     The patient is overall doing well following percutaneous drainage of the gallbladder and ERCP with stone extraction and papillotomy.    The patient is at elevated cardiac risk for surgical intervention therefore we will forego laparoscopic cholecystectomy now.    I have ordered cholangiogram through the existing  percutaneous catheter to determine if the cystic duct is patent.    If the duct is patent tube can be removed and the patient will follow-up on an as-needed basis.    If the cystic duct remains occluded the tube will remain in place and the patient will follow-up with me for periodic surveillance    The patient was provided ample opportunity to ask questions.  All of the patient's questions were answered in detail.  The patient voiced understanding of the care plan.     No orders of the defined types were placed in this encounter.      Imaging & Referrals   None    Follow Up  No follow-ups on file.    Janes Dias MD         [1]   Past Medical History:   Anemia    Arrhythmia    A-FIB    Atrial fibrillation (HCC)    Cancer (HCC)    SKIN    CHF (congestive heart failure) (HCC)    mild     Colitis    DIVERTICULITIS    Colonic diverticular abscess    Congestive heart disease (HCC)    Diverticulosis of intestine    High blood pressure    High cholesterol    Hx musculoskletl dis NEC    BACK/DISC ISSUES    Long term (current) use of anticoagulants    Mitral regurgitation    Mild to moderate     Mitral valve prolapse    Monitoring for long-term anticoagulant use    Other and unspecified hyperlipidemia    Perforated bowel (HCC)    Prediabetes    Skin cancer    SOB (shortness of breath)    Unspecified essential hypertension    Visual impairment   [2]   Past Surgical History:  Procedure Laterality Date    Cataract  4 years ago right&left    Colectomy      Colon surgery      Colonoscopy  04/01/2016    No polyps/inflammation noted, moderate to severe diverticulosis. Fecal transplant completed.    Colonoscopy  4 years ago    Colostomy      with reversal 2012    Laparoscopic cholecystectomy  2025    Other surgical history Left 01/01/2001    eye surgery, detached retina    Other surgical history N/A 04/12/2016    Procedure: COLONOSCOPY W/FECAL MICROBIOTA TRANSPLANT;  Surgeon: Dave Valle MD;  Location:  ENDOSCOPY     Skin surgery  Last year   [3]   Family History  Problem Relation Age of Onset    Lung Disorder Other         COPD, Family History of     Cancer Mother         Colon, Family History of     Colon Cancer Mother     Pulmonary Disease Father    [4]   Social History  Socioeconomic History    Marital status:    Occupational History    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, 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    Tobacco Use    Smoking status: Former     Current packs/day: 0.00     Average packs/day: 1 pack/day for 20.0 years (20.0 ttl pk-yrs)     Types: Cigarettes     Start date: 1962     Quit date: 1982     Years since quittin.4    Smokeless tobacco: Never   Vaping Use    Vaping status: Never Used   Substance and Sexual Activity    Alcohol use: Yes     Alcohol/week: 0.0 - 2.0 standard drinks of alcohol     Comment: 0-1 beer/week    Drug use: No   Other Topics Concern    Caffeine Concern No    Stress Concern No    Weight Concern No    Special Diet No    Exercise No    Seat Belt No   [5]   Current Outpatient Medications:     furosemide 40 MG Oral Tab, Take 1 tablet (40 mg total) by mouth daily., Disp: , Rfl:     Metoprolol Tartrate 75 MG Oral Tab, Take 75 mg by mouth 2x Daily(Beta Blocker)., Disp: 180 tablet, Rfl: 1    DILTIAZEM  MG Oral Capsule SR 24 Hr, TAKE 1 CAPSULE EVERY DAY, Disp: 90 capsule, Rfl: 3    ATORVASTATIN 10 MG Oral Tab, TAKE 1 TABLET EVERY DAY, Disp: 90 tablet, Rfl: 3    TAMSULOSIN 0.4 MG Oral Cap, TAKE 1 CAPSULE EVERY DAY, Disp: 90 capsule, Rfl: 3    warfarin 2.5 MG Oral Tab, TAKE ONE TABLET BY MOUTH 2 DAYS WEEKLY OR AS DIRECTED BY ANTICOAGULATION CLINIC., Disp: 30 tablet, Rfl: 3    warfarin 5 MG Oral Tab, TAKE ONE TABLET BY MOUTH 5 DAYS WEEKLY OR AS DIRECTED BY ANTICOAGULATION CLINIC., Disp: 65 tablet, Rfl: 3    ascorbic acid (VITAMIN C) 500 MG Oral Tab, Take 1 tablet (500 mg total) by mouth in the morning., Disp: , Rfl:     Cholecalciferol (VITAMIN D) 1000 UNITS Oral  Cap, Take 1 capsule by mouth in the morning., Disp: , Rfl:

## 2025-06-02 ENCOUNTER — OFFICE VISIT (OUTPATIENT)
Dept: ORTHOPEDICS CLINIC | Facility: CLINIC | Age: 80
End: 2025-06-02
Payer: MEDICARE

## 2025-06-02 ENCOUNTER — HOSPITAL ENCOUNTER (OUTPATIENT)
Dept: GENERAL RADIOLOGY | Age: 80
Discharge: HOME | End: 2025-06-02
Attending: NURSE PRACTITIONER
Payer: MEDICARE

## 2025-06-02 ENCOUNTER — TELEPHONE (OUTPATIENT)
Dept: ORTHOPEDICS CLINIC | Facility: CLINIC | Age: 80
End: 2025-06-02

## 2025-06-02 VITALS — BODY MASS INDEX: 28.49 KG/M2 | WEIGHT: 188 LBS | HEIGHT: 68 IN

## 2025-06-02 DIAGNOSIS — M54.50 LOW BACK PAIN, UNSPECIFIED BACK PAIN LATERALITY, UNSPECIFIED CHRONICITY, UNSPECIFIED WHETHER SCIATICA PRESENT: ICD-10-CM

## 2025-06-02 DIAGNOSIS — G89.29 CHRONIC BILATERAL LOW BACK PAIN WITH RIGHT-SIDED SCIATICA: Primary | ICD-10-CM

## 2025-06-02 DIAGNOSIS — M54.50 LOW BACK PAIN, UNSPECIFIED BACK PAIN LATERALITY, UNSPECIFIED CHRONICITY, UNSPECIFIED WHETHER SCIATICA PRESENT: Primary | ICD-10-CM

## 2025-06-02 DIAGNOSIS — M54.41 CHRONIC BILATERAL LOW BACK PAIN WITH RIGHT-SIDED SCIATICA: Primary | ICD-10-CM

## 2025-06-02 PROCEDURE — 72100 X-RAY EXAM L-S SPINE 2/3 VWS: CPT | Performed by: NURSE PRACTITIONER

## 2025-06-02 PROCEDURE — 99204 OFFICE O/P NEW MOD 45 MIN: CPT | Performed by: NURSE PRACTITIONER

## 2025-06-02 PROCEDURE — 1125F AMNT PAIN NOTED PAIN PRSNT: CPT | Performed by: NURSE PRACTITIONER

## 2025-06-02 PROCEDURE — 1159F MED LIST DOCD IN RCRD: CPT | Performed by: NURSE PRACTITIONER

## 2025-06-02 PROCEDURE — 3008F BODY MASS INDEX DOCD: CPT | Performed by: NURSE PRACTITIONER

## 2025-06-02 PROCEDURE — 1160F RVW MEDS BY RX/DR IN RCRD: CPT | Performed by: NURSE PRACTITIONER

## 2025-06-02 RX ORDER — METHYLPREDNISOLONE 4 MG/1
TABLET ORAL
Qty: 1 EACH | Refills: 0 | Status: SHIPPED | OUTPATIENT
Start: 2025-06-02

## 2025-06-02 NOTE — H&P
[facility] - ORTHOPEDICS  [address] [place] [address], [address] [address]  [address] [place] [id number]  [phone]     NEW PATIENT VISIT - HISTORY AND PHYSICAL EXAMINATION     Name: Jared Nicole   MRN: [id number]  Date: 6/2/2025     CC:   Chief Complaint   Patient presents with    Low Back Pain     LOW BACK PAIN; ONSET: 6-8 WEEKS AGO; POSSIBLE INJURY FROM MOVING TV        REFERRED BY: self  PCP: Eric Neil MD    HPI:   Jared Nicole is a very pleasant 79 year old male who presents today for evaluation of back and leg pain. The distribution of symptoms are: 50% backpain and 50%right leg pain anterior thigh from the anterior lateral leg to the knee. The symptoms began 6-8 weeks ago after lifting a TV with his son, noticed back pain that started on the right and moved to the left. 1 week ago started with right anterior thigh pain from lateral anterior thigh to the knee. Since the onset, the symptoms have become slowly worse over time. Patient feels pain is aggravated by sit to stand, mornings  and improved by walking. The patient reports no numbness and no weakness.  The symptom characteristics are as follows: ache.     Prior spine surgery: 35 years ago lumbar microdiscectomy with chymopapain inj - relief of radicular leg pain at that time    2015 right sacral ala insufficiency fracture and chronic L1 compression fracture noted    Bowel and bladder symptoms: absent.  Fall/injury: 6-8 weeks ago     The patient has not had issues with balance and/or hand dexterity problems such as changes in penmanship or the use of buttons or zippers.    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    MEDICATIONS: Current Medications[4]    ROS: A comprehensive 14 point review of systems was performed and was negative aside from the aforementioned per history of present illness.    SOCIAL HX:  Social History     Tobacco Use    Smoking status: Former     Current packs/day: 0.00     Average packs/day: 1 pack/day for 20.0 years (20.0 ttl pk-yrs)     Types: Cigarettes     Start date: 1962     Quit date: 1982     Years since quittin.4    Smokeless tobacco: Never   Substance Use Topics    Alcohol use: Yes     Alcohol/week: 0.0 - 2.0 standard drinks of alcohol     Comment: 0-1 beer/week       Lives with spouse- RN  Hobby: go to the [email or web address]   Work: retired     PE:   Vitals:    25 1425   Weight: 188 lb (85.3 kg)   Height: 5' 8\" (1.727 m)     Estimated body mass index is 28.59 kg/m² as calculated from the following:    Height as of this encounter: 5' 8\" (1.727 m).    Weight as of this encounter: 188 lb (85.3 kg).    Physical Exam  Constitutional:       Appearance: Normal appearance.   HENT:      Head: Normocephalic and atraumatic.   Eyes:      Extraocular Movements: Extraocular movements intact.   Cardiovascular:      Pulses: Normal pulses. Skin warm and well perfused.  Pulmonary:      Effort: Pulmonary effort is normal. No respiratory distress.   Skin:     General: Skin is warm.   Psychiatric:         Mood and Affect: Mood normal.     Spine Exam:    Normal gait without difficulty  Able to heel, toe walk without difficulty  Level shoulders and hips in even stance    Restricted L-spine ROM    No tenderness to palpation of L-spine    Straight leg raise test: negative    Sustained clonus: negative    LE Strength: 5/5 IP QUAD TA EHL GSC  LE Sensation: normal in L2-S1 distribution  LE reflexes: diminished throughout the LE, symmetric     Radiographic Examination/Diagnostics:  xray personally viewed, independently interpreted and radiology report was reviewed.    Radiographs  Dated:25    Study:lumbar spine xray  Demonstrates: Chronic compression fracture of L1 as noted on CT lumbar spine. Advanced spondylosis throughout the lumbar spine. No new fractures as per radiology. Lumbar scoliosis.     IMPRESSION: Jared Nicole is a 79 year old male with    1. Chronic bilateral low back pain with right-sided sciatica  - OP REFERRAL TO [facility]  - methylPREDNISolone (MEDROL) 4 MG Oral Tablet Therapy Pack; As directed.  Dispense: 1 each; Refill: 0    PLAN:     We reviewed the patients history, symptoms, exam findings, and imaging today.  We had a detailed discussion outlining the etiology, anatomy, pathophysiology, and natural history of low back pain with right radiculopathy. The typical management of this condition may include lifestyle modification, physical therapy, oral steroids, epidural injections, neuromodulatory medications.  Based on our discussion today we would like to have the patient initiate our recommendations for continued conservative therapy in the treatment of their condition noted in the assessment section.       -To start with physical therapy for the lumbar spine. If not satisfied with His condition after completion of PT, then we will consider ordering advanced imaging at the follow up visit.  -Home exercise handout provided- counseled to work on isometric as tolerated to start.   -Start medrol dose pack for pain. Note sent to provider prescribing warfarin due to potential need for medication adjustment while on the steroid. To continue tylenol for pain. OTC pain medication list provided on AVS. Risks and benefits of the medications reviewed.   -Okay for lumbar support brace PRN as needed.   Questions encouraged and answered.     FOLLOW-UP:  We will see him back in follow-up in 4 weeks, or sooner if any problems arise. Patient understands and agrees with plan.    ANIVAL Tiwari   Collaborative: Charanjit Worthington MD  Orthopedic Spine Surgeon  [facility]  Orthopaedic Surgery   [address] [place] [address], [address], [place] [address]   t: [phone]   f: [phone]        This note was dictated using Dragon software.  While it was briefly proofread prior to completion, some grammatical, spelling, and word choice errors due to dictation may still occur.             [1]   Past Medical History:   Anemia    Arrhythmia    A-FIB    Atrial fibrillation (HCC)    Cancer (HCC)    SKIN    CHF (congestive heart failure) (HCC)    mild     Colitis    DIVERTICULITIS    Colonic diverticular abscess    Congestive heart disease (HCC)    Diverticulosis of intestine    High blood pressure    High cholesterol    Hx musculoskletl dis NEC    BACK/DISC ISSUES    Long term (current) use of anticoagulants    Mitral regurgitation    Mild to moderate     Mitral valve prolapse    Monitoring for long-term anticoagulant use    Other and unspecified hyperlipidemia    Perforated bowel (HCC)    Prediabetes    Skin cancer    SOB (shortness of breath)    Unspecified essential hypertension    Visual impairment   [2]   Past Surgical History:  Procedure Laterality Date    Cataract  4 years ago right&left    Colectomy      Colon surgery      Colonoscopy  04/01/2016    No polyps/inflammation noted, moderate to severe diverticulosis. Fecal transplant completed.    Colonoscopy  4 years ago    Colostomy      with reversal 2012    Laparoscopic cholecystectomy  2025    Other surgical history Left 01/01/2001    eye surgery, detached retina    Other surgical history N/A 04/12/2016    Procedure: COLONOSCOPY W/FECAL MICROBIOTA TRANSPLANT;  Surgeon: Dave Valle MD;  Location:  ENDOSCOPY    Skin surgery  Last year   [3]   Family History  Problem Relation Age of Onset    Lung Disorder Other         COPD, Family History of     Cancer Mother         Colon, Family History of     Colon Cancer Mother     Pulmonary Disease Father    [4]   Current Outpatient Medications   Medication Sig Dispense Refill     methylPREDNISolone (MEDROL) 4 MG Oral Tablet Therapy Pack As directed. 1 each 0    furosemide 40 MG Oral Tab Take 1 tablet (40 mg total) by mouth daily.      Metoprolol Tartrate 75 MG Oral Tab Take 75 mg by mouth 2x Daily(Beta Blocker). 180 tablet 1    DILTIAZEM  MG Oral Capsule SR 24 Hr TAKE 1 CAPSULE EVERY DAY 90 capsule 3    ATORVASTATIN 10 MG Oral Tab TAKE 1 TABLET EVERY DAY 90 tablet 3    TAMSULOSIN 0.4 MG Oral Cap TAKE 1 CAPSULE EVERY DAY 90 capsule 3    warfarin 2.5 MG Oral Tab TAKE ONE TABLET BY MOUTH 2 DAYS WEEKLY OR AS DIRECTED BY ANTICOAGULATION CLINIC. 30 tablet 3    warfarin 5 MG Oral Tab TAKE ONE TABLET BY MOUTH 5 DAYS WEEKLY OR AS DIRECTED BY ANTICOAGULATION CLINIC. 65 tablet 3    ascorbic acid (VITAMIN C) 500 MG Oral Tab Take 1 tablet (500 mg total) by mouth in the morning.      Cholecalciferol (VITAMIN D) 1000 UNITS Oral Cap Take 1 capsule by mouth in the morning.

## 2025-06-02 NOTE — PATIENT INSTRUCTIONS
Start tylenol over the counter as directed on package. Okay to take Tylenol 1000mg up to three times per day. Do not exceed 3000mg of tylenol per day.    Use over the counter SalonPas 4% lidocaine patch or Tiger balm over the counter lidocaine product as directed on the package.   Use heat and ice as needed and as tolerated.   Continue your home exercise program.   Follow up in clinic as discussed.

## 2025-06-02 NOTE — TELEPHONE ENCOUNTER
New patient made an appt today to evaluate his low back pain. Please enter x-ray orders.    Future Appointments   Date Time Provider Department Center   6/2/2025  2:30 PM Mar Cage APRN [facility]   [facility]  [facility]   6/10/2025  9:30 AM  CT MAIN RM1  CT [facility]   11/20/2025  7:00 AM Eric Neil MD [facility] [address] [facility] [address]

## 2025-06-02 NOTE — PLAN OF CARE
Patient alert and oriented x4. On room air. AFIB on tele, Cardizem gtt infusing and titrate per protocol.Vital signs stable. Continent of bowel and bladder. Right cholecystostomy drain in place. Up with standby assist. No complaints of pain. Plan of care discussed with patient ,all needs addressed. Fall precautions in place and call light within reach.    POC: P.O lasix, d.w, cardizem gtt, iv abx,       Problem: RISK FOR INFECTION - ADULT  Goal: Absence of fever/infection during anticipated neutropenic period  Description: INTERVENTIONS- Monitor WBC- Administer growth factors as ordered- Implement neutropenic guidelines  Outcome: Progressing     Problem: PAIN - ADULT  Goal: Verbalizes/displays adequate comfort level or patient's stated pain goal  Description: INTERVENTIONS:- Encourage pt to monitor pain and request assistance- Assess pain using appropriate pain scale- Administer analgesics based on type and severity of pain and evaluate response- Implement non-pharmacological measures as appropriate and evaluate response- Consider cultural and social influences on pain and pain management- Manage/alleviate anxiety- Utilize distraction and/or relaxation techniques- Monitor for opioid side effects- Notify MD/LIP if interventions unsuccessful or patient reports new pain- Anticipate increased pain with activity and pre-medicate as appropriate  Outcome: Progressing     Problem: DISCHARGE PLANNING  Goal: Discharge to home or other facility with appropriate resources  Description: INTERVENTIONS:- Identify barriers to discharge w/pt and caregiver- Include patient/family/discharge partner in discharge planning- Arrange for needed discharge resources and transportation as appropriate- Identify discharge learning needs (meds, wound care, etc)- Arrange for interpreters to assist at discharge as needed- Consider post-discharge preferences of patient/family/discharge partner- Complete POLST form as appropriate- Assess patient's  ability to be responsible for managing their own health- Refer to Case Management Department for coordinating discharge planning if the patient needs post-hospital services based on physician/LIP order or complex needs related to functional status, cognitive ability or social support system  Outcome: Progressing      no

## 2025-06-04 NOTE — PAT NURSING NOTE
PreOp Instructions     You are scheduled for: an Interventional Radiology Procedure     Date of Procedure: 06/10/25. CHECK IN AT 1:00 PM     Diet Instructions: Do not eat or drink anything for 6 hours before the procedure. NO FOOD OR WATER AFTER 8:00 AM     Medications: Medications you are allowed to take can be taken with a sip of water the morning of your procedure     Do not take the following Blood Thinner(s): STOP WARFARIN TODAY     Other Medications: HOLD FUROSEMIDE MORNING OF PROCEDURE     Driving After Procedure: Sedation will be given so you WILL NOT be able to drive home. You will need a responsible adult  to drive you home. You can NOT take uber or taxi unless approved by your physician in advance.     Discharge Teaching: Your nurse will give you specific instructions before discharge, Most people can resume normal activities in 2-3 days, Any questions, please call the physician's office

## 2025-06-10 ENCOUNTER — HOSPITAL ENCOUNTER (OUTPATIENT)
Dept: INTERVENTIONAL RADIOLOGY/VASCULAR | Facility: HOSPITAL | Age: 80
Discharge: HOME | End: 2025-06-10
Attending: SURGERY | Admitting: SURGERY
Payer: MEDICARE

## 2025-06-10 ENCOUNTER — APPOINTMENT (OUTPATIENT)
Dept: CT IMAGING | Facility: HOSPITAL | Age: 80
End: 2025-06-10
Attending: SURGERY
Payer: MEDICARE

## 2025-06-10 ENCOUNTER — APPOINTMENT (OUTPATIENT)
Dept: LAB | Facility: HOSPITAL | Age: 80
End: 2025-06-10
Attending: SURGERY
Payer: MEDICARE

## 2025-06-10 VITALS
HEART RATE: 73 BPM | TEMPERATURE: 98 F | OXYGEN SATURATION: 96 % | SYSTOLIC BLOOD PRESSURE: 106 MMHG | RESPIRATION RATE: 21 BRPM | DIASTOLIC BLOOD PRESSURE: 68 MMHG

## 2025-06-10 DIAGNOSIS — K81.9 CHOLECYSTITIS: ICD-10-CM

## 2025-06-10 LAB — INR: 1.4 (ref 0.8–1.3)

## 2025-06-10 PROCEDURE — 85610 PROTHROMBIN TIME: CPT | Performed by: RADIOLOGY

## 2025-06-10 PROCEDURE — 47531 INJECTION FOR CHOLANGIOGRAM: CPT | Performed by: RADIOLOGY

## 2025-06-10 RX ORDER — MIDAZOLAM HYDROCHLORIDE 1 MG/ML
INJECTION INTRAMUSCULAR; INTRAVENOUS
Status: DISCONTINUED
Start: 2025-06-10 | End: 2025-06-10 | Stop reason: WASHOUT

## 2025-06-10 RX ORDER — LIDOCAINE HYDROCHLORIDE 10 MG/ML
INJECTION, SOLUTION INFILTRATION; PERINEURAL
Status: COMPLETED
Start: 2025-06-10 | End: 2025-06-10

## 2025-06-10 RX ORDER — SODIUM CHLORIDE 9 MG/ML
INJECTION, SOLUTION INTRAVENOUS CONTINUOUS
Status: DISCONTINUED | OUTPATIENT
Start: 2025-06-10 | End: 2025-06-10

## 2025-06-10 RX ORDER — IOPAMIDOL 612 MG/ML
30 INJECTION, SOLUTION INTRAVASCULAR
Status: COMPLETED | OUTPATIENT
Start: 2025-06-10 | End: 2025-06-10

## 2025-06-10 RX ORDER — CEFAZOLIN SODIUM 1 G/3ML
INJECTION, POWDER, FOR SOLUTION INTRAMUSCULAR; INTRAVENOUS
Status: COMPLETED
Start: 2025-06-10 | End: 2025-06-10

## 2025-06-10 RX ADMIN — IOPAMIDOL 10 ML: 612 INJECTION, SOLUTION INTRAVASCULAR at 14:24:00

## 2025-06-10 NOTE — DISCHARGE INSTRUCTIONS
Keep the drain site clean and dry, change dressing daily until site is healed.     If you have a fever >101 degrees, chills, redness, swelling, thick yellow drainage and/or a foul smell coming from the drain site call your physician immediately.

## 2025-06-10 NOTE — PROGRESS NOTES
Pt s/p heath tube removal. Pt received no sedation. Right upper dressing site soft with no bleeding noted. Discharge instructions reviewed with patient, copy given and all questions answered. IV removed. Pt discharged home with all belongings.

## 2025-06-10 NOTE — PROCEDURES
Pt seen recently by Dr. Dias.  Doing well.  No t sx candidate.  Options discussed and referred for heath check.  Patent CD.  D/w pt who wished to have tube removed.  Removed uneventfully.  Comp-none.

## 2025-06-16 NOTE — H&P
[facility]   part of [facility]   History & Physical    Jared Nicole Patient Status:  Outpatient    1945 MRN [id number]   Location [facility] INTERVENTIONAL SUITES Attending No att. providers found   Hosp Day # 0 PCP Eric Neil MD     Admitting Diagnosis:   Referred for tabby tube check and possible removal.    History of Present Illness:   Cholecystitis s/p perc drain.  Not sx candidate.    History   Past Medical History:  Past Medical History[1]    Past Surgical History:  Past Surgical History[2]    Social History:  Social History     Tobacco Use    Smoking status: Former     Current packs/day: 0.00     Average packs/day: 1 pack/day for 20.0 years (20.0 ttl pk-yrs)     Types: Cigarettes     Start date: 1962     Quit date: 1982     Years since quittin.4    Smokeless tobacco: Never   Substance Use Topics    Alcohol use: Yes     Alcohol/week: 0.0 - 2.0 standard drinks of alcohol     Comment: 0-1 beer/week        Family History:  Family History[3]    Allergies/Medications:   Allergies:  Allergies[4]    Medications:  Medications - Current[5]    Physical Exam & Review of Systems:   Physical Exam:    /68 (BP Location: Right arm)   Pulse 73   Temp 97.9 °F (36.6 °C) (Temporal)   Resp 21   SpO2 96%     General: NAD  Neck: No JVD  Lungs: CTA bilat  Heart: RRR, S1, S2  Abdomen: Soft, NT/ND, BS+x4  Extremities: Warm, dry, no LE edema bilat  Pulses: 2+ bilat DP    Results:   Labs:  No results for input(s): \"RBC\", \"HGB\", \"HCT\", \"MCV\", \"MCH\", \"MCHC\", \"RDW\", \"NEPRELIM\", \"WBC\", \"PLT\" in the last 168 hours.  Recent Labs   Lab 06/10/25  1628   INR 1.4*     No results for input(s): \"GLU\", \"BUN\", \"CREATSERUM\", \"GFRAA\", \"GFRNAA\", \"CA\", \"NA\", \"K\", \"CL\", \"CO2\" in the last 168 hours.    Assessment/Plan:   Impression: Tabby tube check poss removal.    Recommendations: Outpatient procedure.    Jefferson Real MD  2025  9:18 AM           [1]   Past Medical History:   Anemia     Arrhythmia    A-FIB    Atrial fibrillation (HCC)    Cancer (HCC)    SKIN    CHF (congestive heart failure) (HCC)    mild     Colitis    DIVERTICULITIS    Colonic diverticular abscess    Congestive heart disease (HCC)    Diverticulosis of intestine    High blood pressure    High cholesterol    Hx musculoskletl dis NEC    BACK/DISC ISSUES    Long term (current) use of anticoagulants    Mitral regurgitation    Mild to moderate     Mitral valve prolapse    Monitoring for long-term anticoagulant use    Other and unspecified hyperlipidemia    Perforated bowel (HCC)    Prediabetes    Skin cancer    SOB (shortness of breath)    Unspecified essential hypertension    Visual impairment   [2]   Past Surgical History:  Procedure Laterality Date    Cataract  4 years ago right&left    Colectomy      Colon surgery      Colonoscopy  04/01/2016    No polyps/inflammation noted, moderate to severe diverticulosis. Fecal transplant completed.    Colonoscopy  4 years ago    Colostomy      with reversal 2012    Laparoscopic cholecystectomy  2025    Other surgical history Left 01/01/2001    eye surgery, detached retina    Other surgical history N/A 04/12/2016    Procedure: COLONOSCOPY W/FECAL MICROBIOTA TRANSPLANT;  Surgeon: Dave Valle MD;  Location:  ENDOSCOPY    Skin surgery  Last year   [3]   Family History  Problem Relation Age of Onset    Lung Disorder Other         COPD, Family History of     Cancer Mother         Colon, Family History of     Colon Cancer Mother     Pulmonary Disease Father    [4] No Known Allergies  [5]   Current Outpatient Medications:     furosemide 40 MG Oral Tab, Take 1 tablet (40 mg total) by mouth daily., Disp: , Rfl:     Metoprolol Tartrate 75 MG Oral Tab, Take 75 mg by mouth 2x Daily(Beta Blocker)., Disp: 180 tablet, Rfl: 1    DILTIAZEM  MG Oral Capsule SR 24 Hr, TAKE 1 CAPSULE EVERY DAY, Disp: 90 capsule, Rfl: 3    ATORVASTATIN 10 MG Oral Tab, TAKE 1 TABLET EVERY DAY, Disp: 90 tablet, Rfl:  3    TAMSULOSIN 0.4 MG Oral Cap, TAKE 1 CAPSULE EVERY DAY, Disp: 90 capsule, Rfl: 3    warfarin 2.5 MG Oral Tab, TAKE ONE TABLET BY MOUTH 2 DAYS WEEKLY OR AS DIRECTED BY ANTICOAGULATION CLINIC., Disp: 30 tablet, Rfl: 3    warfarin 5 MG Oral Tab, TAKE ONE TABLET BY MOUTH 5 DAYS WEEKLY OR AS DIRECTED BY ANTICOAGULATION CLINIC., Disp: 65 tablet, Rfl: 3    ascorbic acid (VITAMIN C) 500 MG Oral Tab, Take 1 tablet (500 mg total) by mouth in the morning., Disp: , Rfl:     Cholecalciferol (VITAMIN D) 1000 UNITS Oral Cap, Take 1 capsule by mouth in the morning., Disp: , Rfl:     methylPREDNISolone (MEDROL) 4 MG Oral Tablet Therapy Pack, As directed., Disp: 1 each, Rfl: 0

## 2025-06-23 ENCOUNTER — TELEPHONE (OUTPATIENT)
Dept: PHYSICAL THERAPY | Facility: HOSPITAL | Age: 80
End: 2025-06-23

## 2025-06-30 ENCOUNTER — OFFICE VISIT (OUTPATIENT)
Dept: PHYSICAL THERAPY | Age: 80
End: 2025-06-30
Attending: NURSE PRACTITIONER
Payer: MEDICARE

## 2025-06-30 DIAGNOSIS — G89.29 CHRONIC BILATERAL LOW BACK PAIN WITH RIGHT-SIDED SCIATICA: Primary | ICD-10-CM

## 2025-06-30 DIAGNOSIS — M54.41 CHRONIC BILATERAL LOW BACK PAIN WITH RIGHT-SIDED SCIATICA: Primary | ICD-10-CM

## 2025-06-30 PROCEDURE — 97110 THERAPEUTIC EXERCISES: CPT

## 2025-06-30 PROCEDURE — 97162 PT EVAL MOD COMPLEX 30 MIN: CPT

## 2025-06-30 NOTE — PROGRESS NOTES
SPINE EVALUATION:     Diagnosis:   Chronic bilateral low back pain with right-sided sciatica (G89.29,M54.41) Patient:  Jared Nicole (79 year old, male)        Referring Provider: Mar Cage  Today's Date   6/30/2025    Precautions:      Date of Evaluation: 06/30/25  Next MD visit: No data recorded  Date of Surgery: No data recorded     PATIENT SUMMARY     Summary of chief complaints: low back pain  History of current condition: Patient states in April 2025 (2 month ago) patient had back pain the day after lifting a TV. Patient states his pain was originally on the right leg (low back, front of leg). Then patient was hospitalized a month ago and now the pain is on the left side (low back, down the front of the leg). Over the last few weeks the pain has been switching from the right and left side. Pain does not go past the knee.   Pain level: current 2 /10 (right low back), at best 0 /10 (walking), at worst 10 /10 (sit to stand)  Description of symptoms: pain was improving until a week ago, now pain has gotten back to what it was.   Occupation: Retired PTA   Leisure activities/Hobbies: Plays card games   Prior level of function: History of microdiscectomy 35 years ago. Has had one fall 10 years ago. Does not use an assistive device.  Current limitations: sit to stand painful; lifting  Pt goals: to get back prior level of function  Red flag signs/symptoms: Pt denies dizziness, drop attacks, dysphagia, dysarthria, diplopia; Pt denies changes in bowel/bladder function, saddle anesthesia; Pt denies pain that wakes in sleep, fever, recent trauma, history of CA, pain unchanged with movement/activity    Past medical history was reviewed with Jared.  Significant findings include:    Imaging/Tests:     Jared  has a past medical history of Anemia (01/23/2017), Arrhythmia, Atrial fibrillation (HCC), Cancer (HCC), CHF (congestive heart failure) (HCC), Colitis, Colonic diverticular abscess, Congestive heart  disease (HCC), Diverticulosis of intestine, High blood pressure, High cholesterol, musculoskletl dis NEC, Long term (current) use of anticoagulants, Mitral regurgitation, Mitral valve prolapse, Monitoring for long-term anticoagulant use (11/05/2015), Other and unspecified hyperlipidemia, Perforated bowel (HCC), Prediabetes (10/09/2014), Skin cancer, SOB (shortness of breath), Unspecified essential hypertension, and Visual impairment.  He  has a past surgical history that includes colostomy; Colon surgery; Colectomy; other surgical history (Left, 01/01/2001); colonoscopy (04/01/2016); other surgical history (N/A, 04/12/2016); cataract (4 years ago right&left); colonoscopy (4 years ago); skin surgery (Last year); and Laparoscopic cholecystectomy (2025).    ASSESSMENT  Jared presents to physical therapy evaluation with primary c/o low back pain. The results of the objective tests and measures show decreased lumbar ROM and lower extremity strength. Functional deficits include but are not limited to sit to stand painful; lifting. Signs and symptoms are consistent with diagnosis of Chronic bilateral low back pain with right-sided sciatica (G89.29,M54.41). Pt and PT discussed evaluation findings, pathology, POC and HEP.  Pt voiced understanding and performs HEP correctly without reported pain. Skilled Physical Therapy is medically necessary to address the above impairments and reach functional goals.    OBJECTIVE:      Musculoskeletal:  Observation/Posture: stooped forward posture; forward head posture (slouched sitting posture)   Special tests: Repeated motion test:  Repeated motion testing and Response (Lumbar):  Baseline: no pain in standing  Repeated flexion in sitting x 10 no pain  Prone laying x 2 minutes - no pain  Prone on elbows x 2 minutes - no pain during, had mild back pain afterwards which subsided    ROM and Strength:  (* denotes performed with pain)  Trunk ROM     Flex mod loss     Ext mod loss   ,   Hip    MMT (-/5)    R L     Flex (L2) 4 4     Abd 4- 4-    ,   Knee   ROM MMT (-/5)    R L R L     Flex WNL WNL 5 5     Ext (L3) WNL WNL 5 5       Flexibility:  LE Flexibility R L     Hip Flexor mod restricted mod restricted     Hamstrings mod restricted mod restricted     Quads min restricted min restricted       Balance and Functional Mobility:  Gait: pt ambulates on level ground with stooped posture/forward lean; decreased step length.     Today's Treatment and Response:   Pt education was provided on exam findings, treatment diagnosis, treatment plan, expectations, and prognosis.    Today's Treatment       6/30/2025   Spine Treatment   Therapeutic Exercise Repeated flexion in seated 2 x 10  Patient education on HEP, plan of care x 6 minutes    Therapeutic Exercise Minutes 8   Evaluation Minutes 30   Total Time Of Timed Procedures 8   Total Time Of Service-Based Procedures 30   Total Treatment Time 38   HEP Repeated flexion in seated x 10 (4 times a day)        Patient was instructed in and issued a HEP for: Repeated flexion in seated x 10 (4 times a day)    Charges:  PT EVAL:  , 1 Eval, 1 TE  In agreement with evaluation findings and clinical rationale, this evaluation involved MODERATE COMPLEXITY decision making due to 1-2 personal factors/comorbidities, 3 or more body structures involved/activity limitations, and evolving symptoms as documented in the evaluation.                                                                         PLAN OF CARE:      Goals: (to be met in 10 visits)    Not Met Progress Toward Partially Met Met   Pt will demonstrate good understanding of proper posture and body mechanics to decrease pain and improve spinal safety. [] [] [] []   Pt will improve lumbar spine AROM flexion to have min loss to allow increase ease with bending forward to don shoes. [] [] [] []   Pt will report improved symptom centralization and absence of radicular symptoms for 3 consecutive days to improve function with  ADLs. [] [] [] []   Pt will be able to complete sit to stand without pain when getting out of a chair. [] [] [] []   Pt will be independent and compliant with comprehensive HEP to maintain progress achieved in PT [] [] [] []          Frequency / Duration: Patient will be seen 2x/week or a total of 10  visits over a 90 day period. Treatment will include: Gait training; Manual Therapy; Mechanical Traction; Therapeutic Activities; Therapeutic Exercise; Neuromuscular Re-education; Home Exercise Program instruction    Education or treatment limitation: None   Rehab Potential: good     Oswestry Disability Index Score  Score: (Patient-Rptd) 36 % (6/29/2025  3:46 PM)      Patient/Family/Caregiver was advised of these findings, precautions, and treatment options and has agreed to actively participate in planning and for this course of care.    Thank you for your referral. Please co-sign or sign and return this letter via fax as soon as possible to [phone]. If you have any questions, please contact me at Dept: [phone]    Sincerely,  Electronically signed by therapist: Julia Davila, PT, DPT, Cert.MDT  Physician's certification required: Yes  I certify the need for these services furnished under this plan of treatment and while under my care.    X___________________________________________________ Date____________________    Certification From: 6/30/2025  To: 9/28/2025

## 2025-07-07 ENCOUNTER — OFFICE VISIT (OUTPATIENT)
Dept: PHYSICAL THERAPY | Age: 80
End: 2025-07-07
Attending: NURSE PRACTITIONER
Payer: MEDICARE

## 2025-07-07 PROCEDURE — 97110 THERAPEUTIC EXERCISES: CPT

## 2025-07-07 NOTE — PROGRESS NOTES
Patient: Jared Nicole (79 year old, male) Referring Provider:  Insurance:   Diagnosis: Chronic bilateral low back pain with right-sided sciatica (G89.29,M54.41) Mar VASQUEZ   Date of Surgery: No data recorded Next MD visit:  N/A   Precautions:    No data recorded Referral Information:    Date of Evaluation: Req: 8, Auth: 8, Exp: 9/30/2025 06/30/25 POC Auth Visits:          Today's Date   7/7/2025    Subjective  Patient states that he is feeling about the same. Does  feel that the flexion exercises are helpful but the pain is as bad as it has been. Yesterday did not feel too good.       Pain: 0/10     Objective     Assessment  Initiated gentle lumbar ROM and strengthening to allow for improved flexibility and strength to improve functional mobility. Patient unable to complete bridges due to strength.    Goals (to be met in 10 visits)      Not Met Progress Toward Partially Met Met   Pt will demonstrate good understanding of proper posture and body mechanics to decrease pain and improve spinal safety. [] [] [] []   Pt will improve lumbar spine AROM flexion to have min loss to allow increase ease with bending forward to don shoes. [] [] [] []   Pt will report improved symptom centralization and absence of radicular symptoms for 3 consecutive days to improve function with ADLs. [] [] [] []   Pt will be able to complete sit to stand without pain when getting out of a chair. [] [] [] []   Pt will be independent and compliant with comprehensive HEP to maintain progress achieved in PT [] [] [] []              Plan  Continue per POC. Progress as tolerated.    Treatment Last 4 Visits  Treatment Day: 2       6/30/2025 7/7/2025   Spine Treatment   Therapeutic Exercise Repeated flexion in seated 2 x 10  Patient education on HEP, plan of care x 6 minutes  NuStep level 4 x 5 minutes  Lower trunk rotation x 20  Supine lumbar flexion 2 x 10  TrA bracing 5 second hold x 20  Single leg knee to chest 2 x 10  B  SLR 2 x 10 B  Seated lumbar flexion x 10  Seated lumbar flexion with chair OP x 10   Therapeutic Exercise Minutes 8 40   Evaluation Minutes 30    Total Time Of Timed Procedures 8 40   Total Time Of Service-Based Procedures 30 0   Total Treatment Time 38 40   HEP Repeated flexion in seated x 10 (4 times a day)         HEP  Repeated flexion in seated x 10 (4 times a day)    Charges  3TE

## 2025-07-11 ENCOUNTER — OFFICE VISIT (OUTPATIENT)
Dept: PHYSICAL THERAPY | Age: 80
End: 2025-07-11
Attending: NURSE PRACTITIONER
Payer: MEDICARE

## 2025-07-11 PROCEDURE — 97110 THERAPEUTIC EXERCISES: CPT

## 2025-07-11 NOTE — PROGRESS NOTES
Patient: Jared Nicole (79 year old, male) Referring Provider:  Insurance:   Diagnosis: Chronic bilateral low back pain with right-sided sciatica (G89.29,M54.41) Mar VASQUEZ   Date of Surgery: No data recorded Next MD visit:  N/A   Precautions:    No data recorded Referral Information:    Date of Evaluation: Req: 8, Auth: 8, Exp: 9/30/2025 06/30/25 POC Auth Visits:          Today's Date   7/11/2025    Subjective  \" My lower back pain feels the same since the start of PT . My curret pain in my lower back 2/10 and it gets worse after prolonged sitting .\"       Pain: 2/10     Objective               Assessment  Incorporated TrA activation with hip strength in supine and in WB to progress distal trunk and hips functional strength for improved stability in order to promote more upright posture with sustained seated and standing activites .    Goals (to be met in 10 visits)     Not Met Progress Toward Partially Met Met    Pt will demonstrate good understanding of proper posture and body mechanics to decrease pain and improve spinal safety. []  []  []  []    Pt will improve lumbar spine AROM flexion to have min loss to allow increase ease with bending forward to don shoes. []  []  []  []    Pt will report improved symptom centralization and absence of radicular symptoms for 3 consecutive days to improve function with ADLs. []  []  []  []    Pt will be able to complete sit to stand without pain when getting out of a chair. []  []  []  []    Pt will be independent and compliant with comprehensive HEP to maintain progress achieved in PT []  []  []  []                Plan  Continue PT as per current POC    Treatment Last 4 Visits  Treatment Day: 3       6/30/2025 7/7/2025 7/11/2025   Spine Treatment   Therapeutic Exercise Repeated flexion in seated 2 x 10  Patient education on HEP, plan of care x 6 minutes  NuStep level 4 x 5 minutes  Lower trunk rotation x 20  Supine lumbar flexion 2 x 10  TrA  bracing 5 second hold x 20  Single leg knee to chest 2 x 10 B  SLR 2 x 10 B  Seated lumbar flexion x 10  Seated lumbar flexion with chair OP x 10 NU step x 6 min , level 5  Seated :  Lumbar flexion stretch with hands on SB 2 x 10   Supine :  LTR stretch x 10 x 10 sec hold   SKTC stretch 2 x 10   PPT AROM 2 x 10   TrA activation with :  Adductors activation 2 x 10   Abductors activation 2 x 10   Alt marches 2 x 10   Standing :  Side stepping in // bars with yellow TB 2 rounds   Therapeutic Exercise Minutes 8 40 45   Evaluation Minutes 30     Total Time Of Timed Procedures 8 40 45   Total Time Of Service-Based Procedures 30 0 0   Total Treatment Time 38 40 45   HEP Repeated flexion in seated x 10 (4 times a day)          HEP  Repeated flexion in seated x 10 (4 times a day)    Charges  there - ex x 3

## 2025-07-14 ENCOUNTER — OFFICE VISIT (OUTPATIENT)
Dept: PHYSICAL THERAPY | Age: 80
End: 2025-07-14
Attending: NURSE PRACTITIONER
Payer: MEDICARE

## 2025-07-14 PROCEDURE — 97110 THERAPEUTIC EXERCISES: CPT

## 2025-07-14 NOTE — PROGRESS NOTES
Patient: Jared Nicole (79 year old, male) Referring Provider:  Insurance:   Diagnosis: Chronic bilateral low back pain with right-sided sciatica (G89.29,M54.41) Mar VASQEUZ   Date of Surgery: No data recorded Next MD visit:  N/A   Precautions:    No data recorded Referral Information:    Date of Evaluation: Req: 8, Auth: 8, Exp: 9/30/2025 06/30/25 POC Auth Visits:          Today's Date   7/14/2025    Subjective  Patient states he is doing the same. Does feel better with movement. Pain with sit to stand. Felt worse after the session.       Pain: 3/10     Objective            Assessment  Patient to discontinue flexion at this time and trial standing lumbar extension over fulcrum. patient understands to discontinue if there is any pain.    Goals (to be met in 10 visits)      Not Met Progress Toward Partially Met Met   Pt will demonstrate good understanding of proper posture and body mechanics to decrease pain and improve spinal safety. [] [] [] []   Pt will improve lumbar spine AROM flexion to have min loss to allow increase ease with bending forward to don shoes. [] [] [] []   Pt will report improved symptom centralization and absence of radicular symptoms for 3 consecutive days to improve function with ADLs. [] [] [] []   Pt will be able to complete sit to stand without pain when getting out of a chair. [] [] [] []   Pt will be independent and compliant with comprehensive HEP to maintain progress achieved in PT [] [] [] []                  Plan  Continue PT as per current POC    Treatment Last 4 Visits  Treatment Day: 4       6/30/2025 7/7/2025 7/11/2025 7/14/2025   Spine Treatment   Therapeutic Exercise Repeated flexion in seated 2 x 10  Patient education on HEP, plan of care x 6 minutes  NuStep level 4 x 5 minutes  Lower trunk rotation x 20  Supine lumbar flexion 2 x 10  TrA bracing 5 second hold x 20  Single leg knee to chest 2 x 10 B  SLR 2 x 10 B  Seated lumbar flexion x 10  Seated  lumbar flexion with chair OP x 10 NU step x 6 min , level 5  Seated :  Lumbar flexion stretch with hands on SB 2 x 10   Supine :  LTR stretch x 10 x 10 sec hold   SKTC stretch 2 x 10   PPT AROM 2 x 10   TrA activation with :  Adductors activation 2 x 10   Abductors activation 2 x 10   Alt marches 2 x 10   Standing :  Side stepping in // bars with yellow TB 2 rounds NU step x 6 min , level 5   Calf stretch on board 3  x 30 seconds  Standing lumbar extension over fulcrum x 10  Supine SLR x 20 B  Sidelying hip ER x 20 B  TrA isometrics x 20  LTR       Therapeutic Exercise Minutes 8 40 45 40   Evaluation Minutes 30      Total Time Of Timed Procedures 8 40 45 40   Total Time Of Service-Based Procedures 30 0 0 0   Total Treatment Time 38 40 45 40   HEP Repeated flexion in seated x 10 (4 times a day)   Standing lumbar over fulcrum x 10 (4 times a day)        HEP  Standing lumbar over fulcrum x 10 (4 times a day)    Charges  3TE

## 2025-07-21 ENCOUNTER — OFFICE VISIT (OUTPATIENT)
Dept: PHYSICAL THERAPY | Age: 80
End: 2025-07-21
Attending: NURSE PRACTITIONER
Payer: MEDICARE

## 2025-07-21 PROCEDURE — 97110 THERAPEUTIC EXERCISES: CPT

## 2025-07-21 NOTE — PROGRESS NOTES
Patient: Jared Nicole (79 year old, male) Referring Provider:  Insurance:   Diagnosis: Chronic bilateral low back pain with right-sided sciatica (G89.29,M54.41) Mar VASQUEZ   Date of Surgery: No data recorded Next MD visit:  N/A   Precautions:    No data recorded Referral Information:    Date of Evaluation: Req: 8, Auth: 8, Exp: 9/30/2025 06/30/25 POC Auth Visits:          Today's Date   7/21/2025    Subjective  Patient states his back is doing much better. Patient states doing the back extension in standing has helped his pain.       Pain: 0/10     Objective            Assessment  Patient demonstrates directional preference of lumbar extension as patient has decreased pain. Upon review of HEP, patient reported he is still doing lumbar flexion exercises. Had discussion with patient regarding stopping lumbar flexion for a short time to see the full effect of lumbar extension. Patient verbalized complaince.    Goals (to be met in 10 visits)      Not Met Progress Toward Partially Met Met   Pt will demonstrate good understanding of proper posture and body mechanics to decrease pain and improve spinal safety. [] [] [] []   Pt will improve lumbar spine AROM flexion to have min loss to allow increase ease with bending forward to don shoes. [] [] [] []   Pt will report improved symptom centralization and absence of radicular symptoms for 3 consecutive days to improve function with ADLs. [] [] [] []   Pt will be able to complete sit to stand without pain when getting out of a chair. [] [] [] []   Pt will be independent and compliant with comprehensive HEP to maintain progress achieved in PT [] [] [] []                      Plan  Continue PT as per current POC    Treatment Last 4 Visits  Treatment Day: 5 7/7/2025 7/11/2025 7/14/2025 7/21/2025   Spine Treatment   Therapeutic Exercise NuStep level 4 x 5 minutes  Lower trunk rotation x 20  Supine lumbar flexion 2 x 10  TrA bracing 5 second hold  x 20  Single leg knee to chest 2 x 10 B  SLR 2 x 10 B  Seated lumbar flexion x 10  Seated lumbar flexion with chair OP x 10 NU step x 6 min , level 5  Seated :  Lumbar flexion stretch with hands on SB 2 x 10   Supine :  LTR stretch x 10 x 10 sec hold   SKTC stretch 2 x 10   PPT AROM 2 x 10   TrA activation with :  Adductors activation 2 x 10   Abductors activation 2 x 10   Alt marches 2 x 10   Standing :  Side stepping in // bars with yellow TB 2 rounds NU step x 6 min , level 5   Calf stretch on board 3  x 30 seconds  Standing lumbar extension over fulcrum x 10  Supine SLR x 20 B  Sidelying hip ER x 20 B  TrA isometrics x 20  LTR     NU step x 6 min , level 5   Calf stretch on board 3  x 30 seconds   Standing lumbar extension over fulcrum x 10   Supine SLR x 20 B   Sidelying hip abduction x 20 B  LTR x 20     Therapeutic Exercise Minutes 40 45 40 40   Total Time Of Timed Procedures 40 45 40 40   Total Time Of Service-Based Procedures 0 0 0 0   Total Treatment Time 40 45 40 40   HEP   Standing lumbar over fulcrum x 10 (4 times a day)         HEP  Standing lumbar over fulcrum x 10 (4 times a day)    Charges  3TE

## 2025-07-25 ENCOUNTER — OFFICE VISIT (OUTPATIENT)
Dept: PHYSICAL THERAPY | Age: 80
End: 2025-07-25
Attending: NURSE PRACTITIONER
Payer: MEDICARE

## 2025-07-25 PROCEDURE — 97110 THERAPEUTIC EXERCISES: CPT

## 2025-07-25 NOTE — PROGRESS NOTES
Patient: Jared Nicole (79 year old, male) Referring Provider:  Insurance:   Diagnosis: Chronic bilateral low back pain with right-sided sciatica (G89.29,M54.41) Mar VASQUEZ   Date of Surgery: No data recorded Next MD visit:  N/A   Precautions:    No data recorded Referral Information:    Date of Evaluation: Req: 8, Auth: 8, Exp: 9/30/2025 06/30/25 POC Auth Visits:          Today's Date   7/25/2025    Subjective  Pt denies pain currently; states the hardest thing is getting up from sitting, also a.m. pain, which improves with walking.       Pain: 0/10        Assessment  Pt continues to respond well to lumbar extension ex's; reviewed benefit of leaning back against fulcrum for HEP for isolated mobility.  Pt requires min cues for LE ex's for targeting muscle groups.  Pt did report some increased LBP post session, stating 'it's just 45 min of exercise I'm not used to'.  'By the time I get to the car, I'll be fine.'  Pt had increased pain with sit to stand after supine ex's, but no other c/o pain during exercise today.    Goals (to be met in 10 visits)      Not Met Progress Toward Partially Met Met   Pt will demonstrate good understanding of proper posture and body mechanics to decrease pain and improve spinal safety. [] [] [] []   Pt will improve lumbar spine AROM flexion to have min loss to allow increase ease with bending forward to don shoes. [] [] [] []   Pt will report improved symptom centralization and absence of radicular symptoms for 3 consecutive days to improve function with ADLs. [] [] [] []   Pt will be able to complete sit to stand without pain when getting out of a chair. [] [] [] []   Pt will be independent and compliant with comprehensive HEP to maintain progress achieved in PT [] [] [] []                           Plan  Continue PT as per current POC    Treatment Last 4 Visits  Treatment Day: 6       7/11/2025 7/14/2025 7/21/2025 7/25/2025   Spine Treatment   Therapeutic  Exercise NU step x 6 min , level 5  Seated :  Lumbar flexion stretch with hands on SB 2 x 10   Supine :  LTR stretch x 10 x 10 sec hold   SKTC stretch 2 x 10   PPT AROM 2 x 10   TrA activation with :  Adductors activation 2 x 10   Abductors activation 2 x 10   Alt marches 2 x 10   Standing :  Side stepping in // bars with yellow TB 2 rounds NU step x 6 min , level 5   Calf stretch on board 3  x 30 seconds  Standing lumbar extension over fulcrum x 10  Supine SLR x 20 B  Sidelying hip ER x 20 B  TrA isometrics x 20  LTR     NU step x 6 min , level 5   Calf stretch on board 3  x 30 seconds   Standing lumbar extension over fulcrum x 10   Supine SLR x 20 B   Sidelying hip abduction x 20 B  LTR x 20   NU step x 7 min , level 5  Calf stretch on board 3  x 30 seconds  Standing lumbar extension over fulcrum x 10  Standing lumbar extension at // bars (as pt does at home at countertop)  Supine SLR x 20 B  Passive HS stretch 5x15\"   Seated HS stretch 3x20-30\" ea  Sidelying hip abduction x 20 B  LTR x 20           Therapeutic Exercise Minutes 45 40 40 42   Total Time Of Timed Procedures 45 40 40 42   Total Time Of Service-Based Procedures 0 0 0 0   Total Treatment Time 45 40 40 42   HEP  Standing lumbar over fulcrum x 10 (4 times a day)          HEP  Standing lumbar over fulcrum x 10 (4 times a day)    Charges   Therex 3

## 2025-07-28 ENCOUNTER — OFFICE VISIT (OUTPATIENT)
Dept: PHYSICAL THERAPY | Age: 80
End: 2025-07-28
Attending: NURSE PRACTITIONER
Payer: MEDICARE

## 2025-07-28 PROCEDURE — 97110 THERAPEUTIC EXERCISES: CPT

## 2025-07-28 NOTE — PROGRESS NOTES
Patient: Jared Nicole (79 year old, male) Referring Provider:  Insurance:   Diagnosis: Chronic bilateral low back pain with right-sided sciatica (G89.29,M54.41) Mar VASQUEZ   Date of Surgery: No data recorded Next MD visit:  N/A   Precautions:    No data recorded Referral Information:    Date of Evaluation: Req: 8, Auth: 8, Exp: 9/30/2025 06/30/25 POC Auth Visits:          Today's Date   7/28/2025    Subjective  Patient states he ate something bad this weekend and was throwing up this weekend. Feels better now. Back was doing pretty good before that. Does not have pain anymore. Getting up and down is tough.       Pain: 0/10     Objective             Assessment  Patient continues to respond well to directional preference of lumbar extension. Patient able to complete bridges today likley due to improved mobility as previously he was not able to. Focused on strengthening today as patient slightly deconditioned over the weekend.    Goals (to be met in 10 visits)      Not Met Progress Toward Partially Met Met   Pt will demonstrate good understanding of proper posture and body mechanics to decrease pain and improve spinal safety. [] [] [] []   Pt will improve lumbar spine AROM flexion to have min loss to allow increase ease with bending forward to don shoes. [] [] [] []   Pt will report improved symptom centralization and absence of radicular symptoms for 3 consecutive days to improve function with ADLs. [] [] [] []   Pt will be able to complete sit to stand without pain when getting out of a chair. [] [] [] []   Pt will be independent and compliant with comprehensive HEP to maintain progress achieved in PT [] [] [] []                          Plan  Likely discharge to HEP next session.    Treatment Last 4 Visits  Treatment Day: 7 7/14/2025 7/21/2025 7/25/2025 7/28/2025   Spine Treatment   Therapeutic Exercise NU step x 6 min , level 5   Calf stretch on board 3  x 30 seconds  Standing  lumbar extension over fulcrum x 10  Supine SLR x 20 B  Sidelying hip ER x 20 B  TrA isometrics x 20  LTR     NU step x 6 min , level 5   Calf stretch on board 3  x 30 seconds   Standing lumbar extension over fulcrum x 10   Supine SLR x 20 B   Sidelying hip abduction x 20 B  LTR x 20   NU step x 7 min , level 5  Calf stretch on board 3  x 30 seconds  Standing lumbar extension over fulcrum x 10  Standing lumbar extension at // bars (as pt does at home at countertop)  Supine SLR x 20 B  Passive HS stretch 5x15\"   Seated HS stretch 3x20-30\" ea  Sidelying hip abduction x 20 B  LTR x 20         NU step x 6 min , level 5   Calf stretch on board 3  x 30 seconds   Standing lumbar extension over fulcrum x 10   Bridges 2 x 10  Supine SLR x 10 B   Sidelying hip abduction x 15 B  LTR x 20  Step up on 6inch step 2 x 10 B  Heel raises x 20       Therapeutic Exercise Minutes 40 40 42 45   Total Time Of Timed Procedures 40 40 42 45   Total Time Of Service-Based Procedures 0 0 0 0   Total Treatment Time 40 40 42 45   HEP Standing lumbar over fulcrum x 10 (4 times a day)           HEP  Standing lumbar over fulcrum x 10 (4 times a day)    Charges  3TE

## 2025-08-01 ENCOUNTER — OFFICE VISIT (OUTPATIENT)
Dept: PHYSICAL THERAPY | Age: 80
End: 2025-08-01
Attending: NURSE PRACTITIONER

## 2025-08-01 PROCEDURE — 97110 THERAPEUTIC EXERCISES: CPT

## 2025-08-05 RX ORDER — TAMSULOSIN HYDROCHLORIDE 0.4 MG/1
0.4 CAPSULE ORAL DAILY
Qty: 90 CAPSULE | Refills: 0 | Status: SHIPPED | OUTPATIENT
Start: 2025-08-05

## (undated) DEVICE — FUSION TITAN BILIARY DILATION BALLOON 3.2MM: Brand: FUSION TITAN

## (undated) DEVICE — BITEBLOCK ENDOSCP 60FR MAXI STRP

## (undated) DEVICE — RETRIEVAL BALLOON CATHETER: Brand: EXTRACTOR™ PRO RX

## (undated) DEVICE — 1200CC GUARDIAN II: Brand: GUARDIAN

## (undated) DEVICE — KIT VLV 5 PC AIR H2O SUCT BX ENDOGATOR CONN

## (undated) DEVICE — OASIS, ONE ACTION STENT INTRODUCTION SYSTEM: Brand: OASIS

## (undated) DEVICE — 10FT COMBINED O2 DELIVERY/CO2 MONITORING. FILTER WITH MICROSTREAM TYPE LUER: Brand: DUAL ADULT NASAL CANNULA

## (undated) DEVICE — V2 SPECIMEN COLLECTION MANIFOLD KIT: Brand: NEPTUNE

## (undated) DEVICE — 3M™ RED DOT™ MONITORING ELECTRODE WITH FOAM TAPE AND STICKY GEL, 50/BAG, 20/CASE, 72/PLT 2570: Brand: RED DOT™

## (undated) DEVICE — FIRSTSTEP 200ML READY2USE GEMI

## (undated) DEVICE — SINGLE USE DISTAL COVER MAJ-2315: Brand: SINGLE USE DISTAL COVER

## (undated) DEVICE — DEVICE INFL 60ML 15ATM PRSS COOK SPHR

## (undated) DEVICE — ACROBAT 2 CALIBRATED TIP WIRE GUIDE: Brand: ACROBAT

## (undated) DEVICE — LASSO POLYPECTOMY SNARE: Brand: LASSO

## (undated) DEVICE — SNAPLOC WIRE GUIDE LOCKING DEVICE: Brand: SNAPLOC

## (undated) DEVICE — GLOVE,SURG,SENSICARE,ALOE,LF,PF,7: Brand: MEDLINE

## (undated) DEVICE — KIT CUSTOM ENDOPROCEDURE STERIS

## (undated) DEVICE — CANNULATING SPHINCTEROTOME: Brand: AUTOTOME™ RX 44

## (undated) DEVICE — WIREGUIDED RETRIEVAL BASKET: Brand: TRAPEZOID RX

## (undated) NOTE — LETTER
[facility]  [address] [address]  [phone]    Blood Transfusion Consent    In the course of your treatment, it may become necessary to administer a transfusion of blood or blood components. This form provides basic information concerning this procedure and, if signed by you, authorizes its administration. By signing this form, you agree that all of your questions about the administration of blood or blood products have been answered by the ordering medical professional or designee.    Description of Procedure  Blood is introduced into one of your veins, commonly in the arm, using a sterilized disposable needle. The amount of blood transfused, and whether the transfusion will be of blood or blood components is a judgement the physician will make based on your particular needs.    Risks  The transfusion is a common procedure of low risk.  MINOR AND TEMPORARY REACTIONS ARE NOT UNCOMMON, including a slight bruise, swelling or local reaction in the area where the needle pierces your skin, or a nonserious reaction to the transfused material itself, including headache, fever or mild skin reaction, such as rash.  Serious reactions are possible, though very unlikely, and include severe allergic reaction (shock) and destruction (hemolysis) of transfused blood cells.  Infectious diseases which are known to be transmitted by blood transfusion include certain types of viral Hepatitis(liver infection from a virus), Human Immunodeficiency Virus (HIV-1,2) infection, a viral infection known to cause Acquired Immunodeficiency Syndrome (AIDS), as well as certain other bacterial, viral, and parasitic diseases. While a minimal risk of acquiring an infectious disease from transfused blood exists, in accordance with the Federal and State law, all due care has been taken in donor selection and testing to avoid transmission of disease.    Alternatives  If loss of blood poses serious threats during your  treatment, THERE IS NO EFFECTIVE ALTERNATIVE TO BLOOD TRANSFUSION. However, if you have any further questions on this matter, your provider will fully explain the alternatives to you if it has not already been done.    I, ______________________________, have read/had read to me the above. I understand the matters bearing on the decision whether or not to authorize a transfusion of blood or blood components. I have no questions which have not been answered to my full satisfaction. I hereby consent to such transfusion as my physician may deem necessary or advisable in the course of my treatment.    ______________________________________________                    ___________________________  (Signature of Patient or Responsible party in case of minor,                 (Printed Name of Patient or incompetent, or unconscious patient)              Responsible Party)    ___________________________               _____________________  (Relationship to Patient if not self)                                    (Date and Time)    __________________________                                                           ______________________              (Signature of Witness)               (Printed Name of Witness)     Language line ()    Telephone/Verbal/Video Consent    __________________________                     ____________________  (Signature of 2nd Witness           (Printed Name of 2nd  Telephone/Verbal/Video Consent)           Witness)    Patient Name: Jared Nicole     : 1945                 Printed: 2025     Medical Record #: [id number]      Rev: 2023

## (undated) NOTE — LETTER
[address]  [address]  Authorization for Surgical Operation and Procedure     Date:___________                                                                                                         Time:__________  I hereby authorize Surgeon(s):  Yoan Cunha MD, my physician and his/her assistants (if applicable), which may include medical students, residents, and/or fellows, to perform the following surgical operation/ procedure and administer such anesthesia as may be determined necessary by my physician:  Operation/Procedure name (s) Procedure(s):  ENDOSCOPIC ULTRASOUND (EUS),  POSSIBLE ENDOSCOPIC RETROGRADE CHOLANGIOPANCREATOGRAPHY (ERCP)  ENDOSCOPIC RETROGRADE CHOLANGIOPANCREATOGRAPHY (ERCP) on Jared ADILSON Nicole   2.   I recognize that during the surgical operation/procedure, unforeseen conditions may necessitate additional or different procedures than those listed above.  I, therefore, further authorize and request that the above-named surgeon, assistants, or designees perform such procedures as are, in their judgment, necessary and desirable.    3.   My surgeon/physician has discussed prior to my surgery the potential benefits, risks and side effects of this procedure; the likelihood of achieving goals; and potential problems that might occur during recuperation.  They also discussed reasonable alternatives to the procedure, including risks, benefits, and side effects related to the alternatives and risks related to not receiving this procedure.  I have had all my questions answered and I acknowledge that no guarantee has been made as to the result that may be obtained.    4.   Should the need arise during my operation/procedure, which includes change of level of care prior to discharge, I also consent to the administration of blood and/or blood products.  Further, I understand that despite careful testing and screening of blood or blood products by collecting  agencies, I may still be subject to ill effects as a result of receiving a blood transfusion and/or blood products.  The following are some, but not all, of the potential risks that can occur: fever and allergic reactions, hemolytic reactions, transmission of diseases such as Hepatitis, AIDS and Cytomegalovirus (CMV) and fluid overload.  In the event that I wish to have an autologous transfusion of my own blood, or a directed donor transfusion, I will discuss this with my physician.  Check only if Refusing Blood or Blood Products  I understand refusal of blood or blood products as deemed necessary by my physician may have serious consequences to my condition to include possible death. I hereby assume responsibility for my refusal and release the hospital, its personnel, and my physicians from any responsibility for the consequences of my refusal.          o  Refuse      5.   I authorize the use of any specimen, organs, tissues, body parts or foreign objects that may be removed from my body during the operation/procedure for diagnosis, research or teaching purposes and their subsequent disposal by hospital authorities.  I also authorize the release of specimen test results and/or written reports to my treating physician on the hospital medical staff or other referring or consulting physicians involved in my care, at the discretion of the Pathologist or my treating physician.    6.   I consent to the photographing or videotaping of the operations or procedures to be performed, including appropriate portions of my body for medical, scientific, or educational purposes, provided my identity is not revealed by the pictures or by descriptive texts accompanying them.  If the procedure has been photographed/videotaped, the surgeon will obtain the original picture, image, videotape or CD.  The hospital will not be responsible for storage, release or maintenance of the picture, image, tape or CD.    7.   I consent to the  presence of a  or observers in the operating room as deemed necessary by my physician or their designees.    8.   I recognize that in the event my procedure results in extended X-Ray/fluoroscopy time, I may develop a skin reaction.    9. If I have a Do Not Attempt Resuscitation (DNAR) order in place, that status will be suspended while in the operating room, procedural suite, and during the recovery period unless otherwise explicitly stated by me (or a person authorized to consent on my behalf). The surgeon or my attending physician will determine when the applicable recovery period ends for purposes of reinstating the DNAR order.  10. Patients having a sterilization procedure: I understand that if the procedure is successful the results will be permanent and it will therefore be impossible for me to inseminate, conceive, or bear children.  I also understand that the procedure is intended to result in sterility, although the result has not been guaranteed.   11. I acknowledge that my physician has explained sedation/analgesia administration to me including the risk and benefits I consent to the administration of sedation/analgesia as may be necessary or desirable in the judgment of my physician.    I CERTIFY THAT I HAVE READ AND FULLY UNDERSTAND THE ABOVE CONSENT TO OPERATION and/or OTHER PROCEDURE.    _________________________________________  __________________________________  Signature of Patient     Signature of Responsible Person         ___________________________________         Printed Name of Responsible Person           _________________________________                 Relationship to Patient  _________________________________________  ______________________________  Signature of Witness          Date  Time      Patient Name: Jared Nicole     : 1945                 Printed: 2025     Medical Record #: [id number]                     Page 2 of 3                                     [address]  [address]    Consent for Anesthesia    I, Jared Nicole agree to be cared for by an anesthesiologist, who is specially trained to monitor me and give me medicine to put me to sleep or keep me comfortable during my procedure    I understand that my anesthesiologist is not an employee or agent of [facility] or [employer]. He or she works for [employer].    As the patient asking for anesthesia services, I agree to:  Allow the anesthesiologist (anesthesia doctor) to give me medicine and do additional procedures as necessary. Some examples are: Starting or using an “IV” to give me medicine, fluids or blood during my procedure, and having a breathing tube placed to help me breathe when I’m asleep (intubation). In the event that my heart stops working properly, I understand that my anesthesiologist will make every effort to sustain my life, unless otherwise directed by [facility] Do Not Resuscitate documents.  Tell my anesthesia doctor before my procedure:  If I am pregnant.  The last time that I ate or drank.  All of the medicines I take (including prescriptions, herbal supplements, and pills I can buy without a prescription (including street drugs/illegal medications). Failure to inform my anesthesiologist about these medicines may increase my risk of anesthetic complications.  If I am allergic to anything or have had a reaction to anesthesia before.  I understand how the anesthesia medicine will help me (benefits).  I understand that with any type of anesthesia medicine there are risks:  The most common risks are: nausea, vomiting, sore throat, muscle soreness, damage to my eyes, mouth, or teeth (from breathing tube placement).  Rare risks include: remembering what happened during my procedure, allergic reactions to medications, injury to my airway, heart, lungs, vision, nerves, or muscles and in extremely rare  instances death.  My doctor has explained to me other choices available to me for my care (alternatives).  Pregnant Patients (“epidural”):  I understand that the risks of having an epidural (medicine given into my back to help control pain during labor), include itching, low blood pressure, difficulty urinating, headache or slowing of the baby’s heart. Very rare risks include infection, bleeding, seizure, irregular heart rhythms and nerve injury.  Regional Anesthesia (“spinal”, “epidural”, & “nerve blocks”):  I understand that rare but potential complications include headache, bleeding, infection, seizure, irregular heart rhythms, and nerve injury.    I can change my mind about having anesthesia services at any time before I get the medicine.    _____________________________________________________________________________  Patient (or Representative) Signature/Relationship to Patient  Date   Time    _____________________________________________________________________________   Name (if used)    Language/Organization   Time    _____________________________________________________________________________  Anesthesiologist Signature     Date   Time  I have discussed the procedure and information above with the patient (or patient’s representative) and answered their questions. The patient or their representative has agreed to have anesthesia services.    _____________________________________________________________________________  Witness        Date   Time  I have verified that the signature is that of the patient or patient’s representative, and that it was signed before the procedure  Patient Name: Jared Nicole     : 1945                 Printed: 2025     Medical Record #: [id number]                     Page 3 of 3

## (undated) NOTE — LETTER
[address]  [address]  Consent for Procedure/Sedation  Date: 4/11/25          Time: 1730    I hereby authorize Dr Vang, my physician and his/her assistants (if applicable), which may include medical students, residents, and/or fellows, to perform the following surgical operation/ procedure and administer such anesthesia as may be determined necessary by my physician: Cholecystostomy Tube Insertion on Jared Nicole  2.   I recognize that during the surgical operation/procedure, unforeseen conditions may necessitate additional or different procedures than those listed above.  I, therefore, further authorize and request that the above-named surgeon, assistants, or designees perform such procedures as are, in their judgment, necessary and desirable.    3.   My surgeon/physician has discussed prior to my surgery the potential benefits, risks and side effects of this procedure; the likelihood of achieving goals; and potential problems that might occur during recuperation.  They also discussed reasonable alternatives to the procedure, including risks, benefits, and side effects related to the alternatives and risks related to not receiving this procedure.  I have had all my questions answered and I acknowledge that no guarantee has been made as to the result that may be obtained.    4.   Should the need arise during my operation/procedure, which includes change of level of care prior to discharge, I also consent to the administration of blood and/or blood products.  Further, I understand that despite careful testing and screening of blood or blood products by collecting agencies, I may still be subject to ill effects as a result of receiving a blood transfusion and/or blood products.  The following are some, but not all, of the potential risks that can occur: fever and allergic reactions, hemolytic reactions, transmission of diseases such as Hepatitis, AIDS and Cytomegalovirus  (CMV) and fluid overload.  In the event that I wish to have an autologous transfusion of my own blood, or a directed donor transfusion, I will discuss this with my physician.   Check only if Refusing Blood or Blood Products  I understand refusal of blood or blood products as deemed necessary by my physician may have serious consequences to my condition to include possible death. I hereby assume responsibility for my refusal and release the hospital, its personnel, and my physicians from any responsibility for the consequences of my refusal.         o  Refuse         5.   I authorize the use of any specimen, organs, tissues, body parts or foreign objects that may be removed from my body during the operation/procedure for diagnosis, research or teaching purposes and their subsequent disposal by hospital authorities.  I also authorize the release of specimen test results and/or written reports to my treating physician on the hospital medical staff or other referring or consulting physicians involved in my care, at the discretion of the Pathologist or my treating physician.    6.   I consent to the photographing or videotaping of the operations or procedures to be performed, including appropriate portions of my body for medical, scientific, or educational purposes, provided my identity is not revealed by the pictures or by descriptive texts accompanying them.  If the procedure has been photographed/videotaped, the surgeon will obtain the original picture, image, videotape or CD.  The hospital will not be responsible for storage, release or maintenance of the picture, image, tape or CD.    7.   I consent to the presence of a  or observers in the operating room as deemed necessary by my physician or their designees.    8.   I recognize that in the event my procedure results in extended X-Ray/fluoroscopy time, I may develop a skin reaction.    9. If I have a Do Not Attempt Resuscitation (DNAR) order in  place, that status will be suspended while in the operating room, procedural suite, and during the recovery period unless otherwise explicitly stated by me (or a person authorized to consent on my behalf). The surgeon or my attending physician will determine when the applicable recovery period ends for purposes of reinstating the DNAR order.  10. Patients having a sterilization procedure: I understand that if the procedure is successful the results will be permanent and it will therefore be impossible for me to inseminate, conceive, or bear children.  I also understand that the procedure is intended to result in sterility, although the result has not been guaranteed.   11. I acknowledge that my physician has explained sedation/analgesia administration to me including the risk and benefits I consent to the administration of sedation/analgesia as may be necessary or desirable in the judgment of my physician.    I CERTIFY THAT I HAVE READ AND FULLY UNDERSTAND THE ABOVE CONSENT TO OPERATION and/or OTHER PROCEDURE.        ____________________________________       _________________________________      ______________________________  Signature of Patient         Signature of Responsible Person        Printed Name of Responsible Person        ____________________________________      _________________________________      ______________________________       Signature of Witness          Relationship to Patient                       Date                                       Time  Patient Name: Jared Nicole  : 1945    Reviewed: 2024   Printed: 2025  Medical Record #: [id number] Page 1 of 1

## (undated) NOTE — MR AVS SNAPSHOT
[facility] [address]  [address] [facility] [place]  [phone]               Thank you for choosing us for your health care visit with Марина Claros MD.  We are glad to serve you and happy to provide you with t Commonly known as:  OCUFLOX           prednisoLONE acetate 1 % Susp   Commonly known as:  PRED FORTE           Vitamin D 1000 UNITS Caps   Take 1 Cap by mouth daily.            * Warfarin Sodium 2.5 MG Tabs   Take 1 tablet by mouth on Thurs and Sat   Common DASH eating plan Adopt a diet rich in fruits, vegetables, and low fat dairy products with reduced content of saturated and total fat.    Dietary sodium reduction Reduce dietary sodium intake to <= 100 mmol per day (2.4 g sodium or 6 g sodium chloride)   Aer

## (undated) NOTE — LETTER
25    Patient: Jared Nicole  : 1945 Visit date: 2025    Dear  Eric Neil MD    Thank you for referring Jared Nicole to my practice.  Please find my assessment and plan below.     Assessment   1. Calculus of gallbladder with acute cholecystitis and obstruction          Plan     The patient is overall doing well following percutaneous drainage of the gallbladder and ERCP with stone extraction and papillotomy.    The patient is at elevated cardiac risk for surgical intervention therefore we will forego laparoscopic cholecystectomy now.    I have ordered cholangiogram through the existing percutaneous catheter to determine if the cystic duct is patent.    If the duct is patent tube can be removed and the patient will follow-up on an as-needed basis.    If the cystic duct remains occluded the tube will remain in place and the patient will follow-up with me for periodic surveillance    The patient was provided ample opportunity to ask questions.  All of the patient's questions were answered in detail.  The patient voiced understanding of the care plan.      Sincerely,       Janes Dias MD   CC: No Recipients